# Patient Record
Sex: MALE | Race: WHITE | Employment: FULL TIME
[De-identification: names, ages, dates, MRNs, and addresses within clinical notes are randomized per-mention and may not be internally consistent; named-entity substitution may affect disease eponyms.]

---

## 2017-01-10 ENCOUNTER — OFFICE VISIT (OUTPATIENT)
Facility: CLINIC | Age: 57
End: 2017-01-10

## 2017-01-10 VITALS
WEIGHT: 209 LBS | DIASTOLIC BLOOD PRESSURE: 70 MMHG | SYSTOLIC BLOOD PRESSURE: 124 MMHG | BODY MASS INDEX: 31.67 KG/M2 | HEIGHT: 68 IN | TEMPERATURE: 98.6 F

## 2017-01-10 DIAGNOSIS — M54.41 CHRONIC BILATERAL LOW BACK PAIN WITH BILATERAL SCIATICA: Primary | ICD-10-CM

## 2017-01-10 DIAGNOSIS — R73.9 ELEVATED BLOOD SUGAR: ICD-10-CM

## 2017-01-10 DIAGNOSIS — G89.29 CHRONIC BILATERAL LOW BACK PAIN WITH BILATERAL SCIATICA: Primary | ICD-10-CM

## 2017-01-10 DIAGNOSIS — Z00.00 ROUTINE HEALTH MAINTENANCE: ICD-10-CM

## 2017-01-10 DIAGNOSIS — L98.9 SKIN LESION: ICD-10-CM

## 2017-01-10 DIAGNOSIS — Z12.5 PROSTATE CANCER SCREENING: ICD-10-CM

## 2017-01-10 DIAGNOSIS — M54.42 CHRONIC BILATERAL LOW BACK PAIN WITH BILATERAL SCIATICA: Primary | ICD-10-CM

## 2017-01-10 PROCEDURE — 99214 OFFICE O/P EST MOD 30 MIN: CPT | Performed by: NURSE PRACTITIONER

## 2017-01-10 RX ORDER — HYDROCODONE BITARTRATE AND ACETAMINOPHEN 5; 325 MG/1; MG/1
1 TABLET ORAL EVERY 4 HOURS PRN
Qty: 30 TABLET | Refills: 0 | Status: SHIPPED | OUTPATIENT
Start: 2017-01-10 | End: 2017-03-13 | Stop reason: SDUPTHER

## 2017-01-10 RX ORDER — PREGABALIN 150 MG/1
150 CAPSULE ORAL 2 TIMES DAILY
Qty: 60 CAPSULE | Refills: 3 | Status: SHIPPED | OUTPATIENT
Start: 2017-01-10 | End: 2017-08-14 | Stop reason: SDUPTHER

## 2017-01-10 ASSESSMENT — ENCOUNTER SYMPTOMS: BACK PAIN: 1

## 2017-02-03 ENCOUNTER — OFFICE VISIT (OUTPATIENT)
Facility: CLINIC | Age: 57
End: 2017-02-03

## 2017-02-03 VITALS
SYSTOLIC BLOOD PRESSURE: 128 MMHG | WEIGHT: 210 LBS | BODY MASS INDEX: 31.83 KG/M2 | DIASTOLIC BLOOD PRESSURE: 78 MMHG | TEMPERATURE: 98.1 F | HEIGHT: 68 IN

## 2017-02-03 DIAGNOSIS — R20.9 DISTURBANCE OF SKIN SENSATION: ICD-10-CM

## 2017-02-03 DIAGNOSIS — D22.9 CHANGE IN MOLE: Primary | ICD-10-CM

## 2017-02-03 PROCEDURE — 11421 EXC H-F-NK-SP B9+MARG 0.6-1: CPT | Performed by: NURSE PRACTITIONER

## 2017-02-03 PROCEDURE — 11420 EXC H-F-NK-SP B9+MARG 0.5/<: CPT | Performed by: NURSE PRACTITIONER

## 2017-02-03 RX ORDER — CEPHALEXIN 500 MG/1
500 CAPSULE ORAL 4 TIMES DAILY
Qty: 40 CAPSULE | Refills: 0 | Status: SHIPPED | OUTPATIENT
Start: 2017-02-03 | End: 2017-02-13

## 2017-02-03 RX ORDER — LIDOCAINE HYDROCHLORIDE AND EPINEPHRINE 10; 10 MG/ML; UG/ML
20 INJECTION, SOLUTION INFILTRATION; PERINEURAL ONCE
Status: COMPLETED | OUTPATIENT
Start: 2017-02-03 | End: 2017-02-03

## 2017-02-03 RX ADMIN — LIDOCAINE HYDROCHLORIDE AND EPINEPHRINE 20 ML: 10; 10 INJECTION, SOLUTION INFILTRATION; PERINEURAL at 15:10

## 2017-02-20 RX ORDER — ONDANSETRON 4 MG/1
4 TABLET, FILM COATED ORAL EVERY 8 HOURS PRN
Qty: 60 TABLET | Refills: 1 | Status: SHIPPED | OUTPATIENT
Start: 2017-02-20 | End: 2018-11-15 | Stop reason: SDUPTHER

## 2018-08-10 ENCOUNTER — APPOINTMENT (OUTPATIENT)
Dept: GENERAL RADIOLOGY | Age: 58
End: 2018-08-10
Payer: COMMERCIAL

## 2018-08-10 ENCOUNTER — HOSPITAL ENCOUNTER (EMERGENCY)
Age: 58
Discharge: HOME | End: 2018-08-10
Attending: EMERGENCY MEDICINE
Payer: COMMERCIAL

## 2018-08-10 VITALS
RESPIRATION RATE: 16 BRPM | OXYGEN SATURATION: 97 % | TEMPERATURE: 98.2 F | BODY MASS INDEX: 30.31 KG/M2 | DIASTOLIC BLOOD PRESSURE: 87 MMHG | HEIGHT: 68 IN | HEART RATE: 78 BPM | SYSTOLIC BLOOD PRESSURE: 145 MMHG | WEIGHT: 200 LBS

## 2018-08-10 DIAGNOSIS — N20.0 KIDNEY STONE: Primary | ICD-10-CM

## 2018-08-10 LAB
-: NORMAL
AMORPHOUS: NORMAL
BACTERIA: NORMAL
BILIRUBIN URINE: NEGATIVE
CASTS UA: NORMAL /LPF (ref 0–8)
COLOR: YELLOW
COMMENT UA: ABNORMAL
CRYSTALS, UA: NORMAL /HPF
EPITHELIAL CELLS UA: NORMAL /HPF (ref 0–5)
GLUCOSE URINE: ABNORMAL
KETONES, URINE: NEGATIVE
LEUKOCYTE ESTERASE, URINE: NEGATIVE
MUCUS: NORMAL
NITRITE, URINE: NEGATIVE
OTHER OBSERVATIONS UA: NORMAL
PH UA: 5 (ref 5–8)
PROTEIN UA: NEGATIVE
RBC UA: NORMAL /HPF (ref 0–4)
RENAL EPITHELIAL, UA: NORMAL /HPF
SPECIFIC GRAVITY UA: 1.02 (ref 1–1.03)
TRICHOMONAS: NORMAL
TURBIDITY: CLEAR
URINE HGB: ABNORMAL
UROBILINOGEN, URINE: NORMAL
WBC UA: NORMAL /HPF (ref 0–5)
YEAST: NORMAL

## 2018-08-10 PROCEDURE — 99284 EMERGENCY DEPT VISIT MOD MDM: CPT

## 2018-08-10 PROCEDURE — 6360000002 HC RX W HCPCS: Performed by: STUDENT IN AN ORGANIZED HEALTH CARE EDUCATION/TRAINING PROGRAM

## 2018-08-10 PROCEDURE — 96372 THER/PROPH/DIAG INJ SC/IM: CPT

## 2018-08-10 PROCEDURE — 81001 URINALYSIS AUTO W/SCOPE: CPT

## 2018-08-10 PROCEDURE — 74018 RADEX ABDOMEN 1 VIEW: CPT

## 2018-08-10 RX ORDER — TAMSULOSIN HYDROCHLORIDE 0.4 MG/1
0.4 CAPSULE ORAL DAILY
Qty: 5 CAPSULE | Refills: 0 | Status: SHIPPED | OUTPATIENT
Start: 2018-08-10 | End: 2019-01-14 | Stop reason: ALTCHOICE

## 2018-08-10 RX ORDER — KETOROLAC TROMETHAMINE 30 MG/ML
30 INJECTION, SOLUTION INTRAMUSCULAR; INTRAVENOUS ONCE
Status: COMPLETED | OUTPATIENT
Start: 2018-08-10 | End: 2018-08-10

## 2018-08-10 RX ORDER — KETOROLAC TROMETHAMINE 10 MG/1
10 TABLET, FILM COATED ORAL EVERY 6 HOURS PRN
Qty: 20 TABLET | Refills: 0 | Status: SHIPPED | OUTPATIENT
Start: 2018-08-10 | End: 2018-08-20 | Stop reason: ALTCHOICE

## 2018-08-10 RX ADMIN — KETOROLAC TROMETHAMINE 30 MG: 30 INJECTION, SOLUTION INTRAMUSCULAR at 08:26

## 2018-08-10 ASSESSMENT — ENCOUNTER SYMPTOMS
CHEST TIGHTNESS: 0
COUGH: 0
SHORTNESS OF BREATH: 0
WHEEZING: 0
SORE THROAT: 0
VOMITING: 0
BACK PAIN: 0
TROUBLE SWALLOWING: 0
ABDOMINAL PAIN: 0
NAUSEA: 0
PHOTOPHOBIA: 0

## 2018-08-10 ASSESSMENT — PAIN SCALES - GENERAL: PAINLEVEL_OUTOF10: 0

## 2018-08-10 NOTE — ED PROVIDER NOTES
[facility] ED  Emergency Department Encounter  Emergency Medicine Resident     Pt Name: Maxwell Spence  MRN: [id number]  [place] 1960  Date of evaluation: 8/10/18  PCP:  Willa Gleason MD    [address] [place] [address]       Chief Complaint   Patient presents with    Abdominal Pain       HISTORY OF PRESENT ILLNESS  (Location/Symptom, Timing/Onset, Context/Setting, Quality, Duration, Modifying Factors, Severity.)      Maxwell Spence is a 66 y.o. male who presents with Right lower quadrant abdominal pain that radiates down his right groin and right testicle. Patient without any fevers, nausea, vomiting. Patient states that his pain started briefly this morning while at work. Patient denies any heavy lifting or injury to the area. Patient denies any prior history of hernias. He denies any bulging to the area. Pain lasted approximately 5-10 minutes. Patient with history of kidney stone and states that this feels like a kidney stone. He denies any hematuria, dysuria. PAST MEDICAL / SURGICAL / SOCIAL / FAMILY HISTORY      has a past medical history of Chronic back pain; Headache(784.0); Kidney stones; Osteoarthritis; and Type II or unspecified type diabetes mellitus without mention of complication, not stated as uncontrolled. has a past surgical history that includes Colonoscopy (2012); Spine surgery; and Ureter stent placement. Social History     Social History    Marital status:      Spouse name: N/A    Number of children: N/A    Years of education: N/A     Occupational History    Not on file.      Social History Main Topics    Smoking status: Never Smoker    Smokeless tobacco: Never Used    Alcohol use Yes      Comment: rarly    Drug use: No    Sexual activity: Not on file     Other Topics Concern    Not on file     Social History Narrative    No narrative on file       Family History   Problem Relation Age of Onset    Diabetes Mother     High Blood Pressure Mother    [employer] confusion. PHYSICAL EXAM   (up to 7 for level 4, 8 or more for level 5)      INITIAL VITALS:   BP (!) 145/87   Pulse 78   Temp 98.2 °F (36.8 °C) (Oral)   Resp 16   Ht 5' 8\" (1.727 m)   Wt 200 lb (90.7 kg)   SpO2 97%   BMI 30.41 kg/m²     Physical Exam   Constitutional: He is oriented to person, place, and time. He appears well-developed and well-nourished. HENT:   Head: Normocephalic and atraumatic. Nose: Nose normal.   Mouth/Throat: Oropharynx is clear and moist.   Eyes: EOM are normal. Pupils are equal, round, and reactive to light. Neck: Normal range of motion. Neck supple. Cardiovascular: Normal rate, regular rhythm, normal heart sounds and intact distal pulses. Pulmonary/Chest: Breath sounds normal. No respiratory distress. He has no wheezes. He has no rales. Abdominal: Soft. Bowel sounds are normal. He exhibits no distension. There is no tenderness. Abdomen soft, no guarding, no rebound, no tenderness right lower quadrant. Genitourinary:   Genitourinary Comments: No testicular swelling, tenderness. No overlying scrotal edema. Musculoskeletal: Normal range of motion. He exhibits no edema or deformity. Neurological: He is alert and oriented to person, place, and time. He has normal reflexes. Skin: Skin is warm and dry. No rash noted. No erythema. Psychiatric: He has a normal mood and affect.  His behavior is normal.       DIFFERENTIAL  DIAGNOSIS     PLAN (LABS / IMAGING / EKG):  Orders Placed This Encounter   Procedures    XR ABDOMEN (KUB) (SINGLE AP VIEW)    Urinalysis Reflex to Culture    Microscopic Urinalysis       MEDICATIONS ORDERED:  Orders Placed This Encounter   Medications    ketorolac (TORADOL) injection 30 mg    ketorolac (TORADOL) 10 MG tablet     Sig: Take 1 tablet by mouth every 6 hours as needed for Pain     Dispense:  20 tablet     Refill:  0    tamsulosin (FLOMAX) 0.4 MG capsule     Sig: Take 1 capsule by mouth daily for 5 doses     Dispense:  5 or persist or if he experiences decreased urination or hematuria. And so UTI on urinalysis. Large blood is present.     PROCEDURES:  None    CONSULTS:  None    CRITICAL CARE:  None    FINAL IMPRESSION      1. Kidney stone          DISPOSITION / PLAN     DISPOSITION Decision To Discharge 08/10/2018 09:24:11 AM      PATIENT REFERRED TO:  Sariah Mason MD  [address] [facility]  [address] [facility]  [address] [facility]  [phone]    Schedule an appointment as soon as possible for a visit in 1 week  As needed    Melissa Santoyo MD  [place] [address], [address]  [address]    Schedule an appointment as soon as possible for a visit in 1 week  As needed      DISCHARGE MEDICATIONS:  Discharge Medication List as of 8/10/2018  9:26 AM      START taking these medications    Details   ketorolac (TORADOL) 10 MG tablet Take 1 tablet by mouth every 6 hours as needed for Pain, Disp-20 tablet, R-0Print      tamsulosin (FLOMAX) 0.4 MG capsule Take 1 capsule by mouth daily for 5 doses, Disp-5 capsule, R-0Print             Mark Rosales MD  Emergency Medicine Resident    (Please note that portions of this note were completed with a voice recognition program.  Efforts were made to edit the dictations but occasionally words are mis-transcribed.)       Mark Rosales MD  08/10/18 [id number]

## 2018-08-20 ENCOUNTER — OFFICE VISIT (OUTPATIENT)
Dept: UROLOGY | Age: 58
End: 2018-08-20
Payer: COMMERCIAL

## 2018-08-20 VITALS
HEIGHT: 68 IN | DIASTOLIC BLOOD PRESSURE: 81 MMHG | BODY MASS INDEX: 30.68 KG/M2 | SYSTOLIC BLOOD PRESSURE: 123 MMHG | WEIGHT: 202.4 LBS | TEMPERATURE: 98.6 F | HEART RATE: 58 BPM

## 2018-08-20 DIAGNOSIS — R10.9 FLANK PAIN: ICD-10-CM

## 2018-08-20 DIAGNOSIS — N20.0 KIDNEY STONE: Primary | ICD-10-CM

## 2018-08-20 DIAGNOSIS — N20.1 URETERAL STONE: ICD-10-CM

## 2018-08-20 PROCEDURE — 99204 OFFICE O/P NEW MOD 45 MIN: CPT | Performed by: UROLOGY

## 2018-08-20 ASSESSMENT — ENCOUNTER SYMPTOMS
ABDOMINAL PAIN: 0
SHORTNESS OF BREATH: 0
COUGH: 0
NAUSEA: 0
WHEEZING: 0
EYE REDNESS: 0
VOMITING: 0
COLOR CHANGE: 0
BACK PAIN: 0
EYE PAIN: 0

## 2018-08-20 NOTE — PROGRESS NOTES
[facility]  [facility] [place]  [address]  [address]  [address] [facility] [address]  Dept: [phone]  Dept Fax: [phone] [facility] Urology Office Note - New patient    Patient:  Wally Rizzo  YOB: 1960  Date: 8/20/2018    The patient is a 66 y.o. male who presents today for evaluation of the following problems:   Chief Complaint   Patient presents with    New Patient     Kidney stones . seen at [facility]     referred by Lilly Saravia MD.      HPI  Pt has h/o stones. Had flank pain on right last week and went to Ed. Bedside us showed right hydro and pt found to have stones on KUB. Pain has resolved. No LUTS. (Patient's old records have been requested, reviewed and summarized in today's note.)    Summary of old records: N/A    Additional History: N/A    Procedures Today: N/A    Last several PSA's:  Lab Results   Component Value Date    PSA 0.42 05/02/2015     Last total testosterone:  No results found for: TESTOSTERONE  Urinalysis today:  No results found for this visit on 08/20/18. AUA Symptom Score (8/20/2018):   INCOMPLETE EMPTYING: How often have you had the sensation of not emptying your bladder?: Not at all  FREQUENCY: How often do you have to urinate less than every two hours?: Not at all  INTERMITTENCY: How often have you found you stopped and started again several times when you urinated?: Not at all  URGENCY: How often have you found it difficult to postpone urination?: Not at all  WEAK STREAM: How often have you had a weak urinary stream?: Not at all  STRAINING: How often have you had to strain to start  urination?: Not at all  NOCTURIA: How many times did you typically get up at night to uriniate?: NONE  TOTAL I-PSS SCORE[de-identified] 0  How would you feel if you were to spend the rest of your life with your urinary condition?: Pleased    Last BUN and creatinine:  Lab Results   Component Value Date    BUN 17 05/02/2015     Lab Results   Component Value Date    CREATININE 0.96 05/02/2015       Additional Lab/Culture results: none    Imaging Reviewed during this Office Visit: KUB bilateral stones  (results were independently reviewed by physician and radiology report verified)    PAST MEDICAL, FAMILY AND SOCIAL HISTORY:  Past Medical History:   Diagnosis Date    Chronic back pain     Headache(784.0)     Kidney stones     Osteoarthritis     Type II or unspecified type diabetes mellitus without mention of complication, not stated as uncontrolled     diet controlled     Past Surgical History:   Procedure Laterality Date    COLONOSCOPY  2012    SPINE SURGERY      laminectomy X2 and Spinal Cord stimulartor implant    URETER STENT PLACEMENT       Family History   Problem Relation Age of Onset    Diabetes Mother     High Blood Pressure Mother     Diabetes Father     Glaucoma Father     Eczema Father      Outpatient Prescriptions Marked as Taking for the 8/20/18 encounter (Office Visit) with Brock Boggs MD   Medication Sig Dispense Refill    HYDROcodone-acetaminophen (NORCO) 5-325 MG per tablet Take 1 tablet by mouth every 4 hours as needed for Pain for up to 30 days. . 30 tablet 0    LYRICA 150 MG capsule TAKE ONE CAPSULE BY MOUTH TWICE A DAY 60 capsule 2    ibuprofen (ADVIL;MOTRIN) 800 MG tablet Take 1 tablet by mouth every 6 hours as needed for Pain 240 tablet 1    ondansetron (ZOFRAN) 4 MG tablet Take 1 tablet by mouth every 8 hours as needed for Nausea 60 tablet 1       Morphine and Fentanyl  History   Smoking Status    Never Smoker   Smokeless Tobacco    Never Used      (If patient a smoker, smoking cessation counseling offered)   History   Alcohol Use    Yes     Comment: rarly       REVIEW OF SYSTEMS:  Review of Systems    Physical Exam:    This a 66 y.o. male   Vitals:    08/20/18 0919   BP: 123/81   Pulse: 58   Temp: 98.6 °F (37 °C)     Body mass index is 30.77 kg/m². Physical Exam  Constitutional: Patient in no acute distress.   Neuro: Alert and oriented to person, place and time. Psych: Mood normal, affect normal  Skin: No rash noted  HEENT: Head: Normocephalic and atraumatic  Conjunctivae and EOM are normal. Pupils are equal, round  Nose: Normal  Right External Ear: Normal; Left External Ear: Normal  Mouth: Mucosa Moist  Neck: Supple  Lungs: Respiratory effort is normal  Cardiovascular: Warm & Pink  Abdomen: Soft, non-tender, non-distended with no CVA,  No flank tenderness,  Or hepatosplenomegaly   Lymphatics: No palpable lymphadenopathy. Bladder non-tender and not distended. Musculoskeletal: Normal gait and station  Penis normal and circumcised  Urethral meatus normal  Scrotal exam normal  Testicles normal bilaterally  Epididymis normal bilaterally        Assessment and Plan      1. Kidney stone    2. Ureteral stone    3. Flank pain           Plan:   Ct a/p stone protocol  F/u after above       Prescriptions Ordered:  No orders of the defined types were placed in this encounter.      Orders Placed:  Orders Placed This Encounter   Procedures    CT ABDOMEN PELVIS WO CONTRAST Additional Contrast? None     Standing Status:   Future     Standing Expiration Date:   8/20/2019     Order Specific Question:   Additional Contrast?     Answer:   None     Order Specific Question:   Reason for exam:     Answer:   kidney stones            Chelsea Burger MD

## 2018-08-25 ENCOUNTER — HOSPITAL ENCOUNTER (OUTPATIENT)
Dept: CT IMAGING | Age: 58
Discharge: HOME | End: 2018-08-27
Payer: COMMERCIAL

## 2018-08-25 DIAGNOSIS — N20.0 KIDNEY STONE: ICD-10-CM

## 2018-08-25 DIAGNOSIS — R10.9 FLANK PAIN: ICD-10-CM

## 2018-08-25 DIAGNOSIS — N20.1 URETERAL STONE: ICD-10-CM

## 2018-08-25 PROCEDURE — 74176 CT ABD & PELVIS W/O CONTRAST: CPT

## 2018-09-10 ENCOUNTER — TELEPHONE (OUTPATIENT)
Dept: UROLOGY | Age: 58
End: 2018-09-10

## 2018-09-10 ENCOUNTER — OFFICE VISIT (OUTPATIENT)
Dept: UROLOGY | Age: 58
End: 2018-09-10
Payer: COMMERCIAL

## 2018-09-10 VITALS
HEIGHT: 68 IN | WEIGHT: 203.8 LBS | HEART RATE: 53 BPM | TEMPERATURE: 98.3 F | BODY MASS INDEX: 30.89 KG/M2 | SYSTOLIC BLOOD PRESSURE: 127 MMHG | DIASTOLIC BLOOD PRESSURE: 81 MMHG

## 2018-09-10 DIAGNOSIS — N20.1 URETERAL STONE: Primary | ICD-10-CM

## 2018-09-10 DIAGNOSIS — N20.0 KIDNEY STONE: ICD-10-CM

## 2018-09-10 DIAGNOSIS — Z01.818 PRE-OP TESTING: Primary | ICD-10-CM

## 2018-09-10 DIAGNOSIS — R10.9 FLANK PAIN: ICD-10-CM

## 2018-09-10 PROCEDURE — 99999 EKG 12-LEAD: CPT | Performed by: UROLOGY

## 2018-09-10 PROCEDURE — 99214 OFFICE O/P EST MOD 30 MIN: CPT | Performed by: UROLOGY

## 2018-09-10 ASSESSMENT — ENCOUNTER SYMPTOMS
WHEEZING: 0
COLOR CHANGE: 0
EYE PAIN: 0
SHORTNESS OF BREATH: 0
EYE REDNESS: 0
BACK PAIN: 0
VOMITING: 0
NAUSEA: 0
COUGH: 0
ABDOMINAL PAIN: 0

## 2018-09-10 NOTE — TELEPHONE ENCOUNTER
(LT) FELIX @ [facility] 10/17/18 pm **STOP BLOOD THINNERS 10/10/18**  Please put in order for KUB and EKG           Spoke with in office, procedure info give to patient.

## 2018-09-10 NOTE — PROGRESS NOTES
[facility]  [facility] [place]  [address]  [address]  [address] [facility] [address]  Dept: [address] [facility] Urology Office Note - Established    Patient:  Wally Rizzo  YOB: 1960  Date: 9/10/2018    The patient is a 66 y.o. male who presents today for evaluation of the following problems:   Chief Complaint   Patient presents with    Nephrolithiasis     CT Results        HPI  Kidney calculus:  Patient is here today for a kidney calculus which was first noted a few week(s) ago. Location: Right upper and lower pole  Size: multiple mm  Current medical Rx for renal calculus: none  Passed recent calculus? Yes  Stone composition: unknown  Flank pain? no  Hematuria? none    Lab Results   Component Value Date    CALCIUM 8.7 05/02/2015     Lab Results   Component Value Date     05/02/2015    K 4.7 05/02/2015     05/02/2015    CO2 25 05/02/2015         Summary of old records: N/A    Additional History: N/A    Procedures Today: N/A    Urinalysis today:  No results found for this visit on 09/10/18. Last several PSA's:  Lab Results   Component Value Date    PSA 0.42 05/02/2015     Last total testosterone:  No results found for: TESTOSTERONE    AUA Symptom Score (9/10/2018):   INCOMPLETE EMPTYING: How often have you had the sensation of not emptying your bladder?: Not at all  FREQUENCY: How often do you have to urinate less than every two hours?: Not at all  INTERMITTENCY: How often have you found you stopped and started again several times when you urinated?: Not at all  URGENCY: How often have you found it difficult to postpone urination?: Not at all  WEAK STREAM: How often have you had a weak urinary stream?: Not at all  STRAINING: How often have you had to strain to start  urination?: Not at all  NOCTURIA: How many times did you typically get up at night to uriniate?: NONE  TOTAL I-PSS SCORE[de-identified] 0  How would you feel if you were to spend the rest of your life with your distress. Neuro: Alert and oriented to person, place and time. Psych: Mood normal, affect normal  Skin: No rash noted  HEENT: Head: Normocephalic and atraumatic  Conjunctivae and EOM are normal. Pupils are equal, round  Nose: Normal  Right External Ear: Normal; Left External Ear: Normal  Mouth: Mucosa Moist  Neck: Supple  Lungs: Respiratory effort is normal  Cardiovascular: Warm & Pink  Abdomen: Soft, non-tender, non-distended with no CVA,  No flank tenderness,  Or hepatosplenomegaly   Lymphatics: No palpable lymphadenopathy. Bladder non-tender and not distended. Musculoskeletal: Normal gait and station      Assessment and Plan      1. Ureteral stone    2. Kidney stone    3. Flank pain           Plan:   Left eswl       Prescriptions Ordered:  No orders of the defined types were placed in this encounter. Orders Placed:  No orders of the defined types were placed in this encounter. Jeannie Marquez MD    Agree with the ROS entered by the MA.

## 2018-10-10 ENCOUNTER — HOSPITAL ENCOUNTER (OUTPATIENT)
Dept: GENERAL RADIOLOGY | Age: 58
Discharge: HOME | End: 2018-10-12
Payer: COMMERCIAL

## 2018-10-10 ENCOUNTER — HOSPITAL ENCOUNTER (OUTPATIENT)
Age: 58
Discharge: HOME | End: 2018-10-12
Payer: COMMERCIAL

## 2018-10-10 ENCOUNTER — HOSPITAL ENCOUNTER (OUTPATIENT)
Age: 58
Discharge: HOME | End: 2018-10-10
Payer: COMMERCIAL

## 2018-10-10 DIAGNOSIS — Z01.818 PRE-OP TESTING: ICD-10-CM

## 2018-10-10 LAB
EKG ATRIAL RATE: 52 BPM
EKG P AXIS: 69 DEGREES
EKG P-R INTERVAL: 158 MS
EKG Q-T INTERVAL: 436 MS
EKG QRS DURATION: 98 MS
EKG QTC CALCULATION (BAZETT): 405 MS
EKG R AXIS: 33 DEGREES
EKG T AXIS: 37 DEGREES
EKG VENTRICULAR RATE: 52 BPM

## 2018-10-10 PROCEDURE — 74018 RADEX ABDOMEN 1 VIEW: CPT

## 2018-10-10 PROCEDURE — 93005 ELECTROCARDIOGRAM TRACING: CPT

## 2018-11-06 ENCOUNTER — TELEPHONE (OUTPATIENT)
Dept: UROLOGY | Age: 58
End: 2018-11-06

## 2018-11-06 DIAGNOSIS — N20.0 KIDNEY STONE: Primary | ICD-10-CM

## 2019-01-12 ENCOUNTER — NURSE TRIAGE (OUTPATIENT)
Dept: OTHER | Facility: CLINIC | Age: 59
End: 2019-01-12

## 2019-01-12 ENCOUNTER — HOSPITAL ENCOUNTER (EMERGENCY)
Age: 59
Discharge: HOME | End: 2019-01-12
Attending: EMERGENCY MEDICINE
Payer: COMMERCIAL

## 2019-01-12 ENCOUNTER — APPOINTMENT (OUTPATIENT)
Dept: CT IMAGING | Age: 59
End: 2019-01-12
Payer: COMMERCIAL

## 2019-01-12 VITALS
HEART RATE: 58 BPM | DIASTOLIC BLOOD PRESSURE: 83 MMHG | BODY MASS INDEX: 30.31 KG/M2 | OXYGEN SATURATION: 98 % | SYSTOLIC BLOOD PRESSURE: 159 MMHG | HEIGHT: 68 IN | WEIGHT: 200 LBS | TEMPERATURE: 98 F | RESPIRATION RATE: 16 BRPM

## 2019-01-12 DIAGNOSIS — N20.1 URETEROLITHIASIS: Primary | ICD-10-CM

## 2019-01-12 LAB
-: ABNORMAL
ABSOLUTE EOS #: 0.1 K/UL (ref 0–0.4)
ABSOLUTE IMMATURE GRANULOCYTE: ABNORMAL K/UL (ref 0–0.3)
ABSOLUTE LYMPH #: 1.5 K/UL (ref 1–4.8)
ABSOLUTE MONO #: 0.8 K/UL (ref 0.1–1.3)
ALBUMIN SERPL-MCNC: 4.1 G/DL (ref 3.5–5.2)
ALBUMIN/GLOBULIN RATIO: ABNORMAL (ref 1–2.5)
ALP BLD-CCNC: 64 U/L (ref 40–129)
ALT SERPL-CCNC: 20 U/L (ref 5–41)
AMORPHOUS: ABNORMAL
ANION GAP SERPL CALCULATED.3IONS-SCNC: 14 MMOL/L (ref 9–17)
AST SERPL-CCNC: 17 U/L
BACTERIA: ABNORMAL
BASOPHILS # BLD: 0 % (ref 0–2)
BASOPHILS ABSOLUTE: 0 K/UL (ref 0–0.2)
BILIRUB SERPL-MCNC: 0.61 MG/DL (ref 0.3–1.2)
BILIRUBIN URINE: NEGATIVE
BUN BLDV-MCNC: 13 MG/DL (ref 6–20)
BUN/CREAT BLD: ABNORMAL (ref 9–20)
CALCIUM SERPL-MCNC: 9.4 MG/DL (ref 8.6–10.4)
CASTS UA: ABNORMAL /LPF
CHLORIDE BLD-SCNC: 101 MMOL/L (ref 98–107)
CO2: 22 MMOL/L (ref 20–31)
COLOR: YELLOW
COMMENT UA: ABNORMAL
CREAT SERPL-MCNC: 0.87 MG/DL (ref 0.7–1.2)
CRYSTALS, UA: ABNORMAL /HPF
DIFFERENTIAL TYPE: ABNORMAL
EOSINOPHILS RELATIVE PERCENT: 1 % (ref 0–4)
EPITHELIAL CELLS UA: ABNORMAL /HPF
GFR AFRICAN AMERICAN: >60 ML/MIN
GFR NON-AFRICAN AMERICAN: >60 ML/MIN
GFR SERPL CREATININE-BSD FRML MDRD: ABNORMAL ML/MIN/{1.73_M2}
GFR SERPL CREATININE-BSD FRML MDRD: ABNORMAL ML/MIN/{1.73_M2}
GLUCOSE BLD-MCNC: 169 MG/DL (ref 70–99)
GLUCOSE URINE: NEGATIVE
HCT VFR BLD CALC: 48.6 % (ref 41–53)
HEMOGLOBIN: 16.3 G/DL (ref 13.5–17.5)
IMMATURE GRANULOCYTES: ABNORMAL %
KETONES, URINE: ABNORMAL
LEUKOCYTE ESTERASE, URINE: NEGATIVE
LIPASE: 45 U/L (ref 13–60)
LYMPHOCYTES # BLD: 17 % (ref 24–44)
MCH RBC QN AUTO: 31.3 PG (ref 26–34)
MCHC RBC AUTO-ENTMCNC: 33.5 G/DL (ref 31–37)
MCV RBC AUTO: 93.4 FL (ref 80–100)
MONOCYTES # BLD: 9 % (ref 1–7)
MUCUS: ABNORMAL
NITRITE, URINE: NEGATIVE
NRBC AUTOMATED: ABNORMAL PER 100 WBC
OTHER OBSERVATIONS UA: ABNORMAL
PDW BLD-RTO: 12.7 % (ref 11.5–14.9)
PH UA: 5.5 (ref 5–8)
PLATELET # BLD: 240 K/UL (ref 150–450)
PLATELET ESTIMATE: ABNORMAL
PMV BLD AUTO: 7.9 FL (ref 6–12)
POTASSIUM SERPL-SCNC: 4.2 MMOL/L (ref 3.7–5.3)
PROTEIN UA: NEGATIVE
RBC # BLD: 5.2 M/UL (ref 4.5–5.9)
RBC # BLD: ABNORMAL 10*6/UL
RBC UA: ABNORMAL /HPF
RENAL EPITHELIAL, UA: ABNORMAL /HPF
SEG NEUTROPHILS: 73 % (ref 36–66)
SEGMENTED NEUTROPHILS ABSOLUTE COUNT: 6.3 K/UL (ref 1.3–9.1)
SODIUM BLD-SCNC: 137 MMOL/L (ref 135–144)
SPECIFIC GRAVITY UA: 1.01 (ref 1–1.03)
TOTAL PROTEIN: 7.7 G/DL (ref 6.4–8.3)
TRICHOMONAS: ABNORMAL
TURBIDITY: CLEAR
URINE HGB: ABNORMAL
UROBILINOGEN, URINE: NORMAL
WBC # BLD: 8.7 K/UL (ref 3.5–11)
WBC # BLD: ABNORMAL 10*3/UL
WBC UA: ABNORMAL /HPF
YEAST: ABNORMAL

## 2019-01-12 PROCEDURE — 6360000002 HC RX W HCPCS: Performed by: EMERGENCY MEDICINE

## 2019-01-12 PROCEDURE — 80053 COMPREHEN METABOLIC PANEL: CPT

## 2019-01-12 PROCEDURE — 96374 THER/PROPH/DIAG INJ IV PUSH: CPT

## 2019-01-12 PROCEDURE — 36415 COLL VENOUS BLD VENIPUNCTURE: CPT

## 2019-01-12 PROCEDURE — 85025 COMPLETE CBC W/AUTO DIFF WBC: CPT

## 2019-01-12 PROCEDURE — 99284 EMERGENCY DEPT VISIT MOD MDM: CPT

## 2019-01-12 PROCEDURE — 81001 URINALYSIS AUTO W/SCOPE: CPT

## 2019-01-12 PROCEDURE — 96375 TX/PRO/DX INJ NEW DRUG ADDON: CPT

## 2019-01-12 PROCEDURE — 83690 ASSAY OF LIPASE: CPT

## 2019-01-12 PROCEDURE — 2580000003 HC RX 258: Performed by: EMERGENCY MEDICINE

## 2019-01-12 PROCEDURE — 74176 CT ABD & PELVIS W/O CONTRAST: CPT

## 2019-01-12 RX ORDER — ONDANSETRON 2 MG/ML
4 INJECTION INTRAMUSCULAR; INTRAVENOUS ONCE
Status: COMPLETED | OUTPATIENT
Start: 2019-01-12 | End: 2019-01-12

## 2019-01-12 RX ORDER — HYDROCODONE BITARTRATE AND ACETAMINOPHEN 5; 325 MG/1; MG/1
1 TABLET ORAL EVERY 6 HOURS PRN
Qty: 10 TABLET | Refills: 0 | Status: SHIPPED | OUTPATIENT
Start: 2019-01-12 | End: 2019-01-14

## 2019-01-12 RX ORDER — KETOROLAC TROMETHAMINE 30 MG/ML
30 INJECTION, SOLUTION INTRAMUSCULAR; INTRAVENOUS ONCE
Status: COMPLETED | OUTPATIENT
Start: 2019-01-12 | End: 2019-01-12

## 2019-01-12 RX ORDER — 0.9 % SODIUM CHLORIDE 0.9 %
1000 INTRAVENOUS SOLUTION INTRAVENOUS ONCE
Status: COMPLETED | OUTPATIENT
Start: 2019-01-12 | End: 2019-01-12

## 2019-01-12 RX ORDER — IBUPROFEN 800 MG/1
800 TABLET ORAL EVERY 8 HOURS PRN
Qty: 30 TABLET | Refills: 0 | Status: SHIPPED | OUTPATIENT
Start: 2019-01-12 | End: 2019-01-14 | Stop reason: ALTCHOICE

## 2019-01-12 RX ORDER — TAMSULOSIN HYDROCHLORIDE 0.4 MG/1
0.4 CAPSULE ORAL DAILY
Qty: 7 CAPSULE | Refills: 0 | Status: SHIPPED | OUTPATIENT
Start: 2019-01-12 | End: 2019-10-18

## 2019-01-12 RX ADMIN — ONDANSETRON 4 MG: 2 INJECTION INTRAMUSCULAR; INTRAVENOUS at 12:38

## 2019-01-12 RX ADMIN — SODIUM CHLORIDE 1000 ML: 9 INJECTION, SOLUTION INTRAVENOUS at 12:40

## 2019-01-12 RX ADMIN — KETOROLAC TROMETHAMINE 30 MG: 30 INJECTION, SOLUTION INTRAMUSCULAR; INTRAVENOUS at 12:39

## 2019-01-12 ASSESSMENT — ENCOUNTER SYMPTOMS
SHORTNESS OF BREATH: 0
BACK PAIN: 0
SORE THROAT: 0
COLOR CHANGE: 0
COUGH: 0
TROUBLE SWALLOWING: 0
VOMITING: 0
NAUSEA: 0
DIARRHEA: 0
CONSTIPATION: 0
ABDOMINAL PAIN: 0
BLOOD IN STOOL: 0

## 2019-01-12 ASSESSMENT — PAIN SCALES - GENERAL
PAINLEVEL_OUTOF10: 8
PAINLEVEL_OUTOF10: 8
PAINLEVEL_OUTOF10: 2

## 2019-01-12 ASSESSMENT — PAIN DESCRIPTION - LOCATION: LOCATION: ABDOMEN

## 2019-01-12 ASSESSMENT — PAIN DESCRIPTION - ONSET: ONSET: SUDDEN

## 2019-01-12 ASSESSMENT — PAIN DESCRIPTION - DESCRIPTORS: DESCRIPTORS: CONSTANT

## 2019-01-12 ASSESSMENT — PAIN DESCRIPTION - ORIENTATION: ORIENTATION: RIGHT

## 2019-01-12 ASSESSMENT — PAIN DESCRIPTION - PAIN TYPE: TYPE: ACUTE PAIN

## 2019-01-14 ENCOUNTER — OFFICE VISIT (OUTPATIENT)
Dept: UROLOGY | Age: 59
End: 2019-01-14
Payer: COMMERCIAL

## 2019-01-14 VITALS — DIASTOLIC BLOOD PRESSURE: 83 MMHG | HEART RATE: 74 BPM | TEMPERATURE: 98 F | SYSTOLIC BLOOD PRESSURE: 139 MMHG

## 2019-01-14 DIAGNOSIS — N20.0 KIDNEY STONE: Primary | ICD-10-CM

## 2019-01-14 DIAGNOSIS — R10.9 FLANK PAIN: ICD-10-CM

## 2019-01-14 DIAGNOSIS — N20.1 URETERAL STONE: ICD-10-CM

## 2019-01-14 PROCEDURE — 1036F TOBACCO NON-USER: CPT | Performed by: UROLOGY

## 2019-01-14 PROCEDURE — G8484 FLU IMMUNIZE NO ADMIN: HCPCS | Performed by: UROLOGY

## 2019-01-14 PROCEDURE — 99214 OFFICE O/P EST MOD 30 MIN: CPT | Performed by: UROLOGY

## 2019-01-14 PROCEDURE — 3017F COLORECTAL CA SCREEN DOC REV: CPT | Performed by: UROLOGY

## 2019-01-14 PROCEDURE — G8417 CALC BMI ABV UP PARAM F/U: HCPCS | Performed by: UROLOGY

## 2019-01-14 PROCEDURE — G8427 DOCREV CUR MEDS BY ELIG CLIN: HCPCS | Performed by: UROLOGY

## 2019-01-14 ASSESSMENT — ENCOUNTER SYMPTOMS
BACK PAIN: 0
VOMITING: 0
NAUSEA: 0
EYE PAIN: 0
COUGH: 0
SHORTNESS OF BREATH: 0
COLOR CHANGE: 0
EYE REDNESS: 0
WHEEZING: 0
ABDOMINAL PAIN: 0

## 2019-02-16 ENCOUNTER — HOSPITAL ENCOUNTER (OUTPATIENT)
Age: 59
Discharge: HOME | End: 2019-02-16
Payer: COMMERCIAL

## 2019-02-16 DIAGNOSIS — N20.0 KIDNEY STONE: ICD-10-CM

## 2019-02-16 LAB
ANION GAP SERPL CALCULATED.3IONS-SCNC: 11 MMOL/L (ref 9–17)
BUN BLDV-MCNC: 11 MG/DL (ref 6–20)
BUN/CREAT BLD: ABNORMAL (ref 9–20)
CALCIUM SERPL-MCNC: 8.8 MG/DL (ref 8.6–10.4)
CHLORIDE BLD-SCNC: 104 MMOL/L (ref 98–107)
CO2: 23 MMOL/L (ref 20–31)
CREAT SERPL-MCNC: 0.79 MG/DL (ref 0.7–1.2)
GFR AFRICAN AMERICAN: >60 ML/MIN
GFR NON-AFRICAN AMERICAN: >60 ML/MIN
GFR SERPL CREATININE-BSD FRML MDRD: ABNORMAL ML/MIN/{1.73_M2}
GFR SERPL CREATININE-BSD FRML MDRD: ABNORMAL ML/MIN/{1.73_M2}
GLUCOSE BLD-MCNC: 164 MG/DL (ref 70–99)
POTASSIUM SERPL-SCNC: 4.1 MMOL/L (ref 3.7–5.3)
PTH INTACT: 39.56 PG/ML (ref 15–65)
SODIUM BLD-SCNC: 138 MMOL/L (ref 135–144)
URIC ACID: 5.4 MG/DL (ref 3.4–7)

## 2019-02-16 PROCEDURE — 36415 COLL VENOUS BLD VENIPUNCTURE: CPT

## 2019-02-16 PROCEDURE — 84550 ASSAY OF BLOOD/URIC ACID: CPT

## 2019-02-16 PROCEDURE — 83970 ASSAY OF PARATHORMONE: CPT

## 2019-02-16 PROCEDURE — 80048 BASIC METABOLIC PNL TOTAL CA: CPT

## 2019-02-18 ENCOUNTER — HOSPITAL ENCOUNTER (OUTPATIENT)
Age: 59
Setting detail: SPECIMEN
Discharge: HOME | End: 2019-02-18
Payer: COMMERCIAL

## 2019-02-18 PROCEDURE — 82436 ASSAY OF URINE CHLORIDE: CPT

## 2019-02-18 PROCEDURE — 84133 ASSAY OF URINE POTASSIUM: CPT

## 2019-02-18 PROCEDURE — 84560 ASSAY OF URINE/URIC ACID: CPT

## 2019-02-18 PROCEDURE — 84105 ASSAY OF URINE PHOSPHORUS: CPT

## 2019-02-18 PROCEDURE — 84392 ASSAY OF URINE SULFATE: CPT

## 2019-02-18 PROCEDURE — 83945 ASSAY OF OXALATE: CPT

## 2019-02-18 PROCEDURE — 83735 ASSAY OF MAGNESIUM: CPT

## 2019-02-18 PROCEDURE — 84300 ASSAY OF URINE SODIUM: CPT

## 2019-02-18 PROCEDURE — 82340 ASSAY OF CALCIUM IN URINE: CPT

## 2019-02-18 PROCEDURE — 83986 ASSAY PH BODY FLUID NOS: CPT

## 2019-02-18 PROCEDURE — 82507 ASSAY OF CITRATE: CPT

## 2019-02-26 DIAGNOSIS — N20.0 KIDNEY STONE: Primary | ICD-10-CM

## 2019-02-26 LAB
SEND OUT REPORT: NORMAL
TEST NAME: NORMAL

## 2019-03-02 ENCOUNTER — HOSPITAL ENCOUNTER (OUTPATIENT)
Age: 59
Discharge: HOME | End: 2019-03-02
Payer: COMMERCIAL

## 2019-03-02 DIAGNOSIS — Z13.6 SCREENING FOR CARDIOVASCULAR CONDITION: ICD-10-CM

## 2019-03-02 DIAGNOSIS — N20.0 KIDNEY STONE: ICD-10-CM

## 2019-03-02 DIAGNOSIS — R73.9 ELEVATED BLOOD SUGAR: ICD-10-CM

## 2019-03-02 DIAGNOSIS — Z12.5 PROSTATE CANCER SCREENING: ICD-10-CM

## 2019-03-02 LAB
ABSOLUTE EOS #: 0.2 K/UL (ref 0–0.4)
ABSOLUTE IMMATURE GRANULOCYTE: ABNORMAL K/UL (ref 0–0.3)
ABSOLUTE LYMPH #: 1.8 K/UL (ref 1–4.8)
ABSOLUTE MONO #: 0.5 K/UL (ref 0.1–1.3)
ALBUMIN SERPL-MCNC: 3.9 G/DL (ref 3.5–5.2)
ALBUMIN/GLOBULIN RATIO: 1.3 (ref 1–2.5)
ALP BLD-CCNC: 64 U/L (ref 40–129)
ALT SERPL-CCNC: 23 U/L (ref 5–41)
ANION GAP SERPL CALCULATED.3IONS-SCNC: 10 MMOL/L (ref 9–17)
AST SERPL-CCNC: 17 U/L
BASOPHILS # BLD: 1 % (ref 0–2)
BASOPHILS ABSOLUTE: 0 K/UL (ref 0–0.2)
BILIRUB SERPL-MCNC: 0.66 MG/DL (ref 0.3–1.2)
BUN BLDV-MCNC: 14 MG/DL (ref 6–20)
BUN/CREAT BLD: ABNORMAL (ref 9–20)
CALCIUM SERPL-MCNC: 9.1 MG/DL (ref 8.6–10.4)
CHLORIDE BLD-SCNC: 105 MMOL/L (ref 98–107)
CHOLESTEROL/HDL RATIO: 4.5
CHOLESTEROL: 163 MG/DL
CO2: 22 MMOL/L (ref 20–31)
CREAT SERPL-MCNC: 0.76 MG/DL (ref 0.7–1.2)
DIFFERENTIAL TYPE: ABNORMAL
EOSINOPHILS RELATIVE PERCENT: 4 % (ref 0–4)
GFR AFRICAN AMERICAN: >60 ML/MIN
GFR NON-AFRICAN AMERICAN: >60 ML/MIN
GFR SERPL CREATININE-BSD FRML MDRD: ABNORMAL ML/MIN/{1.73_M2}
GFR SERPL CREATININE-BSD FRML MDRD: ABNORMAL ML/MIN/{1.73_M2}
GLUCOSE BLD-MCNC: 131 MG/DL (ref 70–99)
HCT VFR BLD CALC: 46.2 % (ref 41–53)
HDLC SERPL-MCNC: 36 MG/DL
HEMOGLOBIN: 15.5 G/DL (ref 13.5–17.5)
IMMATURE GRANULOCYTES: ABNORMAL %
LDL CHOLESTEROL: 103 MG/DL (ref 0–130)
LYMPHOCYTES # BLD: 33 % (ref 24–44)
MCH RBC QN AUTO: 31.3 PG (ref 26–34)
MCHC RBC AUTO-ENTMCNC: 33.5 G/DL (ref 31–37)
MCV RBC AUTO: 93.5 FL (ref 80–100)
MONOCYTES # BLD: 10 % (ref 1–7)
NRBC AUTOMATED: ABNORMAL PER 100 WBC
PDW BLD-RTO: 12.8 % (ref 11.5–14.9)
PLATELET # BLD: 194 K/UL (ref 150–450)
PLATELET ESTIMATE: ABNORMAL
PMV BLD AUTO: 8.3 FL (ref 6–12)
POTASSIUM SERPL-SCNC: 4.1 MMOL/L (ref 3.7–5.3)
PROSTATE SPECIFIC ANTIGEN: 0.54 UG/L
RBC # BLD: 4.95 M/UL (ref 4.5–5.9)
RBC # BLD: ABNORMAL 10*6/UL
SEG NEUTROPHILS: 52 % (ref 36–66)
SEGMENTED NEUTROPHILS ABSOLUTE COUNT: 2.9 K/UL (ref 1.3–9.1)
SODIUM BLD-SCNC: 137 MMOL/L (ref 135–144)
TOTAL PROTEIN: 6.9 G/DL (ref 6.4–8.3)
TRIGL SERPL-MCNC: 119 MG/DL
VLDLC SERPL CALC-MCNC: ABNORMAL MG/DL (ref 1–30)
WBC # BLD: 5.5 K/UL (ref 3.5–11)
WBC # BLD: ABNORMAL 10*3/UL

## 2019-03-02 PROCEDURE — 85025 COMPLETE CBC W/AUTO DIFF WBC: CPT

## 2019-03-02 PROCEDURE — 82360 CALCULUS ASSAY QUANT: CPT

## 2019-03-02 PROCEDURE — 80053 COMPREHEN METABOLIC PANEL: CPT

## 2019-03-02 PROCEDURE — G0103 PSA SCREENING: HCPCS

## 2019-03-02 PROCEDURE — 83036 HEMOGLOBIN GLYCOSYLATED A1C: CPT

## 2019-03-02 PROCEDURE — 82365 CALCULUS SPECTROSCOPY: CPT

## 2019-03-02 PROCEDURE — 80061 LIPID PANEL: CPT

## 2019-03-02 PROCEDURE — 88300 SURGICAL PATH GROSS: CPT

## 2019-03-02 PROCEDURE — 36415 COLL VENOUS BLD VENIPUNCTURE: CPT

## 2019-03-03 LAB
ESTIMATED AVERAGE GLUCOSE: 140 MG/DL
HBA1C MFR BLD: 6.5 % (ref 4–6)

## 2019-03-04 LAB — SURGICAL PATHOLOGY REPORT: NORMAL

## 2019-04-15 ENCOUNTER — OFFICE VISIT (OUTPATIENT)
Dept: UROLOGY | Age: 59
End: 2019-04-15
Payer: COMMERCIAL

## 2019-04-15 VITALS
BODY MASS INDEX: 30.61 KG/M2 | OXYGEN SATURATION: 97 % | DIASTOLIC BLOOD PRESSURE: 88 MMHG | HEART RATE: 74 BPM | SYSTOLIC BLOOD PRESSURE: 118 MMHG | HEIGHT: 68 IN | WEIGHT: 201.94 LBS | TEMPERATURE: 98 F

## 2019-04-15 DIAGNOSIS — N20.0 KIDNEY STONE: Primary | ICD-10-CM

## 2019-04-15 PROCEDURE — 99213 OFFICE O/P EST LOW 20 MIN: CPT | Performed by: UROLOGY

## 2019-04-15 PROCEDURE — G8427 DOCREV CUR MEDS BY ELIG CLIN: HCPCS | Performed by: UROLOGY

## 2019-04-15 PROCEDURE — 3017F COLORECTAL CA SCREEN DOC REV: CPT | Performed by: UROLOGY

## 2019-04-15 PROCEDURE — G8417 CALC BMI ABV UP PARAM F/U: HCPCS | Performed by: UROLOGY

## 2019-04-15 PROCEDURE — 1036F TOBACCO NON-USER: CPT | Performed by: UROLOGY

## 2019-04-15 ASSESSMENT — ENCOUNTER SYMPTOMS
VOMITING: 0
COUGH: 0
SHORTNESS OF BREATH: 0
DIARRHEA: 0
NAUSEA: 0
BACK PAIN: 1
ABDOMINAL PAIN: 0
CONSTIPATION: 0
EYE PAIN: 0
EYE REDNESS: 0
WHEEZING: 0

## 2019-04-15 NOTE — PROGRESS NOTES
[facility]  [facility] [place]  [address]  [address] [facility] [address]  Dept: [address] [facility] Urology Office Note - Established    Patient:  Kandice Reyes  YOB: 1960  Date: 4/15/2019    The patient is a 64 y.o. male who presents todayfor evaluation of the following problems:   Chief Complaint   Patient presents with    Follow-up     3 month lab f/u        HPI  H/o stones. No pain. Here to review labs and blood work. Summary of old records: N/A    Additional History: N/A    Procedures Today: N/A    Urinalysis today:  No results found for this visit on 04/15/19.   Last several PSA's:  Lab Results   Component Value Date    PSA 0.54 03/02/2019    PSA 0.42 05/02/2015     Last total testosterone:  No results found for: TESTOSTERONE    AUA Symptom Score (4/15/2019):  INCOMPLETE EMPTYING: How often have you had the sensation of not emptying your bladder?: Not at all  FREQUENCY: How often do you have to urinate less than every two hours?: Not at all  INTERMITTENCY: How often have you found you stopped and started again several times when you urinated?: Not at all  URGENCY: How often have you found it difficult to postpone urination?: Not at all  WEAK STREAM: How often have you had a weak urinary stream?: Not at all  STRAINING: How often have you had to strain to start  urination?: Not at all  NOCTURIA: How many times did you typically get up at night to uriniate?: NONE  TOTAL I-PSS SCORE[de-identified] 0  How would you feel if you were to spend the rest of your life with your urinary condition?: Pleased    Last BUN and creatinine:  Lab Results   Component Value Date    BUN 14 03/02/2019     Lab Results   Component Value Date    CREATININE 0.76 03/02/2019       Additional Lab/Culture results: none    Imaging Reviewed during this Office Visit: none  (results were independently reviewed by physician and radiology report verified)    PAST MEDICAL, [facility] UPDATE:  Past Medical History:   Diagnosis Date    Chronic back pain     Headache(784.0)     Kidney stones     Osteoarthritis     Type II or unspecified type diabetes mellitus without mention of complication, not stated as uncontrolled     diet controlled     Past Surgical History:   Procedure Laterality Date    COLONOSCOPY  2012    LITHOTRIPSY  08/2018    SPINE SURGERY      laminectomy X2 and Spinal Cord stimulartor implant    URETER STENT PLACEMENT       Family History   Problem Relation Age of Onset    Diabetes Mother     High Blood Pressure Mother     Diabetes Father     Glaucoma Father     Eczema Father      Outpatient Medications Marked as Taking for the 4/15/19 encounter (Office Visit) with Seth Sue MD   Medication Sig Dispense Refill    HYDROcodone-acetaminophen (NORCO) 5-325 MG per tablet Take 1 tablet by mouth every 4 hours as needed for Pain for up to 30 days. 30 tablet 0    pregabalin (LYRICA) 150 MG capsule TAKE ONE CAPSULE BY MOUTH TWICE A DAY 60 capsule 3    ondansetron (ZOFRAN) 4 MG tablet Take 1 tablet by mouth every 8 hours as needed for Nausea 60 tablet 1    ibuprofen (ADVIL;MOTRIN) 800 MG tablet Take 1 tablet by mouth every 6 hours as needed for Pain 240 tablet 1       Morphine and Fentanyl  Social History     Tobacco Use   Smoking Status Never Smoker   Smokeless Tobacco Never Used     (Ifpatient a smoker, smoking cessation counseling offered)    Social History     Substance and Sexual Activity   Alcohol Use Yes    Comment: rarly       REVIEW OF SYSTEMS:  Review of Systems    Physical Exam:      Vitals:    04/15/19 1517   BP: 118/88   Pulse: 74   Temp: 98 °F (36.7 °C)   SpO2: 97%     Body mass index is 30.71 kg/m². Patient is a 64 y.o. male in no acute distress and alert and oriented to person, place and time. Physical Exam  Constitutional: Patient in no acute distress. Neuro: Alert and oriented to person, place and time.   Psych: Mood normal, affect normal  Skin: No rash noted  HEENT: Head: Normocephalic andatraumatic  Conjunctivae and EOM are normal. Pupils are equal, round  Nose:Normal  Right External Ear: Normal; Left External Ear: Normal  Mouth: Mucosa Moist  Neck: Supple  Lungs: Respiratory effort is normal  Cardiovascular: Warm & Pink  Abdomen: Soft, non-tender, non-distended with no CVA,  No flank tenderness,  Or hepatosplenomegaly   Lymphatics: No palpablelymphadenopathy. Assessment and Plan      1. Kidney stone           Plan:   Reviewed lab s and 24 hour urine. 24 hour urine was collected when pt was very ill. Will repeat 24 hour urine  F/u after above. Return in about 6 weeks (around 5/27/2019). Prescriptions Ordered:  No orders of the defined types were placed in this encounter. Orders Placed:  Orders Placed This Encounter   Procedures    [address] [place] [address]     Standing Status:   Future     Standing Expiration Date:   4/15/2020           Ann Marie Bush MD    Agree with the ROS entered by the MA.

## 2019-04-15 NOTE — PROGRESS NOTES
Review of Systems   Constitutional: Negative for appetite change, chills and fatigue. Eyes: Negative for pain, redness and visual disturbance. Respiratory: Negative for cough, shortness of breath and wheezing. Cardiovascular: Negative for chest pain and leg swelling. Gastrointestinal: Negative for abdominal pain, constipation, diarrhea, nausea and vomiting. Genitourinary: Negative for difficulty urinating, dysuria, flank pain, frequency, hematuria and urgency. Musculoskeletal: Positive for back pain. Negative for joint swelling and myalgias. Skin: Negative for rash and wound. Neurological: Negative for dizziness, weakness and numbness. Hematological: Does not bruise/bleed easily.

## 2019-04-30 ENCOUNTER — TELEPHONE (OUTPATIENT)
Dept: UROLOGY | Age: 59
End: 2019-04-30

## 2019-04-30 NOTE — TELEPHONE ENCOUNTER
Called pt regarding appt on 6/3/19. Due to provider not being in office, new appt is on 6/5/19 at 3:10 PM. Pt did not answer, left voicemail. Letter sent.

## 2019-05-01 DIAGNOSIS — N20.0 KIDNEY STONE: ICD-10-CM

## 2019-06-05 ENCOUNTER — OFFICE VISIT (OUTPATIENT)
Dept: UROLOGY | Age: 59
End: 2019-06-05
Payer: COMMERCIAL

## 2019-06-05 VITALS
SYSTOLIC BLOOD PRESSURE: 125 MMHG | WEIGHT: 201.2 LBS | BODY MASS INDEX: 30.49 KG/M2 | DIASTOLIC BLOOD PRESSURE: 85 MMHG | HEIGHT: 68 IN | TEMPERATURE: 98.1 F | HEART RATE: 68 BPM

## 2019-06-05 DIAGNOSIS — R82.993 HYPERURICOSURIA: ICD-10-CM

## 2019-06-05 DIAGNOSIS — N20.0 KIDNEY STONE: Primary | ICD-10-CM

## 2019-06-05 DIAGNOSIS — R82.994 HYPERCALCIURIA: ICD-10-CM

## 2019-06-05 PROCEDURE — 3017F COLORECTAL CA SCREEN DOC REV: CPT | Performed by: UROLOGY

## 2019-06-05 PROCEDURE — G8427 DOCREV CUR MEDS BY ELIG CLIN: HCPCS | Performed by: UROLOGY

## 2019-06-05 PROCEDURE — 99214 OFFICE O/P EST MOD 30 MIN: CPT | Performed by: UROLOGY

## 2019-06-05 PROCEDURE — 1036F TOBACCO NON-USER: CPT | Performed by: UROLOGY

## 2019-06-05 PROCEDURE — G8417 CALC BMI ABV UP PARAM F/U: HCPCS | Performed by: UROLOGY

## 2019-06-05 ASSESSMENT — ENCOUNTER SYMPTOMS
COUGH: 0
EYE REDNESS: 0
WHEEZING: 0
SHORTNESS OF BREATH: 0
EYE PAIN: 0
DIARRHEA: 0
CONSTIPATION: 0
VOMITING: 0
ABDOMINAL PAIN: 0
NAUSEA: 0
BACK PAIN: 1

## 2019-06-05 NOTE — PROGRESS NOTES
[facility]  [facility] UROLOGY SPECIALISTS - [place]  [email or web address]  [address] [place] [address] AT THE [facility] [id number]  Dept:  Heather Mathew [facility] Urology Office Note - Established    Patient:  Miguel Ching  YOB: 1960  Date: 6/5/2019    The patient is a 64 y.o. male who presents todayfor evaluation of the following problems:   Chief Complaint   Patient presents with    Follow-up     6 weeks with Lithol[facility]       HPI  Pt has h/o stones. Here to review litholink. Pt has hypercalciuria, borderline, and hyperuricosuria, borderline. Summary of old records: N/A    Additional History: N/A    Procedures Today: N/A    Urinalysis today:  No results found for this visit on 06/05/19.   Last several PSA's:  Lab Results   Component Value Date    PSA 0.54 03/02/2019    PSA 0.42 05/02/2015     Last total testosterone:  No results found for: TESTOSTERONE    AUA Symptom Score (6/5/2019):  INCOMPLETE EMPTYING: How often have you had the sensation of not emptying your bladder?: Not at all  FREQUENCY: How often do you have to urinate less than every two hours?: Less than 1 to 5 times  INTERMITTENCY: How often have you found you stopped and started again several times when you urinated?: Not at all  URGENCY: How often have you found it difficult to postpone urination?: Not at all  WEAK STREAM: How often have you had a weak urinary stream?: Not at all  STRAINING: How often have you had to strain to start  urination?: Not at all  NOCTURIA: How many times did you typically get up at night to uriniate?: NONE  TOTAL I-PSS SCORE[de-identified] 1  How would you feel if you were to spend the rest of your life with your urinary condition?: Delighted    Last BUN and creatinine:  Lab Results   Component Value Date    BUN 14 03/02/2019     Lab Results   Component Value Date    CREATININE 0.76 03/02/2019       Additional Lab/Culture results: litholink hyperuricosuria and hypercalciuria, both borderline    Imaging Reviewed during this Office Visit: none  (results were independently reviewed by physician and radiology report verified)    PAST MEDICAL, FAMILY AND SOCIAL HISTORY UPDATE:  Past Medical History:   Diagnosis Date    Chronic back pain     Headache(784.0)     Kidney stones     Osteoarthritis     Type II or unspecified type diabetes mellitus without mention of complication, not stated as uncontrolled     diet controlled     Past Surgical History:   Procedure Laterality Date    COLONOSCOPY  2012    LITHOTRIPSY  08/2018    SPINE SURGERY      laminectomy X2 and Spinal Cord stimulartor implant    URETER STENT PLACEMENT       Family History   Problem Relation Age of Onset    Diabetes Mother     High Blood Pressure Mother     Diabetes Father     Glaucoma Father     Eczema Father      Outpatient Medications Marked as Taking for the 6/5/19 encounter (Office Visit) with Sumit Lynch MD   Medication Sig Dispense Refill    HYDROcodone-acetaminophen (NORCO) 5-325 MG per tablet Take 1 tablet by mouth every 4 hours as needed for Pain for up to 30 days. 30 tablet 0    pregabalin (LYRICA) 150 MG capsule TAKE ONE CAPSULE BY MOUTH TWICE A DAY 60 capsule 3    tamsulosin (FLOMAX) 0.4 MG capsule Take 1 capsule by mouth daily for 7 doses 7 capsule 0    ondansetron (ZOFRAN) 4 MG tablet Take 1 tablet by mouth every 8 hours as needed for Nausea 60 tablet 1    ibuprofen (ADVIL;MOTRIN) 800 MG tablet Take 1 tablet by mouth every 6 hours as needed for Pain 240 tablet 1       Morphine and Fentanyl  Social History     Tobacco Use   Smoking Status Never Smoker   Smokeless Tobacco Never Used     (Ifpatient a smoker, smoking cessation counseling offered)    Social History     Substance and Sexual Activity   Alcohol Use Yes    Comment: rarly       REVIEW OF SYSTEMS:  Review of Systems    Physical Exam:      Vitals:    06/05/19 0942   BP: 125/85   Pulse: 68   Temp: 98.1 °F (36.7 °C)     Body mass index is 30.59 kg/m².   Patient is a 64 y.o. male in no acute distress and alert and oriented to person, place and time. Physical Exam  Constitutional: Patient in no acute distress. Neuro: Alert and oriented to person, place and time. Psych: Mood normal, affect normal  Skin: No rash noted  HEENT: Head: Normocephalic andatraumatic  Conjunctivae and EOM are normal. Pupils are equal, round  Nose:Normal  Right External Ear: Normal; Left External Ear: Normal  Mouth: Mucosa Moist  Neck: Supple  Lungs: Respiratory effort is normal  Cardiovascular: Warm & Pink  Abdomen: Soft, non-tender, non-distended with no CVA,  No flank tenderness,  Or hepatosplenomegaly   Lymphatics: No palpablelymphadenopathy. Bladder non-tender and not distended. Musculoskeletal: Normal gait and station      Assessment and Plan      1. Kidney stone    2. Hypercalciuria    3. Hyperuricosuria           Plan:   Cont hydration  kub with f/u in 6 mo      Return in about 6 months (around 12/5/2019). Prescriptions Ordered:  No orders of the defined types were placed in this encounter. Orders Placed:  Orders Placed This Encounter   Procedures    XR ABDOMEN (KUB) (SINGLE AP VIEW)     Standing Status:   Future     Standing Expiration Date:   6/5/2020     Order Specific Question:   Reason for exam:     Answer:   kidney stone           Navdeep Hanna MD    Agree with the ROS entered by the MA.

## 2019-10-18 ENCOUNTER — HOSPITAL ENCOUNTER (OUTPATIENT)
Facility: CLINIC | Age: 59
Discharge: HOME | End: 2019-10-20
Payer: COMMERCIAL

## 2019-10-18 ENCOUNTER — HOSPITAL ENCOUNTER (OUTPATIENT)
Dept: GENERAL RADIOLOGY | Facility: CLINIC | Age: 59
Discharge: HOME | End: 2019-10-20
Payer: COMMERCIAL

## 2019-10-18 DIAGNOSIS — M25.511 ACUTE PAIN OF RIGHT SHOULDER: ICD-10-CM

## 2019-10-18 PROCEDURE — 73030 X-RAY EXAM OF SHOULDER: CPT

## 2019-12-09 ENCOUNTER — OFFICE VISIT (OUTPATIENT)
Dept: UROLOGY | Age: 59
End: 2019-12-09
Payer: COMMERCIAL

## 2019-12-09 ENCOUNTER — HOSPITAL ENCOUNTER (OUTPATIENT)
Age: 59
Discharge: HOME | End: 2019-12-11
Payer: COMMERCIAL

## 2019-12-09 ENCOUNTER — HOSPITAL ENCOUNTER (OUTPATIENT)
Dept: GENERAL RADIOLOGY | Age: 59
Discharge: HOME | End: 2019-12-11
Payer: COMMERCIAL

## 2019-12-09 VITALS
TEMPERATURE: 98.3 F | HEIGHT: 68 IN | WEIGHT: 195.4 LBS | DIASTOLIC BLOOD PRESSURE: 88 MMHG | SYSTOLIC BLOOD PRESSURE: 130 MMHG | BODY MASS INDEX: 29.61 KG/M2

## 2019-12-09 DIAGNOSIS — N20.0 KIDNEY STONE: ICD-10-CM

## 2019-12-09 DIAGNOSIS — N20.0 KIDNEY STONE: Primary | ICD-10-CM

## 2019-12-09 DIAGNOSIS — R82.993 HYPERURICOSURIA: ICD-10-CM

## 2019-12-09 DIAGNOSIS — R82.994 HYPERCALCIURIA: ICD-10-CM

## 2019-12-09 PROCEDURE — 99213 OFFICE O/P EST LOW 20 MIN: CPT | Performed by: UROLOGY

## 2019-12-09 PROCEDURE — G8427 DOCREV CUR MEDS BY ELIG CLIN: HCPCS | Performed by: UROLOGY

## 2019-12-09 PROCEDURE — 3017F COLORECTAL CA SCREEN DOC REV: CPT | Performed by: UROLOGY

## 2019-12-09 PROCEDURE — 1036F TOBACCO NON-USER: CPT | Performed by: UROLOGY

## 2019-12-09 PROCEDURE — G8484 FLU IMMUNIZE NO ADMIN: HCPCS | Performed by: UROLOGY

## 2019-12-09 PROCEDURE — 74018 RADEX ABDOMEN 1 VIEW: CPT

## 2019-12-09 PROCEDURE — G8417 CALC BMI ABV UP PARAM F/U: HCPCS | Performed by: UROLOGY

## 2019-12-09 ASSESSMENT — ENCOUNTER SYMPTOMS
WHEEZING: 0
NAUSEA: 0
CONSTIPATION: 0
SHORTNESS OF BREATH: 0
DIARRHEA: 0
BACK PAIN: 1
COUGH: 0
VOMITING: 0
EYE REDNESS: 0
ABDOMINAL PAIN: 0
EYE PAIN: 0

## 2020-01-20 ENCOUNTER — OFFICE VISIT (OUTPATIENT)
Dept: NEUROSURGERY | Age: 60
End: 2020-01-20
Payer: COMMERCIAL

## 2020-01-20 VITALS
OXYGEN SATURATION: 96 % | WEIGHT: 198.2 LBS | BODY MASS INDEX: 30.04 KG/M2 | SYSTOLIC BLOOD PRESSURE: 127 MMHG | DIASTOLIC BLOOD PRESSURE: 78 MMHG | HEART RATE: 66 BPM | HEIGHT: 68 IN

## 2020-01-20 PROCEDURE — 1036F TOBACCO NON-USER: CPT | Performed by: NEUROLOGICAL SURGERY

## 2020-01-20 PROCEDURE — G8417 CALC BMI ABV UP PARAM F/U: HCPCS | Performed by: NEUROLOGICAL SURGERY

## 2020-01-20 PROCEDURE — G8427 DOCREV CUR MEDS BY ELIG CLIN: HCPCS | Performed by: NEUROLOGICAL SURGERY

## 2020-01-20 PROCEDURE — 99203 OFFICE O/P NEW LOW 30 MIN: CPT | Performed by: NEUROLOGICAL SURGERY

## 2020-01-20 PROCEDURE — G8484 FLU IMMUNIZE NO ADMIN: HCPCS | Performed by: NEUROLOGICAL SURGERY

## 2020-01-20 PROCEDURE — 3017F COLORECTAL CA SCREEN DOC REV: CPT | Performed by: NEUROLOGICAL SURGERY

## 2020-01-20 NOTE — PROGRESS NOTES
[address] [address]  Dept: [phone]    Patient:  Antione Hines  YOB: 1960  Date: 1/20/20    The patient is a 64 y.o. male who presents today for consult of the following problems:     Chief Complaint   Patient presents with    Back Pain     Spinal Cord Stimulator discussion              HPI:     Antione Hines is a 64 y.o. male on whom neurosurgical consultation was requested by PAUL Schmitt CNP for management of pain from implanted hardware. The patient had a percutaneous lead placed in 2011 at [facility]. Over the course of the last 3 to 6 months he has completely stopped using it and has turned it off. He states that even when it is on he does not get any adequate pain relief for his baseline axial back pain with radicular symptoms in bilateral lower extremities. As he is lost weight he has noticed a significant amount of discomfort in the right flank upper buttock region where the battery pocket is seated. It does appear to be an older model generator that is fairly bulky and of larger size and it was also fairly close to the skin surface which is likely what is causing his discomfort when he sits or puts any weight on that region. No fevers chills nausea vomiting or any other signs of infection that are active. Vasyl Lopez       History:     Past Medical History:   Diagnosis Date    Chronic back pain     Headache(784.0)     Kidney stones     Osteoarthritis     Type II or unspecified type diabetes mellitus without mention of complication, not stated as uncontrolled     diet controlled     Past Surgical History:   Procedure Laterality Date    COLONOSCOPY  2012    LITHOTRIPSY  08/2018    SPINE SURGERY      laminectomy X2 and Spinal Cord stimulartor implant    URETER STENT PLACEMENT       Family History   Problem Relation Age of Onset    Diabetes Mother     High Blood Pressure Mother    Chauncey implanted hardware, initial encounter          Plan: We will plan for explantation of the stimulator including generator and lead with anchor. Did discuss with the patient that this does not exclude him with the possibility of future future implantation if he deems it to be appropriate. Considering significant mount of scarring from prior stimulator implantation he would likely require a paddle stimulator implantation if any future endeavor is sought. He is currently not interested in any trial or reimplantation of a new device. Followup: No follow-ups on file. Prescriptions Ordered:  No orders of the defined types were placed in this encounter. Orders Placed:  No orders of the defined types were placed in this encounter. Electronically signed by Jeffery Jensen DO on 1/20/2020 at 12:11 PM    Please note that this chart was generated using voice recognition Dragon dictation software. Although every effort was made to ensure the accuracy of this automated transcription, some errors in transcription may have occurred.

## 2020-01-29 ENCOUNTER — ANESTHESIA EVENT (OUTPATIENT)
Dept: OPERATING ROOM | Age: 60
End: 2020-01-29
Payer: COMMERCIAL

## 2020-01-30 ENCOUNTER — ANESTHESIA (OUTPATIENT)
Dept: OPERATING ROOM | Age: 60
End: 2020-01-30
Payer: COMMERCIAL

## 2020-01-30 ENCOUNTER — HOSPITAL ENCOUNTER (OUTPATIENT)
Age: 60
Setting detail: OUTPATIENT SURGERY
Discharge: HOME | End: 2020-01-30
Attending: NEUROLOGICAL SURGERY | Admitting: NEUROLOGICAL SURGERY
Payer: COMMERCIAL

## 2020-01-30 ENCOUNTER — APPOINTMENT (OUTPATIENT)
Dept: GENERAL RADIOLOGY | Age: 60
End: 2020-01-30
Attending: NEUROLOGICAL SURGERY
Payer: COMMERCIAL

## 2020-01-30 VITALS
BODY MASS INDEX: 29.47 KG/M2 | RESPIRATION RATE: 20 BRPM | OXYGEN SATURATION: 97 % | HEART RATE: 58 BPM | TEMPERATURE: 97.5 F | DIASTOLIC BLOOD PRESSURE: 73 MMHG | SYSTOLIC BLOOD PRESSURE: 123 MMHG | WEIGHT: 194.45 LBS | HEIGHT: 68 IN

## 2020-01-30 VITALS
DIASTOLIC BLOOD PRESSURE: 112 MMHG | SYSTOLIC BLOOD PRESSURE: 166 MMHG | OXYGEN SATURATION: 100 % | TEMPERATURE: 96.3 F | RESPIRATION RATE: 16 BRPM

## 2020-01-30 LAB
ABO/RH: NORMAL
ANTIBODY SCREEN: NEGATIVE
ARM BAND NUMBER: NORMAL
EXPIRATION DATE: NORMAL
GLUCOSE BLD-MCNC: 140 MG/DL (ref 75–110)
GLUCOSE BLD-MCNC: 147 MG/DL (ref 75–110)

## 2020-01-30 PROCEDURE — 7100000010 HC PHASE II RECOVERY - FIRST 15 MIN: Performed by: NEUROLOGICAL SURGERY

## 2020-01-30 PROCEDURE — 7100000000 HC PACU RECOVERY - FIRST 15 MIN: Performed by: NEUROLOGICAL SURGERY

## 2020-01-30 PROCEDURE — 82947 ASSAY GLUCOSE BLOOD QUANT: CPT

## 2020-01-30 PROCEDURE — 86901 BLOOD TYPING SEROLOGIC RH(D): CPT

## 2020-01-30 PROCEDURE — 2580000003 HC RX 258: Performed by: NEUROLOGICAL SURGERY

## 2020-01-30 PROCEDURE — 3600000004 HC SURGERY LEVEL 4 BASE: Performed by: NEUROLOGICAL SURGERY

## 2020-01-30 PROCEDURE — 86850 RBC ANTIBODY SCREEN: CPT

## 2020-01-30 PROCEDURE — 86900 BLOOD TYPING SEROLOGIC ABO: CPT

## 2020-01-30 PROCEDURE — 2500000003 HC RX 250 WO HCPCS: Performed by: NURSE ANESTHETIST, CERTIFIED REGISTERED

## 2020-01-30 PROCEDURE — 2709999900 HC NON-CHARGEABLE SUPPLY: Performed by: NEUROLOGICAL SURGERY

## 2020-01-30 PROCEDURE — 88300 SURGICAL PATH GROSS: CPT

## 2020-01-30 PROCEDURE — 2500000003 HC RX 250 WO HCPCS: Performed by: NEUROLOGICAL SURGERY

## 2020-01-30 PROCEDURE — 3700000000 HC ANESTHESIA ATTENDED CARE: Performed by: NEUROLOGICAL SURGERY

## 2020-01-30 PROCEDURE — 7100000001 HC PACU RECOVERY - ADDTL 15 MIN: Performed by: NEUROLOGICAL SURGERY

## 2020-01-30 PROCEDURE — 63688 REV/RMV IMP SP NPG/R DTCH CN: CPT | Performed by: NEUROLOGICAL SURGERY

## 2020-01-30 PROCEDURE — 63661 REMOVE SPINE ELTRD PERQ ARAY: CPT | Performed by: NEUROLOGICAL SURGERY

## 2020-01-30 PROCEDURE — 6370000000 HC RX 637 (ALT 250 FOR IP): Performed by: NEUROLOGICAL SURGERY

## 2020-01-30 PROCEDURE — 3600000014 HC SURGERY LEVEL 4 ADDTL 15MIN: Performed by: NEUROLOGICAL SURGERY

## 2020-01-30 PROCEDURE — 3700000001 HC ADD 15 MINUTES (ANESTHESIA): Performed by: NEUROLOGICAL SURGERY

## 2020-01-30 PROCEDURE — 72020 X-RAY EXAM OF SPINE 1 VIEW: CPT

## 2020-01-30 PROCEDURE — 6360000002 HC RX W HCPCS: Performed by: NURSE ANESTHETIST, CERTIFIED REGISTERED

## 2020-01-30 PROCEDURE — 2580000003 HC RX 258: Performed by: ANESTHESIOLOGY

## 2020-01-30 RX ORDER — GINSENG 100 MG
CAPSULE ORAL PRN
Status: DISCONTINUED | OUTPATIENT
Start: 2020-01-30 | End: 2020-01-30 | Stop reason: ALTCHOICE

## 2020-01-30 RX ORDER — FENTANYL CITRATE 50 UG/ML
50 INJECTION, SOLUTION INTRAMUSCULAR; INTRAVENOUS EVERY 5 MIN PRN
Status: DISCONTINUED | OUTPATIENT
Start: 2020-01-30 | End: 2020-01-30 | Stop reason: HOSPADM

## 2020-01-30 RX ORDER — MIDAZOLAM HYDROCHLORIDE 1 MG/ML
2 INJECTION INTRAMUSCULAR; INTRAVENOUS
Status: DISCONTINUED | OUTPATIENT
Start: 2020-01-30 | End: 2020-01-30 | Stop reason: HOSPADM

## 2020-01-30 RX ORDER — ONDANSETRON 2 MG/ML
4 INJECTION INTRAMUSCULAR; INTRAVENOUS ONCE
Status: DISCONTINUED | OUTPATIENT
Start: 2020-01-30 | End: 2020-01-30 | Stop reason: HOSPADM

## 2020-01-30 RX ORDER — PROPOFOL 10 MG/ML
INJECTION, EMULSION INTRAVENOUS PRN
Status: DISCONTINUED | OUTPATIENT
Start: 2020-01-30 | End: 2020-01-30 | Stop reason: SDUPTHER

## 2020-01-30 RX ORDER — GLYCOPYRROLATE 1 MG/5 ML
SYRINGE (ML) INTRAVENOUS PRN
Status: DISCONTINUED | OUTPATIENT
Start: 2020-01-30 | End: 2020-01-30 | Stop reason: SDUPTHER

## 2020-01-30 RX ORDER — ONDANSETRON 2 MG/ML
4 INJECTION INTRAMUSCULAR; INTRAVENOUS
Status: DISCONTINUED | OUTPATIENT
Start: 2020-01-30 | End: 2020-01-30 | Stop reason: HOSPADM

## 2020-01-30 RX ORDER — CEPHALEXIN 500 MG/1
500 CAPSULE ORAL 4 TIMES DAILY
Qty: 16 CAPSULE | Refills: 0 | Status: SHIPPED | OUTPATIENT
Start: 2020-01-30 | End: 2020-02-03

## 2020-01-30 RX ORDER — SODIUM CHLORIDE 0.9 % (FLUSH) 0.9 %
10 SYRINGE (ML) INJECTION PRN
Status: DISCONTINUED | OUTPATIENT
Start: 2020-01-30 | End: 2020-01-30 | Stop reason: HOSPADM

## 2020-01-30 RX ORDER — SODIUM CHLORIDE 0.9 % (FLUSH) 0.9 %
10 SYRINGE (ML) INJECTION EVERY 12 HOURS SCHEDULED
Status: DISCONTINUED | OUTPATIENT
Start: 2020-01-30 | End: 2020-01-30 | Stop reason: HOSPADM

## 2020-01-30 RX ORDER — DEXAMETHASONE SODIUM PHOSPHATE 10 MG/ML
INJECTION INTRAMUSCULAR; INTRAVENOUS PRN
Status: DISCONTINUED | OUTPATIENT
Start: 2020-01-30 | End: 2020-01-30 | Stop reason: SDUPTHER

## 2020-01-30 RX ORDER — KETOROLAC TROMETHAMINE 30 MG/ML
INJECTION, SOLUTION INTRAMUSCULAR; INTRAVENOUS PRN
Status: DISCONTINUED | OUTPATIENT
Start: 2020-01-30 | End: 2020-01-30 | Stop reason: SDUPTHER

## 2020-01-30 RX ORDER — SODIUM CHLORIDE, SODIUM LACTATE, POTASSIUM CHLORIDE, CALCIUM CHLORIDE 600; 310; 30; 20 MG/100ML; MG/100ML; MG/100ML; MG/100ML
INJECTION, SOLUTION INTRAVENOUS CONTINUOUS
Status: DISCONTINUED | OUTPATIENT
Start: 2020-01-30 | End: 2020-01-30 | Stop reason: HOSPADM

## 2020-01-30 RX ORDER — LIDOCAINE HYDROCHLORIDE AND EPINEPHRINE 10; 10 MG/ML; UG/ML
INJECTION, SOLUTION INFILTRATION; PERINEURAL PRN
Status: DISCONTINUED | OUTPATIENT
Start: 2020-01-30 | End: 2020-01-30 | Stop reason: ALTCHOICE

## 2020-01-30 RX ORDER — ROCURONIUM BROMIDE 10 MG/ML
INJECTION, SOLUTION INTRAVENOUS PRN
Status: DISCONTINUED | OUTPATIENT
Start: 2020-01-30 | End: 2020-01-30 | Stop reason: SDUPTHER

## 2020-01-30 RX ORDER — LIDOCAINE HYDROCHLORIDE 10 MG/ML
INJECTION, SOLUTION INFILTRATION; PERINEURAL PRN
Status: DISCONTINUED | OUTPATIENT
Start: 2020-01-30 | End: 2020-01-30 | Stop reason: SDUPTHER

## 2020-01-30 RX ORDER — MIDAZOLAM HYDROCHLORIDE 1 MG/ML
INJECTION INTRAMUSCULAR; INTRAVENOUS PRN
Status: DISCONTINUED | OUTPATIENT
Start: 2020-01-30 | End: 2020-01-30 | Stop reason: SDUPTHER

## 2020-01-30 RX ORDER — ONDANSETRON 2 MG/ML
INJECTION INTRAMUSCULAR; INTRAVENOUS PRN
Status: DISCONTINUED | OUTPATIENT
Start: 2020-01-30 | End: 2020-01-30 | Stop reason: SDUPTHER

## 2020-01-30 RX ORDER — NEOSTIGMINE METHYLSULFATE 5 MG/5 ML
SYRINGE (ML) INTRAVENOUS PRN
Status: DISCONTINUED | OUTPATIENT
Start: 2020-01-30 | End: 2020-01-30 | Stop reason: SDUPTHER

## 2020-01-30 RX ORDER — FENTANYL CITRATE 50 UG/ML
25 INJECTION, SOLUTION INTRAMUSCULAR; INTRAVENOUS ONCE
Status: DISCONTINUED | OUTPATIENT
Start: 2020-01-30 | End: 2020-01-30 | Stop reason: HOSPADM

## 2020-01-30 RX ORDER — MIDAZOLAM HYDROCHLORIDE 1 MG/ML
1 INJECTION INTRAMUSCULAR; INTRAVENOUS EVERY 10 MIN PRN
Status: DISCONTINUED | OUTPATIENT
Start: 2020-01-30 | End: 2020-01-30 | Stop reason: HOSPADM

## 2020-01-30 RX ADMIN — MIDAZOLAM HYDROCHLORIDE 2 MG: 1 INJECTION, SOLUTION INTRAMUSCULAR; INTRAVENOUS at 07:31

## 2020-01-30 RX ADMIN — DEXAMETHASONE SODIUM PHOSPHATE 10 MG: 10 INJECTION INTRAMUSCULAR; INTRAVENOUS at 07:55

## 2020-01-30 RX ADMIN — PROPOFOL 200 MG: 10 INJECTION, EMULSION INTRAVENOUS at 07:31

## 2020-01-30 RX ADMIN — CEFAZOLIN 2 G: 10 INJECTION, POWDER, FOR SOLUTION INTRAVENOUS at 07:47

## 2020-01-30 RX ADMIN — PHENYLEPHRINE HYDROCHLORIDE 100 MCG: 10 INJECTION INTRAVENOUS at 08:17

## 2020-01-30 RX ADMIN — HYDROMORPHONE HYDROCHLORIDE 1 MG: 1 INJECTION, SOLUTION INTRAMUSCULAR; INTRAVENOUS; SUBCUTANEOUS at 07:31

## 2020-01-30 RX ADMIN — ONDANSETRON 4 MG: 2 INJECTION, SOLUTION INTRAMUSCULAR; INTRAVENOUS at 08:17

## 2020-01-30 RX ADMIN — ROCURONIUM BROMIDE 50 MG: 10 INJECTION INTRAVENOUS at 07:31

## 2020-01-30 RX ADMIN — LIDOCAINE HYDROCHLORIDE 50 MG: 10 INJECTION, SOLUTION INFILTRATION; PERINEURAL at 07:31

## 2020-01-30 RX ADMIN — PHENYLEPHRINE HYDROCHLORIDE 100 MCG: 10 INJECTION INTRAVENOUS at 08:08

## 2020-01-30 RX ADMIN — Medication 0.2 MG: at 08:08

## 2020-01-30 RX ADMIN — SODIUM CHLORIDE, POTASSIUM CHLORIDE, SODIUM LACTATE AND CALCIUM CHLORIDE: 600; 310; 30; 20 INJECTION, SOLUTION INTRAVENOUS at 06:30

## 2020-01-30 RX ADMIN — Medication 4 MG: at 08:20

## 2020-01-30 RX ADMIN — KETOROLAC TROMETHAMINE 30 MG: 30 INJECTION, SOLUTION INTRAMUSCULAR; INTRAVENOUS at 08:28

## 2020-01-30 RX ADMIN — Medication 0.8 MG: at 08:20

## 2020-01-30 ASSESSMENT — PULMONARY FUNCTION TESTS
PIF_VALUE: 16
PIF_VALUE: 18
PIF_VALUE: 18
PIF_VALUE: 17
PIF_VALUE: 18
PIF_VALUE: 1
PIF_VALUE: 16
PIF_VALUE: 18
PIF_VALUE: 18
PIF_VALUE: 13
PIF_VALUE: 18
PIF_VALUE: 2
PIF_VALUE: 17
PIF_VALUE: 2
PIF_VALUE: 17
PIF_VALUE: 1
PIF_VALUE: 2
PIF_VALUE: 17
PIF_VALUE: 13
PIF_VALUE: 17
PIF_VALUE: 18
PIF_VALUE: 18
PIF_VALUE: 17
PIF_VALUE: 17
PIF_VALUE: 1
PIF_VALUE: 16
PIF_VALUE: 18
PIF_VALUE: 17
PIF_VALUE: 18
PIF_VALUE: 17
PIF_VALUE: 16
PIF_VALUE: 15
PIF_VALUE: 16
PIF_VALUE: 4
PIF_VALUE: 18
PIF_VALUE: 13
PIF_VALUE: 13
PIF_VALUE: 18
PIF_VALUE: 13
PIF_VALUE: 18
PIF_VALUE: 18
PIF_VALUE: 17
PIF_VALUE: 5
PIF_VALUE: 17
PIF_VALUE: 15
PIF_VALUE: 15
PIF_VALUE: 11
PIF_VALUE: 19
PIF_VALUE: 37
PIF_VALUE: 17
PIF_VALUE: 17
PIF_VALUE: 18
PIF_VALUE: 18
PIF_VALUE: 17
PIF_VALUE: 3
PIF_VALUE: 16
PIF_VALUE: 18
PIF_VALUE: 17
PIF_VALUE: 17
PIF_VALUE: 18
PIF_VALUE: 13

## 2020-01-30 ASSESSMENT — PAIN SCALES - GENERAL
PAINLEVEL_OUTOF10: 0

## 2020-01-30 ASSESSMENT — PAIN - FUNCTIONAL ASSESSMENT: PAIN_FUNCTIONAL_ASSESSMENT: 0-10

## 2020-01-30 NOTE — ANESTHESIA PRE PROCEDURE
Department of Anesthesiology  Preprocedure Note       Name:  Loy Polanco   Age:  64 y.o.  :  1960                                          MRN:  [id number]         Date:  2020      Surgeon: Dickson Bourgeois):  Rj Bradley DO    Procedure: SPINAL CORD STIMULATOR IMPLANT REMOVAL, ADITI TABLE, C-ARM (N/A )    Medications prior to admission:   Prior to Admission medications    Medication Sig Start Date End Date Taking? Authorizing Provider   HYDROcodone-acetaminophen (NORCO) 5-325 MG per tablet Take 1 tablet by mouth every 4 hours as needed for Pain for up to 30 days. 19 Yes PAUL Mendiola CNP   pregabalin (LYRICA) 150 MG capsule TAKE ONE CAPSULE BY MOUTH TWICE A DAY 3/18/19 1/30/20 Yes PAUL Mendiola CNP   ondansetron (ZOFRAN) 4 MG tablet Take 1 tablet by mouth every 8 hours as needed for Nausea 11/15/18  Yes PAUL Mendiola CNP   ibuprofen (ADVIL;MOTRIN) 800 MG tablet Take 1 tablet by mouth every 6 hours as needed for Pain 10/29/18   PAUL Calvillo CNP       Current medications:    Current Facility-Administered Medications   Medication Dose Route Frequency Provider Last Rate Last Dose    lactated ringers infusion   Intravenous Continuous Ayanna Collazo  mL/hr at 20 0630      midazolam (VERSED) injection 1 mg  1 mg Intravenous Q10 Min PRN Ayanna Collazo MD           Allergies:     Allergies   Allergen Reactions    Morphine Other (See Comments)     Patient developed suicidal ideations    Fentanyl Hives       Problem List:    Patient Active Problem List   Diagnosis Code    Chronic back pain M54.9, G89.29       Past Medical History:        Diagnosis Date    Chronic back pain     Headache(784.0)     Kidney stones     Osteoarthritis     Type II or unspecified type diabetes mellitus without mention of complication, not stated as uncontrolled     diet controlled       Past Surgical History:        Procedure Laterality Date    COLONOSCOPY

## 2020-01-30 NOTE — OP NOTE
was not broken and was completely intact. I confirmed preoperatively on fluoroscopy that the patient only had one stimulator lead. The lead anchor were removed entirely. Both wounds were inspected to ensure that there was no residual and an AP x-ray was taken to ensure that there was no residual hardware. After thoroughly irrigating both wounds the midline was also closed using 0 Vicryl for deep fascia followed by 2-0 Vicryl subcutaneous and staples for skin. Both wounds were dressed with bacitracin and island.   Patient was then flipped back supine onto the regular table awoken in stable condition extubated and returned to PACU      Sponge needle and instrument counts: correct

## 2020-01-30 NOTE — H&P
oxygen saturation is 97%. CONSTITUTIONAL: Alert and oriented times 3, no acute distress and cooperative to examination. friendly and pleasant , robust build     SKIN: rash No    HEENT: Head is normocephalic, atraumatic. EOMI, PERRLA    Oral air way :slightly narrow Yes and No    NECK: neck supple, no lymphadenopathy noted, trachea midline and straight       2+ carotid, no bruit    LUNGS: Chest expands equally bilaterally upon respiration, no accessory muscle used. Ausculation reveals no adventitious breath sounds. CARDIOVASCULAR: \"Heart sounds are normal.  Regular rate and rhythm without murmur,    ABDOMEN: Bowel sounds are present in all four quadrants      GENATALIA:Deferred. NEUROLOGIC: \"CN II-XII are grossly intact. EXTREMITIES: Pitting edema:  No,  Varicose veins: No     Dorsal pedal/posterior tibial pulses palpable: Yes         Strength:  Normal       Patient Active Problem List   Diagnosis    Chronic back pain               IMPRESSIONS:   1. Pain pump hardware pain   2. does not have any pertinent problems on file.     Dylan [facility]  Electronically signed 1/30/2020 at 6:44 AM       Scheduled for:  Spinal cord stimulator implant removal

## 2020-01-30 NOTE — PROGRESS NOTES
Dr. Tawnya Mckenzie notified that patient reports taking Motrin 800 mg 4-5 days ago Dr. Tawnya Mckenzie stated \" that's okay\" no new orders received

## 2020-01-31 LAB — SURGICAL PATHOLOGY REPORT: NORMAL

## 2020-02-13 ENCOUNTER — OFFICE VISIT (OUTPATIENT)
Dept: NEUROSURGERY | Age: 60
End: 2020-02-13

## 2020-02-13 VITALS
DIASTOLIC BLOOD PRESSURE: 77 MMHG | HEART RATE: 72 BPM | SYSTOLIC BLOOD PRESSURE: 133 MMHG | BODY MASS INDEX: 30.11 KG/M2 | WEIGHT: 198 LBS

## 2020-02-13 PROCEDURE — 99024 POSTOP FOLLOW-UP VISIT: CPT | Performed by: NURSE PRACTITIONER

## 2020-02-13 NOTE — PROGRESS NOTES
[address] [address]  Dept: [phone]    Patient:  Trena Livingston  YOB: 1960  Date: 2/13/20    The patient is a 64 y.o. male who presents today for consult of the following problems:     Chief Complaint   Patient presents with    Post-Op Check     Spinal cord stimulator removal-1/30         HPI:     Trena Livingston is a 64 y.o. male who presents to the office for post-op evaluation s/p explantation of spinal cord stimulator. Doing very well. Denies fever, chills, numbness or tingling. No drainage from the incision site. Pain very tolerable. Denies any numbness or tingling. Sensation intact  Strength equal bilaterally  Incision CDI    Date of surgery: 1/30/2020    Assessment and Plan:      1. Pain from implanted hardware, initial encounter          Plan: Patient doing very well postoperatively. Incision has healed well. Patient reports pain is very tolerable. Denies any concerns. No sensorimotor deficits present. Intact staples removed without difficulty. Patient tolerated well. Return to office with any issues. Followup: Return if symptoms worsen or fail to improve. Prescriptions Ordered:  No orders of the defined types were placed in this encounter. Orders Placed:  No orders of the defined types were placed in this encounter. Electronically signed by PAUL Damon CNP on 2/13/2020 at 3:34 PM    Please note that this chart was generated using voice recognition Dragon dictation software. Although every effort was made to ensure the accuracy of this automated transcription, some errors in transcription may have occurred.

## 2020-04-27 ENCOUNTER — TELEMEDICINE (OUTPATIENT)
Dept: NEUROSURGERY | Age: 60
End: 2020-04-27
Payer: COMMERCIAL

## 2020-04-27 VITALS — WEIGHT: 190 LBS | BODY MASS INDEX: 28.89 KG/M2

## 2020-04-27 PROCEDURE — 99213 OFFICE O/P EST LOW 20 MIN: CPT | Performed by: NEUROLOGICAL SURGERY

## 2020-04-27 PROCEDURE — G8427 DOCREV CUR MEDS BY ELIG CLIN: HCPCS | Performed by: NEUROLOGICAL SURGERY

## 2020-04-27 PROCEDURE — 3017F COLORECTAL CA SCREEN DOC REV: CPT | Performed by: NEUROLOGICAL SURGERY

## 2020-04-30 RX ORDER — CYCLOBENZAPRINE HCL 10 MG
10 TABLET ORAL 3 TIMES DAILY PRN
Qty: 21 TABLET | Refills: 0 | Status: SHIPPED | OUTPATIENT
Start: 2020-04-30 | End: 2020-05-10

## 2020-04-30 RX ORDER — PREDNISONE 20 MG/1
TABLET ORAL
Qty: 30 TABLET | Refills: 0 | Status: SHIPPED | OUTPATIENT
Start: 2020-04-30 | End: 2020-05-15

## 2020-05-15 ENCOUNTER — HOSPITAL ENCOUNTER (OUTPATIENT)
Dept: GENERAL RADIOLOGY | Age: 60
Discharge: HOME | End: 2020-05-17
Payer: COMMERCIAL

## 2020-05-15 ENCOUNTER — HOSPITAL ENCOUNTER (OUTPATIENT)
Dept: MRI IMAGING | Age: 60
Discharge: HOME | End: 2020-05-17
Payer: COMMERCIAL

## 2020-05-15 ENCOUNTER — HOSPITAL ENCOUNTER (OUTPATIENT)
Age: 60
Discharge: HOME | End: 2020-05-17
Payer: COMMERCIAL

## 2020-05-15 PROCEDURE — 72148 MRI LUMBAR SPINE W/O DYE: CPT

## 2020-05-15 PROCEDURE — 72120 X-RAY BEND ONLY L-S SPINE: CPT

## 2020-06-02 ENCOUNTER — OFFICE VISIT (OUTPATIENT)
Dept: ORTHOPEDIC SURGERY | Age: 60
End: 2020-06-02
Payer: COMMERCIAL

## 2020-06-02 VITALS — HEIGHT: 68 IN | BODY MASS INDEX: 28.79 KG/M2 | WEIGHT: 190 LBS | TEMPERATURE: 97 F

## 2020-06-02 PROCEDURE — 99203 OFFICE O/P NEW LOW 30 MIN: CPT | Performed by: ORTHOPAEDIC SURGERY

## 2020-06-02 PROCEDURE — 1036F TOBACCO NON-USER: CPT | Performed by: ORTHOPAEDIC SURGERY

## 2020-06-02 PROCEDURE — 3017F COLORECTAL CA SCREEN DOC REV: CPT | Performed by: ORTHOPAEDIC SURGERY

## 2020-06-02 PROCEDURE — G8417 CALC BMI ABV UP PARAM F/U: HCPCS | Performed by: ORTHOPAEDIC SURGERY

## 2020-06-02 PROCEDURE — G8427 DOCREV CUR MEDS BY ELIG CLIN: HCPCS | Performed by: ORTHOPAEDIC SURGERY

## 2020-06-02 RX ORDER — DICLOFENAC SODIUM 75 MG/1
75 TABLET, DELAYED RELEASE ORAL 2 TIMES DAILY WITH MEALS
Qty: 28 TABLET | Refills: 0 | Status: ON HOLD | OUTPATIENT
Start: 2020-06-02 | End: 2020-08-21

## 2020-06-10 ENCOUNTER — OFFICE VISIT (OUTPATIENT)
Dept: NEUROSURGERY | Age: 60
End: 2020-06-10
Payer: COMMERCIAL

## 2020-06-10 VITALS
HEIGHT: 68 IN | DIASTOLIC BLOOD PRESSURE: 82 MMHG | TEMPERATURE: 98.4 F | HEART RATE: 62 BPM | WEIGHT: 190 LBS | BODY MASS INDEX: 28.79 KG/M2 | OXYGEN SATURATION: 96 % | SYSTOLIC BLOOD PRESSURE: 132 MMHG

## 2020-06-10 PROCEDURE — 3017F COLORECTAL CA SCREEN DOC REV: CPT | Performed by: NEUROLOGICAL SURGERY

## 2020-06-10 PROCEDURE — 99213 OFFICE O/P EST LOW 20 MIN: CPT | Performed by: NEUROLOGICAL SURGERY

## 2020-06-10 PROCEDURE — 1036F TOBACCO NON-USER: CPT | Performed by: NEUROLOGICAL SURGERY

## 2020-06-10 PROCEDURE — G8427 DOCREV CUR MEDS BY ELIG CLIN: HCPCS | Performed by: NEUROLOGICAL SURGERY

## 2020-06-10 PROCEDURE — G8417 CALC BMI ABV UP PARAM F/U: HCPCS | Performed by: NEUROLOGICAL SURGERY

## 2020-06-10 NOTE — PROGRESS NOTES
[address] [facility]  [facility] [address]  [address]  Dept: [phone]    Patient:  Julio Patel  YOB: 1960  Date: 6/10/20    The patient is a 64 y.o. male who presents today for consult of the following problems:     Chief Complaint   Patient presents with    Follow-up     6 week Pain from implanted hardware, initial encounter     Results     MRI Lumbar 05/15/20             HPI:     Julio Patel is a 64 y.o. male on whom neurosurgical consultation was requested by PAUL Díaz CNP for management of significant bilateral paraspinal axial pain achy in nature localized with intermittent radiation down bilateral lower extremities S1 distribution of the popliteal fossa. Pain significantly worsened upon extension and bending forward at times. Able to ambulate with no significant provocation with exception of 20 to 30 feet. No distinct numbness tingling that is associated in the lower extremities distally. Palliating factors have included the prednisone taper as well as remotely some therapy as well. Has had remote laminectomies with injections but nothing recently. No significant saddle anesthesia bowel bladder incontinence present.       History:     Past Medical History:   Diagnosis Date    Chronic back pain     Headache(784.0)     Kidney stones     Osteoarthritis     Type II or unspecified type diabetes mellitus without mention of complication, not stated as uncontrolled     diet controlled     Past Surgical History:   Procedure Laterality Date    COLONOSCOPY  2012    LITHOTRIPSY  08/2018    OTHER SURGICAL HISTORY      spinal stimulator placement 6-7 years ago   [address] [facility]  01/30/2020    removal    SPINAL CORD STIMULATOR SURGERY N/A 1/30/2020    SPINAL CORD STIMULATOR IMPLANT REMOVAL, performed by Jhonny Phelps DO at [place]. [address]      laminectomy X2 and Spinal Cord stimulartor implant    URETER STENT PLACEMENT       Family History   Problem Relation Age of Onset    Diabetes Mother     High Blood Pressure Mother     Diabetes Father     Glaucoma Father     Eczema Father      Current Outpatient Medications on File Prior to Visit   Medication Sig Dispense Refill    diclofenac (VOLTAREN) 75 MG EC tablet Take 1 tablet by mouth 2 times daily (with meals) for 14 days 28 tablet 0    pregabalin (LYRICA) 150 MG capsule TAKE ONE CAPSULE BY MOUTH TWICE A DAY (Patient taking differently: Take 150 mg by mouth nightly. Patient states that he currently takes only at night) 60 capsule 2    ondansetron (ZOFRAN) 4 MG tablet TAKE ONE TABLET BY MOUTH EVERY 8 HOURS AS NEEDED FOR NAUSEA 60 tablet 0    ibuprofen (ADVIL;MOTRIN) 800 MG tablet Take 1 tablet by mouth every 8 hours as needed for Pain 270 tablet 0     No current facility-administered medications on file prior to visit. Social History     Tobacco Use    Smoking status: Never Smoker    Smokeless tobacco: Never Used   Substance Use Topics    Alcohol use: Yes     Comment: rarly    Drug use: No       Allergies   Allergen Reactions    Morphine Other (See Comments)     Patient developed suicidal ideations    Fentanyl Hives       Review of Systems  Constitutional: Negative for activity change and appetite change. HENT: Negative for ear pain and facial swelling. Eyes: Negative for discharge and itching. Respiratory: Negative for choking and chest tightness. Cardiovascular: Negative for chest pain and leg swelling. Gastrointestinal: Negative for nausea and abdominal pain. Endocrine: Negative for cold intolerance and heat intolerance. Genitourinary: Negative for frequency and flank pain. Musculoskeletal: Negative for myalgias and joint swelling. Skin: Negative for rash and wound. Allergic/Immunologic: Negative for environmental allergies and food allergies. Hematological: Negative for adenopathy.  Does not bruise/bleed

## 2020-06-16 ENCOUNTER — HOSPITAL ENCOUNTER (OUTPATIENT)
Dept: PHYSICAL THERAPY | Age: 60
Setting detail: THERAPIES SERIES
Discharge: HOME | End: 2020-06-16
Payer: COMMERCIAL

## 2020-06-16 PROCEDURE — 97161 PT EVAL LOW COMPLEX 20 MIN: CPT

## 2020-06-16 PROCEDURE — 97110 THERAPEUTIC EXERCISES: CPT

## 2020-06-16 ASSESSMENT — PAIN DESCRIPTION - ORIENTATION
ORIENTATION_2: RIGHT
ORIENTATION: RIGHT;LEFT

## 2020-06-16 ASSESSMENT — PAIN DESCRIPTION - FREQUENCY: FREQUENCY: INTERMITTENT

## 2020-06-16 ASSESSMENT — PAIN DESCRIPTION - DURATION: DURATION_2: INTERMITTENT

## 2020-06-16 ASSESSMENT — PAIN DESCRIPTION - LOCATION
LOCATION_2: SHOULDER
LOCATION: BACK;LEG

## 2020-06-16 ASSESSMENT — PAIN SCALES - GENERAL: PAINLEVEL_OUTOF10: 5

## 2020-06-16 ASSESSMENT — PAIN DESCRIPTION - INTENSITY: RATING_2: 9

## 2020-06-16 NOTE — PROGRESS NOTES
Score  UEFS Score: 67.5 (06/16/20 1444)     Goals  Short term goals  Time Frame for Short term goals: 6 visits  Short term goal 1: decrease pain on low back and R sarah by 50% so patient can do ADL easier  Short term goal 2: increase AROM R sarah and trunk to full  Short term goal 3: increase strength R scap muscles by 1/2 grade or better  Short term goal 4: indep with HEP  Long term goals  Time Frame for Long term goals : 12 visits  Long term goal 1: improve UEFS from 76 to 66 or better  Patient Goals   Patient goals : no pain     Treatment Charges: Minutes Units   []  Ultrasound     []  Electrical-Stim     []  Iontophoresis     []  Traction     []  Massage       [x]  Eval 20 1   []  Gait     [x]  Ther Exercise 30  2    []  Manual Therapy       []  Ther Activities       []  Aquatics     []  Vasopneumatic Device     []  Neuro Re-Ed       []  Other       Total Treatment Time: 50 3        Therapy Time   Individual Concurrent Group Co-treatment   Time In 1444         Time Out 1534         Minutes 50         Timed Code Treatment Minutes: 30 Minutes     Patient Goals:no pain    Comments/Assessment:    Rehab Potential:  [x] Good  [] Fair  [] Poor   Suggested Professional Referral:  [x] No  [] Yes:  Barriers to Goal Achievement:  [] No  [x] Yes:chronic  Domestic Concerns:  [x] No  [] Yes:    Treatment Plan:  [x] Therapeutic Exercise   98298  [] Iontophoresis: 4 mg/mL Dexamethasone Sodium Phosphate  mAmin  25271   [] Therapeutic Activity  54308 [] Vasopneumatic cold with compression  91108    [] Gait Training   55957 [] Ultrasound   [id number]   [] Neuromuscular Re-education  57774 [x] Electrical Stimulation Unattended  79220 PRN   [] Manual Therapy  11965 [] Electrical Stimulation Attended  92641   [x] Instruction in HEP  [] Lumbar/Cervical Traction  98944   [] Aquatic Therapy   27337 [] Cold/hotpack    [] Massage   14729      [] Dry Needling, 1 or 2 muscles  86329   [] Biofeedback, first 15 minutes   93789  [] Biofeedback,

## 2020-06-19 ENCOUNTER — HOSPITAL ENCOUNTER (OUTPATIENT)
Dept: PHYSICAL THERAPY | Age: 60
Setting detail: THERAPIES SERIES
Discharge: HOME | End: 2020-06-19
Payer: COMMERCIAL

## 2020-06-19 PROCEDURE — 97110 THERAPEUTIC EXERCISES: CPT

## 2020-06-19 ASSESSMENT — PAIN DESCRIPTION - LOCATION
LOCATION_2: SHOULDER
LOCATION: BACK

## 2020-06-19 ASSESSMENT — PAIN DESCRIPTION - ORIENTATION
ORIENTATION_2: RIGHT
ORIENTATION: LOWER

## 2020-06-19 ASSESSMENT — PAIN SCALES - GENERAL: PAINLEVEL_OUTOF10: 5

## 2020-06-19 ASSESSMENT — PAIN DESCRIPTION - DESCRIPTORS: DESCRIPTORS: CONSTANT

## 2020-06-19 ASSESSMENT — PAIN DESCRIPTION - DURATION: DURATION_2: INTERMITTENT

## 2020-06-19 ASSESSMENT — PAIN DESCRIPTION - INTENSITY: RATING_2: 5

## 2020-06-19 ASSESSMENT — PAIN DESCRIPTION - FREQUENCY: FREQUENCY: CONTINUOUS

## 2020-06-19 NOTE — PROGRESS NOTES
Physical Therapy  Daily Treatment Note  Date: 2020  Patient Name: Jose R Lainez  MRN: [id number]     :   1960    Subjective:      PT Visit Information  Onset Date: 18  PT Insurance Information: cigna/MMO  Total # of Visits Approved: 12  Total # of Visits to Date: 2  Subjective  Subjective: low back pain radiating to both legs,R sarah pain  Pain Screening  Patient Currently in Pain: Yes  Pain Assessment  Pain Assessment: 0-10  Pain Level: 5  Pain Location: Back  Pain Orientation: Lower  Pain Descriptors: Constant  Pain Frequency: Continuous  Pain 2  Pain Rating 2: 5  Pain Location 2: Shoulder  Pain Orientation 2: Right  Pain Duration 2: Intermittent  Vital Signs  Patient Currently in Pain: Yes       Treatment Activities:   Exercises  Exercise 1: alt arm/leg lift 10x10\"  Exercise 2: B SLR 10x10\"  Exercise 3: bridging 10x10\"  Exercise 4: prone 2 pillow on chest hip ext 10x10\"  Exercise 5: B isometric hip flex 10x10\" hold  Exercise 6: saad's R 4 way 0# 10x  Exercise 7: R sarah ER in L S/L 2# 3x10  Exercise 8: supine R sarah protraction 5# 3x10  Exercise 9: sarah R flex/ext/scaption 2# 15x  Exercise 10: Lime Tband BUE rows/pull down 3x10  Exercise 11: Lime Tband 4 way hip BLE 10x  Exercise 12: 6 way R sarah Lime Tband 1 strand 10x     Assessment:   Conditions Requiring Skilled Therapeutic Intervention  Assessment: progress with ex  REQUIRES PT FOLLOW UP: Yes  Discharge Recommendations: Home independently     Plan:    Plan  Times per week: 2x/week  Current Treatment Recommendations: Strengthening, ROM, Home Exercise Program, Modalities  Plan Comment: to continue PT per POC  Timed Code Treatment Minutes: 30 Minutes   Treatment Charges: Minutes Units   []  Ultrasound     []  Electrical-Stim     []  Iontophoresis     []  Traction     []  Massage       []  Eval     []  Gait     [x]  Ther Exercise 30  2    []  Manual Therapy       []  Ther Activities       []  Aquatics     []  Vasopneumatic Device     []  Neuro Re-Ed     []  Other       Total Treatment Time: 30 2        Therapy Time   Individual Concurrent Group Co-treatment   Time In 1420         Time Out 1450         Minutes 30         Timed Code Treatment Minutes: 30 Minutes    Electronically signed by: Jakub Storey PT

## 2020-06-23 ENCOUNTER — HOSPITAL ENCOUNTER (OUTPATIENT)
Dept: PHYSICAL THERAPY | Age: 60
Setting detail: THERAPIES SERIES
Discharge: HOME | End: 2020-06-23
Payer: COMMERCIAL

## 2020-06-23 PROCEDURE — 97110 THERAPEUTIC EXERCISES: CPT

## 2020-06-23 ASSESSMENT — PAIN DESCRIPTION - FREQUENCY: FREQUENCY: CONTINUOUS

## 2020-06-23 ASSESSMENT — PAIN DESCRIPTION - ORIENTATION: ORIENTATION: LOWER

## 2020-06-23 ASSESSMENT — PAIN SCALES - GENERAL: PAINLEVEL_OUTOF10: 6

## 2020-06-23 ASSESSMENT — PAIN DESCRIPTION - LOCATION: LOCATION: BACK

## 2020-06-23 ASSESSMENT — PAIN DESCRIPTION - DESCRIPTORS: DESCRIPTORS: CONSTANT

## 2020-06-23 NOTE — PROGRESS NOTES
Treatment Time: 45 3        Therapy Time   Individual Concurrent Group Co-treatment   Time In 1420         Time Out 1505         Minutes 45         Timed Code Treatment Minutes: 39 Minutes     Electronically signed by: Theodore Oates PT

## 2020-06-25 ENCOUNTER — HOSPITAL ENCOUNTER (OUTPATIENT)
Dept: PHYSICAL THERAPY | Age: 60
Setting detail: THERAPIES SERIES
Discharge: HOME | End: 2020-06-25
Payer: COMMERCIAL

## 2020-06-25 PROCEDURE — 97110 THERAPEUTIC EXERCISES: CPT

## 2020-06-25 ASSESSMENT — PAIN DESCRIPTION - INTENSITY: RATING_2: 5

## 2020-06-25 ASSESSMENT — PAIN DESCRIPTION - DURATION: DURATION_2: INTERMITTENT

## 2020-06-25 ASSESSMENT — PAIN SCALES - GENERAL: PAINLEVEL_OUTOF10: 6

## 2020-06-25 ASSESSMENT — PAIN DESCRIPTION - DESCRIPTORS: DESCRIPTORS: CONSTANT

## 2020-06-25 ASSESSMENT — PAIN DESCRIPTION - ORIENTATION
ORIENTATION: LOWER
ORIENTATION_2: RIGHT

## 2020-06-25 ASSESSMENT — PAIN DESCRIPTION - FREQUENCY: FREQUENCY: CONTINUOUS

## 2020-06-25 ASSESSMENT — PAIN DESCRIPTION - LOCATION
LOCATION: BACK
LOCATION_2: SHOULDER

## 2020-06-25 NOTE — PROGRESS NOTES
well    Plan:  Plan: Continue with current plan    Therapy Time:   Time In:220pm  Time Out:310pm    Treatment Charges: Minutes Units   []  Ultrasound     []  Electrical-Stim     []  Iontophoresis     []  Traction     []  Massage       []  Eval     []  Gait     []  Vasopneumatic Device     [x]  Ther Exercise 50 3   []  Manual Therapy       []  Ther Activities       []  Aquatics     []  Neuro Re-Ed       []  Other       Total Treatment Time: 48 3       Rodell Heimlich, NITIN

## 2020-07-06 ENCOUNTER — HOSPITAL ENCOUNTER (OUTPATIENT)
Dept: PHYSICAL THERAPY | Age: 60
Setting detail: THERAPIES SERIES
Discharge: HOME | End: 2020-07-06
Payer: COMMERCIAL

## 2020-07-06 PROCEDURE — 97110 THERAPEUTIC EXERCISES: CPT

## 2020-07-06 ASSESSMENT — PAIN DESCRIPTION - LOCATION: LOCATION: BACK

## 2020-07-06 ASSESSMENT — PAIN DESCRIPTION - ORIENTATION: ORIENTATION: LOWER

## 2020-07-06 ASSESSMENT — PAIN SCALES - GENERAL: PAINLEVEL_OUTOF10: 8

## 2020-07-06 ASSESSMENT — PAIN DESCRIPTION - FREQUENCY: FREQUENCY: INTERMITTENT

## 2020-07-06 NOTE — PROGRESS NOTES
Physical Therapy Re-evaluation Note    Date: 2020  Patient Name: Julia Miller  MRN: [id number]  : 1960     Treatment Diagnosis: stiffness R sarah M25.611 weakness R sarah M62.511    Subjective   General  Referring Practitioner: Dr Linh Liriano/Dr Abhijeet Summers   Referral Date : 20  Diagnosis: tear R rotator cuff M75.101 lumbar spondylosis with radiculopathy  Follows Commands: Within Functional Limits  PT Visit Information  Onset Date: 18  PT Insurance Information: cigna/MMO  Total # of Visits Approved: 12  Total # of Visits to Date: 5  Subjective  Subjective: complains of low back pain today  Pain Screening  Patient Currently in Pain: Yes  Pain Assessment  Pain Assessment: 0-10  Pain Level: 8  Pain Location: Back  Pain Orientation: Lower  Pain Frequency: Intermittent  Vital Signs  Patient Currently in Pain: Yes    Objective  AROM RUE (degrees)  R Shoulder Flexion 0-180: 0-145  R Shoulder ABduction 0-180: 0-135  R Shoulder Int Rotation  0-70: 0-60  R Shoulder Ext Rotation 0-90: 0-90  Spine  Lumbar: AROM TRUNK FLEX 70 EXT 30 ROTB FREE SBB 40  Strength RUE  Comment: sarah 4/5 scap 3-/5 distal 5/5     Exercises  Exercise 1: alt arm/leg lift 10x10\"  Exercise 2: B SLR 10x10\"  Exercise 3: bridging 10x10\"  Exercise 4: prone 2 pillow on chest hip ext 10x10\"  Exercise 5: B isometric hip flex 10x10\" hold  Exercise 6: saad's R 4 way 0# 10x  Exercise 7: R sarah ER in L S/L 2# 3x10  Exercise 8: supine R sarah protraction 5# 3x10  Exercise 9: sarah R flex/ext/scaption 2# 15x  Exercise 10: prone R sarah lawnmower pull 5# 3x10  Exercise 11: prone R sarah ext 5# 3x10  Exercise 12: 35# rows/pull down 3x10  Exercise 13: walking free motion machine 13# 4 way 5x  Exercise 14: 4 way hip BLE free motion machine 7# 10x    Assessment   Conditions Requiring Skilled Therapeutic Intervention  Assessment: SHORT TERM GOALS MET 2/4 LONG TERM GOAL MET 0/1  Treatment Diagnosis: stiffness R sarah M25.611 weakness R sarah M62.511  REQUIRES PT FOLLOW UP: Yes  Discharge Recommendations: Home independently  Activity Tolerance  Activity Tolerance: Patient Tolerated treatment well     Plan   Plan  Current Treatment Recommendations: Strengthening, ROM, Home Exercise Program, Modalities  Plan Comment: TO FINISH LAST 7 PT VISITS THEN DISCONTINUE PT WITH PATIENT TO CONTINUE HEP    OutComes Score  UEFS Score: 66.25 (07/06/20 1420)    Goals  Short term goals  Short term goal 1: decrease pain on low back and R sarah by 50% so patient can do ADL easier(not met)  Short term goal 2: increase AROM R sarah and trunk to full(partly met)  Short term goal 3: increase strength R scap muscles by 1/2 grade or better(met)  Short term goal 4: indep with HEP(met)  Long term goals  Long term goal 1: improve UEFS from 76 to 66 or better(not met UEFS 66)     Treatment Charges: Minutes Units   []  Ultrasound     []  Electrical-Stim     []  Iontophoresis     []  Traction     []  Massage       []  Eval     []  Gait     [x]  Ther Exercise 45  3    []  Manual Therapy       []  Ther Activities       []  Aquatics     []  Vasopneumatic Device     []  Neuro Re-Ed       []  Other       Total Treatment Time: 45 3        Therapy Time   Individual Concurrent Group Co-treatment   Time In 1420         Time Out 1505         Minutes 45         Timed Code Treatment Minutes: 39 Minutes     Electronically signed by: Destiny Manning PT

## 2020-07-09 ENCOUNTER — HOSPITAL ENCOUNTER (OUTPATIENT)
Dept: PHYSICAL THERAPY | Age: 60
Setting detail: THERAPIES SERIES
Discharge: HOME | End: 2020-07-09
Payer: COMMERCIAL

## 2020-07-09 PROCEDURE — 97110 THERAPEUTIC EXERCISES: CPT

## 2020-07-09 ASSESSMENT — PAIN DESCRIPTION - DURATION: DURATION_2: INTERMITTENT

## 2020-07-09 ASSESSMENT — PAIN DESCRIPTION - LOCATION
LOCATION: BACK
LOCATION_2: SHOULDER

## 2020-07-09 ASSESSMENT — PAIN DESCRIPTION - ORIENTATION
ORIENTATION_2: RIGHT
ORIENTATION: LOWER

## 2020-07-09 ASSESSMENT — PAIN DESCRIPTION - INTENSITY: RATING_2: 6

## 2020-07-09 ASSESSMENT — PAIN DESCRIPTION - FREQUENCY: FREQUENCY: CONTINUOUS

## 2020-07-09 ASSESSMENT — PAIN SCALES - GENERAL: PAINLEVEL_OUTOF10: 8

## 2020-07-09 ASSESSMENT — PAIN DESCRIPTION - DESCRIPTORS: DESCRIPTORS: CONSTANT

## 2020-07-09 NOTE — PROGRESS NOTES
[address]   Outpatient Physical Therapy  [address] [facility]. [address]  Phone: [phone]  Fax: [phone]  Daily Progress Note    Date: 20    Patient Name: Vana Simmonds        MRN: [id number]  Account: [de-identified] : 1960      General Information:  Referring Practitioner: Dr Annel Liriano/Dr Marcus Young   Referral Date : 20  Diagnosis: tear R rotator cuff M75.101 lumbar spondylosis with radiculopathy  Follows Commands: Within Functional Limits  Onset Date: 18  PT Insurance Information: cigna/MMO  Total # of Visits Approved: 12  Total # of Visits to Date: 6    Subjective:  Subjective: Patient states he has a rough day at work,      Pain:  Patient Currently in Pain: Yes  Pain Assessment: 0-10  Pain Level: 8  Pain Location: Back  Pain Orientation: Lower  Pain Descriptors: Constant  Pain Frequency: Continuous  Pain Rating 2: 6  Pain Location 2: Shoulder  Pain Orientation 2: Right  Pain Duration 2: Intermittent    Objective:  Exercise 1: alt arm/leg lift 10x10\"  Exercise 2: B SLR 10x10\"  Exercise 3: bridging 10x10\"  Exercise 4: prone 2 pillow on chest hip ext 10x10\"  Exercise 5: B isometric hip flex 10x10\" hold  Exercise 6: saad's R 4 way 0# 10x  Exercise 7: R sarah ER in L S/L 2# 3x10  Exercise 8: supine R sarah protraction 5# 3x10  Exercise 9: sarah R flex/ext/scaption 2# 15x  Exercise 10: prone R sarah lawnmower pull 5# 3x10  Exercise 11: prone R sarah ext 5# 3x10  Exercise 12: 35# rows/pull down 3x10  Exercise 13: walking free motion machine 13# 4 way 5x  Exercise 14: 4 way hip BLE free motion machine 7# 10x    Assessment: Body structures, Functions, Activity limitations: Decreased functional mobility ; Decreased ADL status; Decreased ROM; Decreased strength; Increased pain  Assessment: contiued with most recent exercises, progress as tolerated next session  Treatment Diagnosis: stiffness R sarah M25.611 weakness R sarah M62.511  REQUIRES PT FOLLOW UP: Yes  Discharge Recommendations: Home independently  Activity Tolerance: Patient limited by pain    Plan:  Plan: Continue with current plan    Therapy Time:  Time In: 1407  Time Out: 1451  Minutes: 44    Treatment Charges: Minutes Units   []  Ultrasound     []  Electrical-Stim     []  Iontophoresis     []  Traction     []  Massage       []  Eval     []  Gait     []  Vasopneumatic Device     [x]  Ther Exercise 44  3   []  Manual Therapy       []  Ther Activities       []  Aquatics     []  Neuro Re-Ed       []  Other       Total Treatment Time: 40 3       Luther Conrad PTA

## 2020-07-13 ENCOUNTER — HOSPITAL ENCOUNTER (OUTPATIENT)
Dept: PHYSICAL THERAPY | Age: 60
Setting detail: THERAPIES SERIES
Discharge: HOME | End: 2020-07-13
Payer: COMMERCIAL

## 2020-07-13 PROCEDURE — 97110 THERAPEUTIC EXERCISES: CPT

## 2020-07-13 ASSESSMENT — PAIN DESCRIPTION - FREQUENCY: FREQUENCY: CONTINUOUS

## 2020-07-13 ASSESSMENT — PAIN SCALES - GENERAL: PAINLEVEL_OUTOF10: 7

## 2020-07-13 ASSESSMENT — PAIN DESCRIPTION - INTENSITY: RATING_2: 5

## 2020-07-13 ASSESSMENT — PAIN DESCRIPTION - ORIENTATION
ORIENTATION_2: RIGHT
ORIENTATION: LOWER

## 2020-07-13 ASSESSMENT — PAIN DESCRIPTION - DURATION: DURATION_2: INTERMITTENT

## 2020-07-13 ASSESSMENT — PAIN DESCRIPTION - LOCATION
LOCATION: BACK
LOCATION_2: SHOULDER

## 2020-07-13 ASSESSMENT — PAIN DESCRIPTION - DESCRIPTORS: DESCRIPTORS: CONSTANT

## 2020-07-13 NOTE — PROGRESS NOTES
[address]   Outpatient Physical Therapy  [address] [facility]. [address]  Phone: [phone]  Fax: [phone]  Daily Progress Note    Date: 20    Patient Name: Lauren Geronimo        MRN: [id number]  Account: [de-identified] : 1960      General Information:  Referring Practitioner: Dr Raghavendra Liriano/Dr Michael Chávez   Referral Date : 20  Diagnosis: tear R rotator cuff M75.101 lumbar spondylosis with radiculopathy  Follows Commands: Within Functional Limits  Onset Date: 18  PT Insurance Information: cigna/MMO  Total # of Visits Approved: 12  Total # of Visits to Date: 7    Subjective:  Subjective: Patient has no new complaints this date. Pain:  Patient Currently in Pain: Yes  Pain Assessment: 0-10  Pain Level: 7  Pain Location: Back  Pain Orientation: Lower  Pain Descriptors: Constant  Pain Frequency: Continuous  Pain Rating 2: 5  Pain Location 2: Shoulder  Pain Orientation 2: Right  Pain Duration 2: Intermittent    Objective:  Exercise 1: alt arm/leg lift 10x10\"  Exercise 2: B SLR 10x10\"  Exercise 3: bridging 10x10\"  Exercise 4: prone 2 pillow on chest hip ext 10x10\"  Exercise 5: B isometric hip flex 10x10\" hold  Exercise 6: saad's R 4 way 0# 10x3  Exercise 7: R sarah ER in L S/L 2# 3x10  Exercise 8: supine R sarah protraction 5# 3x10  Exercise 9: sarah R flex/ext/scaption 2# 15x2  Exercise 10: prone R sarah lawnmower pull 5# 3x10  Exercise 11: prone R sarah ext 5# 3x10  Exercise 12: 35# rows/pull down 3x10  Exercise 13: walking free motion machine 13# 4 way 5x  Exercise 14: 4 way hip BLE free motion machine 7# 10x    Assessment: Body structures, Functions, Activity limitations: Decreased functional mobility ; Decreased ADL status; Decreased ROM; Decreased strength; Increased pain  Assessment: contiued with most recent exercises, progress as tolerated next session  Treatment Diagnosis: stiffness R sarah M25.611 weakness R sarah M62.511  REQUIRES PT FOLLOW UP: Yes  Discharge Recommendations: Home independently  Activity Tolerance: Patient limited by pain    Plan:  Plan: Continue with current plan    Therapy Time:  Time In:215pm  Time Out:300pm    Treatment Charges: Minutes Units   []  Ultrasound     []  Electrical-Stim     []  Iontophoresis     []  Traction     []  Massage       []  Eval     []  Gait     []  Vasopneumatic Device     [x]  Ther Exercise 45  3   []  Manual Therapy       []  Ther Activities       []  Aquatics     []  Neuro Re-Ed       []  Other       Total Treatment Time: 39 3       Joaquín Monreal, NITIN

## 2020-07-16 ENCOUNTER — HOSPITAL ENCOUNTER (OUTPATIENT)
Dept: PHYSICAL THERAPY | Age: 60
Setting detail: THERAPIES SERIES
Discharge: HOME | End: 2020-07-16
Payer: COMMERCIAL

## 2020-07-16 ENCOUNTER — INITIAL CONSULT (OUTPATIENT)
Dept: PAIN MANAGEMENT | Age: 60
End: 2020-07-16
Payer: COMMERCIAL

## 2020-07-16 VITALS
BODY MASS INDEX: 29.52 KG/M2 | HEART RATE: 64 BPM | WEIGHT: 194.8 LBS | TEMPERATURE: 97.5 F | HEIGHT: 68 IN | SYSTOLIC BLOOD PRESSURE: 129 MMHG | DIASTOLIC BLOOD PRESSURE: 82 MMHG

## 2020-07-16 PROCEDURE — 97110 THERAPEUTIC EXERCISES: CPT

## 2020-07-16 PROCEDURE — 99204 OFFICE O/P NEW MOD 45 MIN: CPT | Performed by: PAIN MEDICINE

## 2020-07-16 ASSESSMENT — PAIN DESCRIPTION - FREQUENCY: FREQUENCY: CONTINUOUS

## 2020-07-16 ASSESSMENT — PAIN DESCRIPTION - LOCATION
LOCATION_2: SHOULDER
LOCATION: BACK

## 2020-07-16 ASSESSMENT — ENCOUNTER SYMPTOMS
ABDOMINAL PAIN: 0
EYES NEGATIVE: 1
BACK PAIN: 1
ALLERGIC/IMMUNOLOGIC NEGATIVE: 1
RESPIRATORY NEGATIVE: 1
BOWEL INCONTINENCE: 0

## 2020-07-16 ASSESSMENT — PAIN DESCRIPTION - DURATION: DURATION_2: INTERMITTENT

## 2020-07-16 ASSESSMENT — PAIN DESCRIPTION - ORIENTATION
ORIENTATION_2: RIGHT
ORIENTATION: LOWER

## 2020-07-16 ASSESSMENT — PAIN DESCRIPTION - INTENSITY: RATING_2: 5

## 2020-07-16 ASSESSMENT — PAIN DESCRIPTION - DESCRIPTORS: DESCRIPTORS: CONSTANT

## 2020-07-16 ASSESSMENT — PAIN SCALES - GENERAL: PAINLEVEL_OUTOF10: 10

## 2020-07-16 NOTE — PROGRESS NOTES
[address]   Outpatient Physical Therapy  [address] [facility]. [address]  Phone: [phone]  Fax: [phone]  Daily Progress Note    Date: 20    Patient Name: Jas Norris        MRN: [id number]  Account: [de-identified] : 1960      General Information:  Referring Practitioner: Dr Greta Liriano/Dr Mendy Dalal   Referral Date : 20  Diagnosis: tear R rotator cuff M75.101 lumbar spondylosis with radiculopathy  Follows Commands: Within Functional Limits  Onset Date: 18  PT Insurance Information: cigna/MMO  Total # of Visits Approved: 12  Total # of Visits to Date: 8    Subjective:  Subjective: Patient reports he has another doctors appointment today to get \"nerve endings in back burned\" in order to help with his pain, requests to  only complete shoulder there ex this date     Pain:  Patient Currently in Pain: Yes  Pain Assessment: 0-10  Pain Level: 10  Pain Location: Back  Pain Orientation: Lower  Pain Descriptors: Constant  Pain Frequency: Continuous  Pain Rating 2: 5  Pain Location 2: Shoulder  Pain Orientation 2: Right  Pain Duration 2: Intermittent    Objective:  Exercise 6: saad's R 4 way 0# 10x3  Exercise 7: R sarah ER in L S/L 2# 3x10  Exercise 8: supine R sarah protraction 5# 3x10  Exercise 9: sarah R flex/ext/scaption 2# 15x2  Exercise 10: prone R sarah lawnmower pull 5# 3x10  Exercise 11: prone R sarah ext 5# 3x10  Exercise 12: 35# rows/pull down 3x10    Assessment: Body structures, Functions, Activity limitations: Decreased functional mobility ; Decreased ADL status; Decreased ROM; Decreased strength; Increased pain  Assessment: continued with shoulder exercises only at this time per patient request.  Treatment Diagnosis: stiffness R sarah M25.611 weakness R sarah M62.511  REQUIRES PT FOLLOW UP: Yes  Discharge Recommendations: Home independently  Activity Tolerance: Patient limited by pain    Plan:  Plan: Continue with current plan    Therapy Time:  Time In: 0800  Time Out: 6279  Minutes: 35    Treatment Charges: Minutes Units   []  Ultrasound     []  Electrical-Stim     []  Iontophoresis     []  Traction     []  Massage       []  Eval     []  Gait     []  Vasopneumatic Device     [x]  Ther Exercise 35  2   []  Manual Therapy       []  Ther Activities       []  Aquatics     []  Neuro Re-Ed       []  Other       Total Treatment Time: 28 2       Frank Alba, PTA

## 2020-07-16 NOTE — PROGRESS NOTES
HPI:     Back Pain   This is a chronic problem. The current episode started more than 1 year ago. The problem occurs 2 to 4 times per day. The problem has been gradually worsening since onset. The pain is present in the lumbar spine and sacro-iliac. The quality of the pain is described as aching, burning, cramping, shooting and stabbing. The pain radiates to the left foot, left knee, left thigh, right foot, right knee and right thigh. The pain is at a severity of 8/10. The pain is severe. The pain is the same all the time. The symptoms are aggravated by bending and standing. Associated symptoms include leg pain, numbness and tingling. Pertinent negatives include no abdominal pain, bowel incontinence, chest pain, fever or headaches. He has tried chiropractic manipulation, ice, heat, home exercises, walking, muscle relaxant, analgesics and NSAIDs for the symptoms. The treatment provided no relief. Pain in the low back. MRI with multilevel degenerative changes. He has seen a surgeon who suggested more conservative measures. Has been doing physical therapy. NSAIDs without benefit. History of spinal cord stimulator placement with subsequent removal.  Taking Norco and Lyrica from his primary care physician with some benefit. Patient denies any new neurological symptoms. No bowel or bladder incontinence, no weakness, and no falling. Review of OARRS does not show any aberrant prescription behavior. Medication is helping the patient stay active. Patient denies any side effects and reports adequate analgesia. No sign of misuse/abuse.     Past Medical History:   Diagnosis Date    Chronic back pain     Headache(784.0)     Kidney stones     Osteoarthritis     Type II or unspecified type diabetes mellitus without mention of complication, not stated as uncontrolled     diet controlled       Past Surgical History:   Procedure Laterality Date    BACK SURGERY      COLONOSCOPY  2012    LITHOTRIPSY  08/2018   Radha Allred OTHER SURGICAL HISTORY      spinal stimulator placement 6-7 years ago    SPINAL CORD STIMULATOR SURGERY  01/30/2020    removal    SPINAL CORD STIMULATOR SURGERY N/A 1/30/2020    SPINAL CORD STIMULATOR IMPLANT REMOVAL, performed by Hieu Salgado DO at [address]      laminectomy X2 and Spinal Cord stimulartor implant    URETER STENT PLACEMENT         Allergies   Allergen Reactions    Morphine Other (See Comments)     Patient developed suicidal ideations    Fentanyl Hives         Current Outpatient Medications:     ondansetron (ZOFRAN) 4 MG tablet, TAKE ONE TABLET BY MOUTH EVERY 8 HOURS AS NEEDED FOR NAUSEA, Disp: 90 tablet, Rfl: 0    HYDROcodone-acetaminophen (NORCO) 5-325 MG per tablet, Take 1 tablet by mouth every 4 hours as needed for Pain for up to 30 days. , Disp: 30 tablet, Rfl: 0    diclofenac (VOLTAREN) 75 MG EC tablet, Take 1 tablet by mouth 2 times daily (with meals) for 14 days, Disp: 28 tablet, Rfl: 0    pregabalin (LYRICA) 150 MG capsule, TAKE ONE CAPSULE BY MOUTH TWICE A DAY (Patient taking differently: Take 150 mg by mouth nightly.  Patient states that he currently takes only at night), Disp: 60 capsule, Rfl: 2    ibuprofen (ADVIL;MOTRIN) 800 MG tablet, Take 1 tablet by mouth every 8 hours as needed for Pain, Disp: 270 tablet, Rfl: 0    Family History   Problem Relation Age of Onset    Diabetes Mother     High Blood Pressure Mother     Diabetes Father     Glaucoma Father     Eczema Father        Social History     Socioeconomic History    Marital status:      Spouse name: Not on file    Number of children: Not on file    Years of education: Not on file    Highest education level: Not on file   Occupational History    Not on file   Social Needs    Financial resource strain: Not on file    Food insecurity     Worry: Not on file     Inability: Not on file    Transportation needs     Medical: Not on file     Non-medical: Not on file   Tobacco Use    Smoking status: Never Smoker    Smokeless tobacco: Never Used   Substance and Sexual Activity    Alcohol use: Yes     Comment: rarly    Drug use: No    Sexual activity: Not on file   Lifestyle    Physical activity     Days per week: Not on file     Minutes per session: Not on file    Stress: Not on file   Relationships    Social connections     Talks on phone: Not on file     Gets together: Not on file     Attends Gnosticist service: Not on file     Active member of club or organization: Not on file     Attends meetings of clubs or organizations: Not on file     Relationship status: Not on file    Intimate partner violence     Fear of current or ex partner: Not on file     Emotionally abused: Not on file     Physically abused: Not on file     Forced sexual activity: Not on file   Other Topics Concern    Not on file   Social History Narrative    Not on file       Review of Systems:  Review of Systems   Constitution: Negative for fever. HENT: Negative. Eyes: Negative. Cardiovascular: Negative for chest pain. Respiratory: Negative. Endocrine: Positive for cold intolerance. Hematologic/Lymphatic: Negative. Skin: Negative. Musculoskeletal: Positive for back pain. Gastrointestinal: Negative for abdominal pain and bowel incontinence. Genitourinary: Negative. Neurological: Positive for numbness and tingling. Negative for headaches. Psychiatric/Behavioral: Negative. Allergic/Immunologic: Negative. Physical Exam:  /82   Pulse 64   Temp 97.5 °F (36.4 °C)   Ht 5' 8\" (1.727 m)   Wt 194 lb 12.8 oz (88.4 kg)   BMI 29.62 kg/m²     Physical Exam  Vitals signs reviewed. Constitutional:       General: He is awake. Appearance: He is not ill-appearing or diaphoretic. HENT:      Right Ear: External ear normal.      Left Ear: External ear normal.   Eyes:      General:         Right eye: No discharge. Left eye: No discharge.       Conjunctiva/sclera: Conjunctivae normal. Neck:      Thyroid: No thyromegaly. Trachea: No tracheal deviation. Pulmonary:      Effort: Pulmonary effort is normal. No respiratory distress. Breath sounds: No stridor. Musculoskeletal:      Lumbar back: He exhibits tenderness. He exhibits no edema and no deformity. Skin:     General: Skin is warm and dry. Neurological:      Mental Status: He is alert and oriented to person, place, and time. Gait: Gait normal.      Deep Tendon Reflexes: Reflexes are normal and symmetric. Psychiatric:         Attention and Perception: Attention and perception normal.         Behavior: Behavior normal.         Record/Diagnostics Review:    As above, I did review the imaging      Assessment:  1. Lumbosacral spondylosis without myelopathy    2. Other chronic pain    3. Chronic bilateral low back pain, unspecified whether sciatica present    4. Encounter for long-term opiate analgesic use    5. Postlaminectomy syndrome, lumbar region        Treatment Plan:  DISCUSSION: Treatment options discussed with patient and all questions answered to patient's satisfaction. OARRS Review: Reviewed and acceptable for medications prescribed. TREATMENT OPTIONS:     Discussed the importance of continued physical therapy and exercise program as well. Discussed different treatment options including continued conservative care such as physical therapy, chiropractic care, acupuncture. Also discussed SCS retrial - has recently had system removed. Discussed different interventional options such as epidural steroids or medial branch blocks. Also discussed continued   Has failed conservative options, significant axial low back pain with degenerative facet changes on MRI, good candidate for diagnostic lumbar facets at bilateral L3-4 and  L4-5 to see if pain is facet mediated, if this is beneficial would be a candidate for radiofrequency ablation. Heather Daigle M.D.         I have reviewed the chief complaint and history of present illness (including ROS and PFSH) and vital documentation by my staff and I agree with their documentation and have added where applicable.

## 2020-07-21 ENCOUNTER — HOSPITAL ENCOUNTER (OUTPATIENT)
Dept: PHYSICAL THERAPY | Age: 60
Setting detail: THERAPIES SERIES
Discharge: HOME | End: 2020-07-21
Payer: COMMERCIAL

## 2020-07-21 PROCEDURE — 97110 THERAPEUTIC EXERCISES: CPT

## 2020-07-21 ASSESSMENT — PAIN DESCRIPTION - LOCATION
LOCATION_2: SHOULDER
LOCATION: BACK

## 2020-07-21 ASSESSMENT — PAIN DESCRIPTION - DESCRIPTORS: DESCRIPTORS: CONSTANT

## 2020-07-21 ASSESSMENT — PAIN DESCRIPTION - FREQUENCY: FREQUENCY: CONTINUOUS

## 2020-07-21 ASSESSMENT — PAIN DESCRIPTION - ORIENTATION
ORIENTATION: LOWER
ORIENTATION_2: RIGHT

## 2020-07-21 ASSESSMENT — PAIN DESCRIPTION - INTENSITY: RATING_2: 5

## 2020-07-21 ASSESSMENT — PAIN SCALES - GENERAL: PAINLEVEL_OUTOF10: 7

## 2020-07-21 ASSESSMENT — PAIN DESCRIPTION - DURATION: DURATION_2: INTERMITTENT

## 2020-07-21 NOTE — PROGRESS NOTES
[address]   Outpatient Physical Therapy  [address] [facility]. [address]  Phone: [phone]  Fax: [phone]  Daily Progress Note    Date: 20    Patient Name: Sandra Kenny        MRN: [id number]  Account: [de-identified] : 1960      General Information:  Referring Practitioner: Dr Kaitlynn Liriano/Dr Janeth Erazo   Referral Date : 20  Diagnosis: tear R rotator cuff M75.101 lumbar spondylosis with radiculopathy  Follows Commands: Within Functional Limits  Onset Date: 18  PT Insurance Information: cigna/MMO  Total # of Visits Approved: 12  Total # of Visits to Date: 9    Subjective:  Subjective: Patient reports he will get the shot in back that will be able to tell MD where to \"burn nerves\" appointment is made later in      Pain:  Patient Currently in Pain: Yes  Pain Assessment: 0-10  Pain Level: 7  Pain Location: Back  Pain Orientation: Lower  Pain Descriptors: Constant  Pain Frequency: Continuous  Pain Rating 2: 5  Pain Location 2: Shoulder  Pain Orientation 2: Right  Pain Duration 2: Intermittent    Objective:  Exercise 1: alt arm/leg lift 10x10\"  Exercise 2: B SLR 10x10\"  Exercise 3: bridging 10x10\"  Exercise 4: prone 2 pillow on chest hip ext 10x10\"  Exercise 5: B isometric hip flex 10x10\" hold  Exercise 6: saad's R 4 way 0# 10x3  Exercise 7: R sarah ER in L S/L 2# 3x10  Exercise 8: supine R sarah protraction 5# 3x10  Exercise 9: sarah R flex/ext/scaption 2# 15x2  Exercise 10: prone R sarah lawnmower pull 5# 3x10  Exercise 11: prone R sarah ext 5# 3x10  Exercise 12: 35# rows/pull down 3x10  Exercise 13: walking free motion machine 13# 4 way 5x  Exercise 14: 4 way hip BLE free motion machine 7# 10x    Assessment: Body structures, Functions, Activity limitations: Decreased functional mobility ; Decreased ADL status; Decreased ROM; Decreased strength; Increased pain  Assessment: continued with back exercises this date  Treatment Diagnosis: stiffness R sarah M25.611 weakness R sarah M62.511  REQUIRES PT FOLLOW UP: Yes  Discharge Recommendations: Home independently  Activity Tolerance: Patient limited by pain    Plan:  Plan: Continue with current plan    Therapy Time:  Time In: 1415  Time Out: 1500  Minutes: 45  Timed Code Treatment Minutes: 45 Minutes    Treatment Charges: Minutes Units   []  Ultrasound     []  Electrical-Stim     []  Iontophoresis     []  Traction     []  Massage       []  Eval     []  Gait     []  Vasopneumatic Device     [x]  Ther Exercise 45  3   []  Manual Therapy       []  Ther Activities       []  Aquatics     []  Neuro Re-Ed       []  Other       Total Treatment Time: 39 3       Jodie Greer, NITIN

## 2020-07-23 ENCOUNTER — HOSPITAL ENCOUNTER (OUTPATIENT)
Dept: PHYSICAL THERAPY | Age: 60
Setting detail: THERAPIES SERIES
Discharge: HOME | End: 2020-07-23
Payer: COMMERCIAL

## 2020-07-23 PROCEDURE — 97110 THERAPEUTIC EXERCISES: CPT

## 2020-07-23 ASSESSMENT — PAIN DESCRIPTION - FREQUENCY: FREQUENCY: INTERMITTENT

## 2020-07-23 ASSESSMENT — PAIN DESCRIPTION - ORIENTATION: ORIENTATION: RIGHT

## 2020-07-23 ASSESSMENT — PAIN DESCRIPTION - LOCATION: LOCATION: SHOULDER

## 2020-07-23 ASSESSMENT — PAIN SCALES - GENERAL: PAINLEVEL_OUTOF10: 9

## 2020-07-23 NOTE — PROGRESS NOTES
Physical Therapy Re-evaluation Note    Date: 2020  Patient Name: Rick Vu  MRN: [id number]  : 1960     Treatment Diagnosis: stiffness R sarah M25.611 weakness R sarah M62.511    Subjective   General  Referring Practitioner: Dr Lawrence Guillen  Referral Date : 20  Diagnosis: tear R rotator cuff M75.101 lumbar spondylosis with radiculopathy  General Comment  Comments: will be getting shot for his back starting 20  PT Visit Information  Onset Date: 18  PT Insurance Information: cigna/MMO  Total # of Visits Approved: 12  Total # of Visits to Date: 10  Subjective  Subjective: pain R sarah noted when doing overhead motion  Pain Screening  Patient Currently in Pain: Yes  Pain Assessment  Pain Assessment: 0-10  Pain Level: 9  Pain Location: Shoulder  Pain Orientation: Right  Pain Frequency: Intermittent  Vital Signs  Patient Currently in Pain: Yes  Objective  AROM RUE (degrees)  R Shoulder Flexion 0-180: 0-140  R Shoulder ABduction 0-180: 0-140  R Shoulder Int Rotation  0-70: 0-30  R Shoulder Ext Rotation 0-90: 0-90  Strength RUE  Comment: sarah 4/5 scap 3+/5 distal 5/5     Exercises  Exercise 1: B elbow curls 15# 3x10  Exercise 2: B elbow ext 20# 3x10  Exercise 3: chest press front/ back 25# 3x10  Exercise 4: lat pull down 25# 3x10  Exercise 5: ball on wall 10x10  Exercise 6: saad's R 4 way 2# 10x3  Exercise 7: R sarah ER in L S/L 2# 3x10  Exercise 8: supine R sarah protraction 5# 3x10  Exercise 9: sarah R flex/ext/scaption 2# 15x2  Exercise 10: prone R sarah lawnmower pull 5# 3x10  Exercise 11: prone R sarah ext 5# 3x10  Exercise 12: 35# rows/pull down 3x10  Exercise 13: finger ladder 10x10\" R    Assessment   Conditions Requiring Skilled Therapeutic Intervention  Assessment: SHORT TERM GOALS MET 2/4 LONG TERM GOAL MET 0/1  Treatment Diagnosis: stiffness R sarah M25.611 weakness R sarah M62.511  REQUIRES PT FOLLOW UP: Yes  Discharge Recommendations: Home independently  Activity Tolerance  Activity Tolerance: Patient Tolerated treatment well     Plan   Plan  Current Treatment Recommendations: Strengthening, ROM, Home Exercise Program  Plan Comment: RECOMMEND CONTINUE PT Sheree. PLEASE ADVISE.     OutComes Score  UEFS Score: 71.25 (07/23/20 1420)    Goals  Short term goals  Short term goal 1: decrease pain  R sarah by 50% so patient can do ADL easier(not met)  Short term goal 2: increase AROM R sarah to full(not met)  Short term goal 3: increase strength R scap muscles by 1/2 grade or better(met)  Short term goal 4: indep with HEP(met)  Long term goals  Long term goal 1: improve UEFS from 76 to 66 or better(partly met UEFS 71)     Treatment Charges: Minutes Units   []  Ultrasound     []  Electrical-Stim     []  Iontophoresis     []  Traction     []  Massage       []  Eval     []  Gait     [x]  Ther Exercise 45  3    []  Manual Therapy       []  Ther Activities       []  Aquatics     []  Vasopneumatic Device     []  Neuro Re-Ed       []  Other       Total Treatment Time: 45 3        Therapy Time   Individual Concurrent Group Co-treatment   Time In 1420         Time Out 1505         Minutes 45         Timed Code Treatment Minutes: 45 Minutes    Treatment Plan:  [x] Therapeutic Exercise   89133  [] Iontophoresis: 4 mg/mL Dexamethasone Sodium Phosphate  mAmin  36241   [] Therapeutic Activity  88364 [] Vasopneumatic cold with compression  84108    [] Gait Training   75447 [] Ultrasound   50768   [] Neuromuscular Re-education  20642 [] Electrical Stimulation Unattended  94762   [] Manual Therapy  09695 [] Electrical Stimulation Attended  56647   [x] Instruction in HEP  [] Lumbar/Cervical Traction  83158   [] Aquatic Therapy   11478 [] Cold/hotpack    [] Massage   50197      [] Dry Needling, 1 or 2 muscles  33949   [] Biofeedback, first 15 minutes   56973  [] Biofeedback, additional 15 minutes   46614 [] Dry Needling, 3 or more muscles  59547      Frequency:         2  X/wk x        4  wk's      PLEASE SIGN BELOW IF OKAY TO CONTINUE PT        Medicare/Regulatory Requirements:  I have reviewed this plan of care and certify a need for   Medically necessary rehabilitation services.   [] Physician Signature    Date:     Electronically signed by: Lisa Pérez, [address] [facility] [address], [address] [facility]  Phone [phone]  Fax [phone]

## 2020-07-27 ENCOUNTER — HOSPITAL ENCOUNTER (OUTPATIENT)
Dept: PHYSICAL THERAPY | Age: 60
Setting detail: THERAPIES SERIES
Discharge: HOME | End: 2020-07-27
Payer: COMMERCIAL

## 2020-07-27 PROCEDURE — 97110 THERAPEUTIC EXERCISES: CPT

## 2020-07-27 ASSESSMENT — PAIN DESCRIPTION - ORIENTATION: ORIENTATION: RIGHT

## 2020-07-27 ASSESSMENT — PAIN DESCRIPTION - DESCRIPTORS: DESCRIPTORS: CONSTANT

## 2020-07-27 ASSESSMENT — PAIN DESCRIPTION - LOCATION: LOCATION: SHOULDER

## 2020-07-27 ASSESSMENT — PAIN SCALES - GENERAL: PAINLEVEL_OUTOF10: 7

## 2020-07-27 NOTE — PROGRESS NOTES
Ther Exercise 35  2   []  Manual Therapy       []  Ther Activities       []  Aquatics     []  Neuro Re-Ed       []  Other       Total Treatment Time: 28 2       Luther Conrad, PTA

## 2020-07-29 ENCOUNTER — HOSPITAL ENCOUNTER (OUTPATIENT)
Dept: PHYSICAL THERAPY | Age: 60
Setting detail: THERAPIES SERIES
Discharge: HOME | End: 2020-07-29
Payer: COMMERCIAL

## 2020-07-29 PROCEDURE — 97110 THERAPEUTIC EXERCISES: CPT

## 2020-07-29 ASSESSMENT — PAIN DESCRIPTION - LOCATION: LOCATION: SHOULDER

## 2020-07-29 ASSESSMENT — PAIN DESCRIPTION - FREQUENCY: FREQUENCY: INTERMITTENT

## 2020-07-29 ASSESSMENT — PAIN DESCRIPTION - DESCRIPTORS: DESCRIPTORS: CONSTANT

## 2020-07-29 ASSESSMENT — PAIN SCALES - GENERAL: PAINLEVEL_OUTOF10: 0

## 2020-07-29 ASSESSMENT — PAIN DESCRIPTION - ORIENTATION: ORIENTATION: RIGHT

## 2020-07-29 NOTE — PROGRESS NOTES
Physical Therapy  Daily Treatment Note  Date: 2020  Patient Name: Luis Gregory  MRN: [id number]     :   1960    Subjective:   General  Chart Reviewed: Yes  Additional Pertinent Hx:  stimulator removed so he can have a MRI nd low back pain got worst, R sarah pain started  Family / Caregiver Present: No  Referring Practitioner: Dr Patrick Orellana  PT Visit Information  Onset Date: 18  PT Insurance Information: cigna/MMO  Total # of Visits Approved: 20  Total # of Visits to Date: 12  Subjective  Subjective: Patient reports today with no new complaints. Noted he only feels pain with overhead motion. Pain Screening  Patient Currently in Pain: No  Pain Assessment  Pain Assessment: 0-10  Pain Level: 0  Patient's Stated Pain Goal: No pain  Pain Location: Shoulder  Pain Orientation: Right  Pain Descriptors: Constant  Pain Frequency: Intermittent  Vital Signs  Patient Currently in Pain: No       Treatment Activities:     Exercises  Exercise 1: B elbow curls 25# 3x10  Exercise 2: B elbow ext 25# 3x10  Exercise 3: chest press front/ back 35# 3x10  Exercise 4: lat pull down 35# 3x10  Exercise 5: ball on wall 10x10  Exercise 6: saad's R 4 way 2# 10x3  Exercise 7: R sarah ER in L S/L 2# 3x10  Exercise 8: supine R sarah protraction 5# 3x10  Exercise 9: sarah R flex/ext/scaption 2# 15x2  Exercise 10: prone R sarah lawnmower pull 5# 3x10  Exercise 11: prone R sarah ext 5# 3x10  Exercise 12: 35# rows/pull down 3x10  Exercise 13: finger ladder 10x10\" R     Assessment:   Conditions Requiring Skilled Therapeutic Intervention  Body structures, Functions, Activity limitations: Decreased functional mobility ; Decreased ADL status; Decreased ROM; Decreased strength; Increased pain  Assessment: Cont with exercises per chart for R shoulder strengthening. Increased weight of cable column exercises to increase strengthening potential of exercise. Patient with notable muscle fatigue post exercises.   Treatment Diagnosis: stiffness R sarah M25.611 weakness R sarah M62.511  Prognosis: Good  Decision Making: Low Complexity  REQUIRES PT FOLLOW UP: Yes      Goals:  Short term goals  Time Frame for Short term goals: 6 visits  Short term goal 1: decrease pain  R sarah by 50% so patient can do ADL easier  Short term goal 2: increase AROM R sarah to full  Short term goal 3: increase strength R scap muscles by 1/2 grade or better  Short term goal 4: indep with HEP  Long term goals  Time Frame for Long term goals : 12 visits  Long term goal 1: improve UEFS from 76 to 66 or better  Patient Goals   Patient goals : no pain    Plan:    Plan  Times per week: 2x/week  Plan weeks: 6 weeks  Current Treatment Recommendations: Strengthening, ROM, Home Exercise Program  Timed Code Treatment Minutes: 35 Minutes     Therapy Time   Individual Concurrent Group Co-treatment   Time In 0845         Time Out 0925         Minutes 40         Timed Code Treatment Minutes: 35 Minutes      Treatment Charges: Minutes Units   []  Ultrasound     []  Electrical-Stim     []  Iontophoresis     []  Traction     []  Massage       []  Eval     []  Gait     [x]  Ther Exercise 35  2   []  Manual Therapy       []  Ther Activities       []  Aquatics     []  Vasopneumatic Device     []  Neuro Re-Ed       []  Other       Total Treatment Time: 35 2           Anders Najera, PTA

## 2020-07-30 ENCOUNTER — TELEPHONE (OUTPATIENT)
Dept: PAIN MANAGEMENT | Age: 60
End: 2020-07-30

## 2020-08-03 ENCOUNTER — HOSPITAL ENCOUNTER (OUTPATIENT)
Dept: PHYSICAL THERAPY | Age: 60
Setting detail: THERAPIES SERIES
Discharge: HOME | End: 2020-08-03
Payer: COMMERCIAL

## 2020-08-03 ENCOUNTER — HOSPITAL ENCOUNTER (OUTPATIENT)
Dept: PREADMISSION TESTING | Age: 60
Setting detail: SPECIMEN
Discharge: HOME | End: 2020-08-07
Payer: COMMERCIAL

## 2020-08-03 PROCEDURE — U0003 INFECTIOUS AGENT DETECTION BY NUCLEIC ACID (DNA OR RNA); SEVERE ACUTE RESPIRATORY SYNDROME CORONAVIRUS 2 (SARS-COV-2) (CORONAVIRUS DISEASE [COVID-19]), AMPLIFIED PROBE TECHNIQUE, MAKING USE OF HIGH THROUGHPUT TECHNOLOGIES AS DESCRIBED BY CMS-2020-01-R: HCPCS

## 2020-08-03 NOTE — PROGRESS NOTES
Physical Therapy  Daily Treatment Note  Date: 8/3/2020  Patient Name: Jas Norris  MRN: [id number]     :   1960    Subjective:   General  Chart Reviewed: Yes  Referring Practitioner: Dr Norma Ochoa  PT Visit Information  Onset Date: 18  PT Insurance Information: cigna/MMO  Total # of Visits Approved: 20  Total # of Visits to Date: 12  No Show: 0  Canceled Appointment: 1  General Comment  Comments: Patient cancelled today, confirmed appt         Chris Bradford, PTA

## 2020-08-05 ENCOUNTER — HOSPITAL ENCOUNTER (OUTPATIENT)
Dept: PHYSICAL THERAPY | Age: 60
Setting detail: THERAPIES SERIES
Discharge: HOME | End: 2020-08-05
Payer: COMMERCIAL

## 2020-08-05 LAB — SARS-COV-2, NAA: NOT DETECTED

## 2020-08-05 PROCEDURE — 97110 THERAPEUTIC EXERCISES: CPT

## 2020-08-05 NOTE — PROGRESS NOTES
Physical Therapy  Daily Treatment Note  Date: 2020  Patient Name: Anu Dunne  MRN: [id number]     :   1960    Subjective:   General  Chart Reviewed: Yes  Additional Pertinent Hx:  stimulator removed so he can have a MRI nd low back pain got worst, R sarah pain started  Family / Caregiver Present: No  Referring Practitioner: Dr Jerzy Colbert  PT Visit Information  Onset Date: 18  PT Insurance Information: cigna/MMO  Total # of Visits Approved: 20  Total # of Visits to Date: 13  No Show: 0  Canceled Appointment: 1  Subjective  Subjective: Pateint reports today feeling well overall with no new complaints. Pain Screening  Patient Currently in Pain: No  Pain Assessment  Pain Assessment: 0-10  Patient's Stated Pain Goal: No pain  Vital Signs  Patient Currently in Pain: No       Treatment Activities:      Exercises  Exercise 1: B elbow curls 25# 3x10  Exercise 2: B elbow ext 25# 3x10  Exercise 3: chest press front/ back 35# 3x10  Exercise 4: lat pull down 35# 3x10  Exercise 5: ball on wall 10x10  Exercise 6: saad's R 4 way 2# 10x3  Exercise 7: R sarah ER in L S/L 2# 3x10  Exercise 8: supine R sarah protraction 5# 3x10  Exercise 9: sarah R flex/ext/scaption 2# 15x2  Exercise 10: prone R sarah lawnmower pull 5# 3x10  Exercise 11: prone R sarah ext 5# 3x10  Exercise 12: 35# rows/pull down 3x10  Exercise 13: finger ladder 10x10\" R  Exercise 15: Horizontal Abduction/ B ER 2x10 orange band     Assessment:   Conditions Requiring Skilled Therapeutic Intervention  Body structures, Functions, Activity limitations: Decreased functional mobility ; Decreased ADL status; Decreased ROM; Decreased strength; Increased pain  Assessment: Cont with exercises per chart for R shoulder strengthening. Added standing horizontal abduction and B ER for additonal periscpaular strengthening. Patient with no change in pain, only muscle fatigue post exercise.   Treatment Diagnosis: stiffness R sarah M25.611 weakness R sarah M62.511  Prognosis:

## 2020-08-06 ENCOUNTER — TELEPHONE (OUTPATIENT)
Dept: PAIN MANAGEMENT | Age: 60
End: 2020-08-06

## 2020-08-10 ENCOUNTER — ANESTHESIA EVENT (OUTPATIENT)
Dept: OPERATING ROOM | Age: 60
End: 2020-08-10
Payer: COMMERCIAL

## 2020-08-10 ENCOUNTER — HOSPITAL ENCOUNTER (OUTPATIENT)
Dept: PHYSICAL THERAPY | Age: 60
Setting detail: THERAPIES SERIES
Discharge: HOME | End: 2020-08-10
Payer: COMMERCIAL

## 2020-08-10 PROCEDURE — 97110 THERAPEUTIC EXERCISES: CPT

## 2020-08-10 RX ORDER — SODIUM CHLORIDE 0.9 % (FLUSH) 0.9 %
10 SYRINGE (ML) INJECTION EVERY 12 HOURS SCHEDULED
Status: CANCELLED | OUTPATIENT
Start: 2020-08-10

## 2020-08-10 RX ORDER — SODIUM CHLORIDE 0.9 % (FLUSH) 0.9 %
10 SYRINGE (ML) INJECTION PRN
Status: CANCELLED | OUTPATIENT
Start: 2020-08-10

## 2020-08-10 ASSESSMENT — PAIN DESCRIPTION - FREQUENCY: FREQUENCY: INTERMITTENT

## 2020-08-10 NOTE — PROGRESS NOTES
Physical Therapy  Daily Treatment Note  Date: 8/10/2020  Patient Name: Lauren Geronimo  MRN: [id number]     :   1960    Subjective:   General  Chart Reviewed: Yes  Additional Pertinent Hx:  stimulator removed so he can have a MRI nd low back pain got worst, R sarah pain started  Family / Caregiver Present: No  Referring Practitioner: Dr Kimberly Oglesby  PT Visit Information  Onset Date: 18  PT Insurance Information: cigna/MMO  Total # of Visits Approved: 20  Total # of Visits to Date: 14  No Show: 0  Canceled Appointment: 1  Subjective  Subjective: Patinet reports today noting cont improvement in symptoms. Noted that any overhead motion still causes pain. Pain Screening  Patient Currently in Pain: No  Pain Assessment  Pain Frequency: Intermittent  Vital Signs  Patient Currently in Pain: No       Treatment Activities:     Exercises  Exercise 1: B elbow curls 35# 3x10  Exercise 2: B elbow ext 35# 3x10  Exercise 3: chest press front/ back 35# 3x10  Exercise 4: lat pull down 35# 3x10  Exercise 5: ball on wall 10x10  Exercise 6: saad's R 4 way 3# 10x3  Exercise 7: R sarah ER in L S/L 2# 3x10  Exercise 8: supine R sarah protraction 5# 3x10  Exercise 9: sarah R flex/ext/scaption 2# 15x2  Exercise 10: prone R sarah lawnmower pull 5# 3x10  Exercise 11: prone R sarah ext 5# 3x10  Exercise 12: 45# rows/pull down 3x10  Exercise 15: Horizontal Abduction/ B ER 2x10 orange band    Assessment:   Conditions Requiring Skilled Therapeutic Intervention  Body structures, Functions, Activity limitations: Decreased functional mobility ; Decreased ADL status; Decreased ROM; Decreased strength; Increased pain  Assessment: Cont with exercises per chart for R shoulder strengthening. Progressed weight of cable column exercises to improve strengthenign potential of exercise. Patient cont with good tolerance to exericses overall, noting only muscle fatiguepost exercises.   Treatment Diagnosis: stiffness R sarah M25.611 weakness R sarah M62.511  Prognosis: Good  Decision Making: Low Complexity  REQUIRES PT FOLLOW UP: Yes      Goals:  Short term goals  Time Frame for Short term goals: 6 visits  Short term goal 1: decrease pain  R sarah by 50% so patient can do ADL easier  Short term goal 2: increase AROM R sarah to full  Short term goal 3: increase strength R scap muscles by 1/2 grade or better  Short term goal 4: indep with HEP  Long term goals  Time Frame for Long term goals : 12 visits  Long term goal 1: improve UEFS from 76 to 66 or better  Patient Goals   Patient goals : no pain    Plan:    Plan  Times per week: 2x/week  Plan weeks: 6 weeks  Current Treatment Recommendations: Strengthening, ROM, Home Exercise Program  Timed Code Treatment Minutes: 35 Minutes     Therapy Time   Individual Concurrent Group Co-treatment   Time In 0845         Time Out 0920         Minutes 35         Timed Code Treatment Minutes: 35 Minutes      Treatment Charges: Minutes Units   []  Ultrasound     []  Electrical-Stim     []  Iontophoresis     []  Traction     []  Massage       []  Eval     []  Gait     [x]  Ther Exercise 35  2   []  Manual Therapy       []  Ther Activities       []  Aquatics     []  Vasopneumatic Device     []  Neuro Re-Ed       []  Other       Total Treatment Time: 35 2           Adriana Morgan PTA

## 2020-08-11 ENCOUNTER — HOSPITAL ENCOUNTER (OUTPATIENT)
Dept: PREADMISSION TESTING | Age: 60
Setting detail: SPECIMEN
Discharge: HOME | End: 2020-08-15
Payer: COMMERCIAL

## 2020-08-11 PROCEDURE — U0003 INFECTIOUS AGENT DETECTION BY NUCLEIC ACID (DNA OR RNA); SEVERE ACUTE RESPIRATORY SYNDROME CORONAVIRUS 2 (SARS-COV-2) (CORONAVIRUS DISEASE [COVID-19]), AMPLIFIED PROBE TECHNIQUE, MAKING USE OF HIGH THROUGHPUT TECHNOLOGIES AS DESCRIBED BY CMS-2020-01-R: HCPCS

## 2020-08-12 ENCOUNTER — HOSPITAL ENCOUNTER (OUTPATIENT)
Dept: PHYSICAL THERAPY | Age: 60
Setting detail: THERAPIES SERIES
Discharge: HOME | End: 2020-08-12
Payer: COMMERCIAL

## 2020-08-12 PROCEDURE — 97110 THERAPEUTIC EXERCISES: CPT

## 2020-08-12 ASSESSMENT — PAIN SCALES - GENERAL: PAINLEVEL_OUTOF10: 0

## 2020-08-12 ASSESSMENT — PAIN DESCRIPTION - PROGRESSION: CLINICAL_PROGRESSION: GRADUALLY IMPROVING

## 2020-08-12 ASSESSMENT — PAIN DESCRIPTION - FREQUENCY: FREQUENCY: INTERMITTENT

## 2020-08-12 NOTE — PROGRESS NOTES
Physical Therapy  Daily Treatment Note  Date: 2020  Patient Name: Paty Gipson  MRN: [id number]     :   1960    Subjective:   General  Chart Reviewed: Yes  Additional Pertinent Hx:  stimulator removed so he can have a MRI nd low back pain got worst, R sarah pain started  Family / Caregiver Present: No  Referring Practitioner: Dr Melissa Kim  PT Visit Information  Onset Date: 18  PT Insurance Information: cigna/MMO  Total # of Visits Approved: 20  Total # of Visits to Date: 15  No Show: 0  Canceled Appointment: 1  Subjective  Subjective: Patient reports today with no new complaints. Patient denies any pain at rest.  Pain Screening  Patient Currently in Pain: No  Pain Assessment  Pain Assessment: 0-10  Pain Level: 0  Patient's Stated Pain Goal: No pain  Pain Frequency: Intermittent  Clinical Progression: Gradually improving  Vital Signs  Patient Currently in Pain: No       Treatment Activities:      Exercises  Exercise 1: B elbow curls 35# 3x10  Exercise 2: B elbow ext 35# 3x10  Exercise 3: chest press front/ back 35# 3x10  Exercise 4: lat pull down 35# 3x10  Exercise 5: ball on wall 10x10\"  Exercise 6: saad's R 4 way 3# 10x3  Exercise 7: R sarah ER in L S/L 3# 3x10  Exercise 8: supine R sarah protraction 5# 3x10  Exercise 9: sarah R flex/ext/scaption 2# 15x2  Exercise 10: prone R sarah lawnmower pull 5# 3x10  Exercise 11: prone R sarah ext 5# 3x10  Exercise 12: 45# rows/pull down 3x10  Exercise 15: Horizontal Abduction/ B ER 2x10 orange band  Exercise 16: ABC's 5# 1x  Exercise 17: Wall Walks with Band 10x Orange  Exercise 18: Ball on wall for scapular stabilization 20x ea up/down, side/side, CW, CCW     Assessment:   Conditions Requiring Skilled Therapeutic Intervention  Body structures, Functions, Activity limitations: Decreased functional mobility ; Decreased ADL status; Decreased ROM; Decreased strength; Increased pain  Assessment: Cont with exercises per chart for R shoulder strengthening.  Added shoulder stabilization exercises this date with patient noting only increased fatigue post. Patient noted cont pain at end range of motion with no pain with active motion.   Treatment Diagnosis: stiffness R sarah M25.611 weakness R sarah M62.511  Prognosis: Good  Decision Making: Low Complexity  REQUIRES PT FOLLOW UP: Yes  Treatment Initiated : therapeutic ex to lumbar and R sarah  Discharge Recommendations: Home independently     Goals:  Short term goals  Time Frame for Short term goals: 6 visits  Short term goal 1: decrease pain  R sarah by 50% so patient can do ADL easier  Short term goal 2: increase AROM R sarah to full  Short term goal 3: increase strength R scap muscles by 1/2 grade or better  Short term goal 4: indep with HEP  Long term goals  Time Frame for Long term goals : 12 visits  Long term goal 1: improve UEFS from 76 to 66 or better  Patient Goals   Patient goals : no pain    Plan:    Plan  Times per week: 2x/week  Plan weeks: 6 weeks  Specific instructions for Next Treatment: progress with DLSP/R scap strengthening ex  Current Treatment Recommendations: Strengthening, ROM, Home Exercise Program  Timed Code Treatment Minutes: 40 Minutes     Therapy Time   Individual Concurrent Group Co-treatment   Time In 0832         Time Out 0912         Minutes 40         Timed Code Treatment Minutes: 40 Minutes      Treatment Charges: Minutes Units   []  Ultrasound     []  Electrical-Stim     []  Iontophoresis     []  Traction     []  Massage       []  Eval     []  Gait     [x]  Ther Exercise 40 3   []  Manual Therapy       []  Ther Activities       []  Aquatics     []  Vasopneumatic Device     []  Neuro Re-Ed       []  Other       Total Treatment Time: 40 3           Eladio Nguyen PTA

## 2020-08-13 LAB — SARS-COV-2, NAA: NOT DETECTED

## 2020-08-14 ENCOUNTER — APPOINTMENT (OUTPATIENT)
Dept: GENERAL RADIOLOGY | Age: 60
End: 2020-08-14
Attending: PAIN MEDICINE
Payer: COMMERCIAL

## 2020-08-14 ENCOUNTER — HOSPITAL ENCOUNTER (OUTPATIENT)
Age: 60
Setting detail: OUTPATIENT SURGERY
Discharge: HOME | End: 2020-08-14
Attending: PAIN MEDICINE | Admitting: PAIN MEDICINE
Payer: COMMERCIAL

## 2020-08-14 ENCOUNTER — ANESTHESIA (OUTPATIENT)
Dept: OPERATING ROOM | Age: 60
End: 2020-08-14
Payer: COMMERCIAL

## 2020-08-14 VITALS
RESPIRATION RATE: 17 BRPM | DIASTOLIC BLOOD PRESSURE: 75 MMHG | SYSTOLIC BLOOD PRESSURE: 120 MMHG | WEIGHT: 194.6 LBS | HEIGHT: 68 IN | BODY MASS INDEX: 29.49 KG/M2 | HEART RATE: 67 BPM | OXYGEN SATURATION: 96 % | TEMPERATURE: 98.3 F

## 2020-08-14 VITALS
DIASTOLIC BLOOD PRESSURE: 80 MMHG | SYSTOLIC BLOOD PRESSURE: 135 MMHG | RESPIRATION RATE: 22 BRPM | OXYGEN SATURATION: 98 %

## 2020-08-14 PROCEDURE — 3700000000 HC ANESTHESIA ATTENDED CARE: Performed by: PAIN MEDICINE

## 2020-08-14 PROCEDURE — 3209999900 FLUORO FOR SURGICAL PROCEDURES

## 2020-08-14 PROCEDURE — 2500000003 HC RX 250 WO HCPCS: Performed by: PAIN MEDICINE

## 2020-08-14 PROCEDURE — 7100000011 HC PHASE II RECOVERY - ADDTL 15 MIN: Performed by: PAIN MEDICINE

## 2020-08-14 PROCEDURE — 3600000002 HC SURGERY LEVEL 2 BASE: Performed by: PAIN MEDICINE

## 2020-08-14 PROCEDURE — 64494 INJ PARAVERT F JNT L/S 2 LEV: CPT | Performed by: PAIN MEDICINE

## 2020-08-14 PROCEDURE — 7100000010 HC PHASE II RECOVERY - FIRST 15 MIN: Performed by: PAIN MEDICINE

## 2020-08-14 PROCEDURE — 64493 INJ PARAVERT F JNT L/S 1 LEV: CPT | Performed by: PAIN MEDICINE

## 2020-08-14 PROCEDURE — 6360000002 HC RX W HCPCS: Performed by: NURSE ANESTHETIST, CERTIFIED REGISTERED

## 2020-08-14 PROCEDURE — 2709999900 HC NON-CHARGEABLE SUPPLY: Performed by: PAIN MEDICINE

## 2020-08-14 PROCEDURE — 3600000012 HC SURGERY LEVEL 2 ADDTL 15MIN: Performed by: PAIN MEDICINE

## 2020-08-14 PROCEDURE — 3700000001 HC ADD 15 MINUTES (ANESTHESIA): Performed by: PAIN MEDICINE

## 2020-08-14 RX ORDER — ONDANSETRON 2 MG/ML
4 INJECTION INTRAMUSCULAR; INTRAVENOUS
Status: DISCONTINUED | OUTPATIENT
Start: 2020-08-14 | End: 2020-08-14 | Stop reason: HOSPADM

## 2020-08-14 RX ORDER — DIPHENHYDRAMINE HYDROCHLORIDE 50 MG/ML
12.5 INJECTION INTRAMUSCULAR; INTRAVENOUS
Status: DISCONTINUED | OUTPATIENT
Start: 2020-08-14 | End: 2020-08-14 | Stop reason: HOSPADM

## 2020-08-14 RX ORDER — BUPIVACAINE HYDROCHLORIDE 2.5 MG/ML
INJECTION, SOLUTION INFILTRATION; PERINEURAL PRN
Status: DISCONTINUED | OUTPATIENT
Start: 2020-08-14 | End: 2020-08-14 | Stop reason: ALTCHOICE

## 2020-08-14 RX ORDER — PROMETHAZINE HYDROCHLORIDE 25 MG/ML
6.25 INJECTION, SOLUTION INTRAMUSCULAR; INTRAVENOUS
Status: DISCONTINUED | OUTPATIENT
Start: 2020-08-14 | End: 2020-08-14 | Stop reason: HOSPADM

## 2020-08-14 RX ORDER — MIDAZOLAM HYDROCHLORIDE 1 MG/ML
INJECTION INTRAMUSCULAR; INTRAVENOUS PRN
Status: DISCONTINUED | OUTPATIENT
Start: 2020-08-14 | End: 2020-08-14 | Stop reason: SDUPTHER

## 2020-08-14 RX ORDER — HYDRALAZINE HYDROCHLORIDE 20 MG/ML
5 INJECTION INTRAMUSCULAR; INTRAVENOUS EVERY 10 MIN PRN
Status: DISCONTINUED | OUTPATIENT
Start: 2020-08-14 | End: 2020-08-14 | Stop reason: HOSPADM

## 2020-08-14 RX ADMIN — MIDAZOLAM HYDROCHLORIDE 2 MG: 1 INJECTION, SOLUTION INTRAMUSCULAR; INTRAVENOUS at 10:50

## 2020-08-14 ASSESSMENT — PULMONARY FUNCTION TESTS
PIF_VALUE: 1

## 2020-08-14 ASSESSMENT — PAIN SCALES - GENERAL
PAINLEVEL_OUTOF10: 0
PAINLEVEL_OUTOF10: 0

## 2020-08-14 ASSESSMENT — PAIN - FUNCTIONAL ASSESSMENT
PAIN_FUNCTIONAL_ASSESSMENT: 0-10
PAIN_FUNCTIONAL_ASSESSMENT: PREVENTS OR INTERFERES SOME ACTIVE ACTIVITIES AND ADLS

## 2020-08-14 NOTE — ANESTHESIA POSTPROCEDURE EVALUATION
POST- ANESTHESIA EVALUATION       Pt Name: Ramu Tapia  MRN: [id number]  [place]: 1960  Date of evaluation: 8/14/2020  Time:  12:08 PM      /75   Pulse 67   Temp 98.3 °F (36.8 °C) (Temporal)   Resp 17   Ht 5' 8\" (1.727 m)   Wt 194 lb 9.6 oz (88.3 kg)   SpO2 96%   BMI 29.59 kg/m²      Consciousness Level  Awake  Cardiopulmonary Status  Stable  Pain Adequately Treated YES  Nausea / Vomiting  NO  Adequate Hydration  YES  Anesthesia Related Complications NONE      Electronically signed by Terence Thompson MD on 8/14/2020 at 12:08 PM       Department of Anesthesiology  Postprocedure Note    Patient: Ramu Tapia  MRN: [id number]  [place]: 1960  Date of evaluation: 8/14/2020  Time:  12:08 PM     Procedure Summary     Date:  08/14/20 Room / Location:  [address] [place] [address] / [address] [place] [address]    Anesthesia Start:  6568 Anesthesia Stop:  1101    Procedure:  NERVE BLOCK - MBB # 1 L3-4, L4-5 (N/A ) 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        Anesthesia Post Evaluation

## 2020-08-14 NOTE — OP NOTE
Lumbar Facet Nerve Block Injection:  Surgeon: Lizette Pritchard     PRE-OP DIAGNOSIS: M47.817 (lumbosacral spondylosis), M54.5 (low back pain)    POST-OP DIAGNOSIS: Same. PROCEDURE PERFORMED: Lumbar Facet Nerve Block Multiple Levels  Bilateral L3 - 4 and L4 - 5. Physician confirmed and marked the surgical site. EBL: minimal      CONSENT: Patient has undergone the educational process with this procedure, is aware and fully understands the risks involved: potential damage to any and all body organs including possible bleeding, infection and nerve injury, allergic reaction and headache. Patient also understands that the procedure will be undertaken in a safe, controlled, and monitored setting. Patient recognizes that the benefits include relief from pain and reduction in the oral use of medications. Patient agreed to proceed. The patient was counseled at length about the risks of zuleyka Covid-19 during their perioperative period and any recovery window from their procedure. The patient was made aware that zuleyka Covid-19  may worsen their prognosis for recovering from their procedure  and lend to a higher morbidity and/or mortality risk. All material risks, benefits, and reasonable alternatives including postponing the procedure were discussed. The patient does wish to proceed with the procedure at this time. PREP: Timeout was performed prior to starting the procedure. The patient's back was prepped with chloroprep and draped appropriately. 5ml of 0.5% lidocaine was used to anesthetize the skin and subcutaneous tissue. PROCEDURE NOTE: A 22 gauge 3.5 inch spinal needle was advanced under  fluoroscopic guidance to the appropriate anatomic location for the medial branches corresponding to the facets at the base of the appropriate superior articular process and/or sacral ala . Aspiration was negative for blood, CSF and producing pain.  1 ml of 0.25% marcaine was then injected at each site to block medial branch nerve innervating the  Bilateral L3 - 4 and L4 - 5 facet joints. Patient tolerated the procedure well, no complications occurred. At the end of the injection the physician withdrew the needle and the nurse applied a sterile bandage to the site. Patient transferred to the recovery room in satisfactory condition. Appropriate written discharge instructions were given to the patient. If good results are obtained, Patient would be a candidate for Radiofrequency Ablation.       Fidel Rock

## 2020-08-14 NOTE — ANESTHESIA PRE PROCEDURE
Department of Anesthesiology  Preprocedure Note       Name:  Josafat Berkowitz   Age:  64 y.o.  :  1960                                          MRN:  [id number]         Date:  2020      Surgeon: Dewayne Levi):  Deep Sawant MD    Procedure: Procedure(s):  NERVE BLOCK - MBB # 1 L3-4, L4-5    Medications prior to admission:   Prior to Admission medications    Medication Sig Start Date End Date Taking? Authorizing Provider   HYDROcodone-acetaminophen (NORCO) 5-325 MG per tablet Take 1 tablet by mouth every 4 hours as needed for Pain for up to 30 days. 20  PAUL Torres CNP   ondansetron (ZOFRAN) 4 MG tablet TAKE ONE TABLET BY MOUTH EVERY 8 HOURS AS NEEDED FOR NAUSEA 20   PAUL Torres CNP   diclofenac (VOLTAREN) 75 MG EC tablet Take 1 tablet by mouth 2 times daily (with meals) for 14 days 20  Heron Liriano MD   pregabalin (LYRICA) 150 MG capsule TAKE ONE CAPSULE BY MOUTH TWICE A DAY  Patient taking differently: Take 150 mg by mouth nightly. Patient states that he currently takes only at night 3/11/20 6/10/20  PAUL Torres CNP   ibuprofen (ADVIL;MOTRIN) 800 MG tablet Take 1 tablet by mouth every 8 hours as needed for Pain 2/11/20 6/10/20  PAUL Torres CNP       Current medications:    No current facility-administered medications for this encounter. Allergies:     Allergies   Allergen Reactions    Morphine Other (See Comments)     Patient developed suicidal ideations    Fentanyl Hives       Problem List:    Patient Active Problem List   Diagnosis Code    Chronic back pain M54.9, G89.29       Past Medical History:        Diagnosis Date    Chronic back pain     Headache(784.0)     Kidney stones     Osteoarthritis     Type II or unspecified type diabetes mellitus without mention of complication, not stated as uncontrolled     diet controlled       Past Surgical History:        Procedure Laterality Date    BACK SURGERY  COLONOSCOPY  2012    LITHOTRIPSY  08/2018    OTHER SURGICAL HISTORY      spinal stimulator placement 6-7 years ago   [address]  01/30/2020    removal    SPINAL CORD STIMULATOR SURGERY N/A 1/30/2020    SPINAL CORD STIMULATOR IMPLANT REMOVAL, performed by Kelly Chaudhary DO at [address]      laminectomy X2 and Spinal Cord stimulartor implant    URETER STENT PLACEMENT         Social History:    Social History     Tobacco Use    Smoking status: Never Smoker    Smokeless tobacco: Never Used   Substance Use Topics    Alcohol use: Yes     Comment: rarly                                Counseling given: Not Answered      Vital Signs (Current): There were no vitals filed for this visit. BP Readings from Last 3 Encounters:   07/16/20 129/82   06/10/20 132/82   02/13/20 133/77       NPO Status:                                                                                 BMI:   Wt Readings from Last 3 Encounters:   07/16/20 194 lb 12.8 oz (88.4 kg)   06/10/20 190 lb (86.2 kg)   06/02/20 190 lb (86.2 kg)     There is no height or weight on file to calculate BMI.    CBC:   Lab Results   Component Value Date    WBC 5.5 03/02/2019    RBC 4.95 03/02/2019    RBC 5.01 01/02/2012    HGB 15.5 03/02/2019    HCT 46.2 03/02/2019    MCV 93.5 03/02/2019    RDW 12.8 03/02/2019     03/02/2019     01/02/2012       CMP:   Lab Results   Component Value Date     03/02/2019    K 4.1 03/02/2019     03/02/2019    CO2 22 03/02/2019    BUN 14 03/02/2019    CREATININE 0.76 03/02/2019    GFRAA >60 03/02/2019    LABGLOM >60 03/02/2019    GLUCOSE 131 03/02/2019    PROT 6.9 03/02/2019    CALCIUM 9.1 03/02/2019    BILITOT 0.66 03/02/2019    ALKPHOS 64 03/02/2019    AST 17 03/02/2019    ALT 23 03/02/2019       POC Tests: No results for input(s): POCGLU, POCNA, POCK, POCCL, POCBUN, POCHEMO, POCHCT in the last 72 hours.     Coags: No results found for: PROTIME, INR, APTT    HCG (If Applicable): No results found for: PREGTESTUR, PREGSERUM, HCG, HCGQUANT     ABGs: No results found for: PHART, PO2ART, CWO5MKT, BDR6WGD, BEART, G0FUUBNA     Type & Screen (If Applicable):  No results found for: LABABO, LABRH    Drug/Infectious Status (If Applicable):  No results found for: HIV, HEPCAB    COVID-19 Screening (If Applicable):   Lab Results   Component Value Date    COVID19 Not Detected 08/11/2020         Anesthesia Evaluation  Patient summary reviewed and Nursing notes reviewed no history of anesthetic complications:   Airway: Mallampati: II  TM distance: >3 FB   Neck ROM: full  Mouth opening: > = 3 FB Dental: normal exam         Pulmonary:Negative Pulmonary ROS and normal exam  breath sounds clear to auscultation                             Cardiovascular:  Exercise tolerance: no interval change,           Rhythm: regular  Rate: normal           Beta Blocker:  Not on Beta Blocker         Neuro/Psych:   (+) headaches:,             GI/Hepatic/Renal:   (+) renal disease: kidney stones and no interval change,           Endo/Other:    (+) DiabetesType II DM, no interval change, , : arthritis: OA and no interval change. , . Abdominal:           Vascular: negative vascular ROS. Anesthesia Plan      MAC     ASA 2             Anesthetic plan and risks discussed with patient. Plan discussed with CRNA.                   Frank Elaine MD   8/14/2020

## 2020-08-17 ENCOUNTER — HOSPITAL ENCOUNTER (OUTPATIENT)
Dept: PREADMISSION TESTING | Age: 60
Discharge: HOME | End: 2020-08-21
Payer: COMMERCIAL

## 2020-08-17 ENCOUNTER — HOSPITAL ENCOUNTER (OUTPATIENT)
Dept: PHYSICAL THERAPY | Age: 60
Setting detail: THERAPIES SERIES
Discharge: HOME | End: 2020-08-17
Payer: COMMERCIAL

## 2020-08-17 ENCOUNTER — TELEPHONE (OUTPATIENT)
Dept: PAIN MANAGEMENT | Age: 60
End: 2020-08-17

## 2020-08-17 PROCEDURE — 97110 THERAPEUTIC EXERCISES: CPT

## 2020-08-17 PROCEDURE — U0003 INFECTIOUS AGENT DETECTION BY NUCLEIC ACID (DNA OR RNA); SEVERE ACUTE RESPIRATORY SYNDROME CORONAVIRUS 2 (SARS-COV-2) (CORONAVIRUS DISEASE [COVID-19]), AMPLIFIED PROBE TECHNIQUE, MAKING USE OF HIGH THROUGHPUT TECHNOLOGIES AS DESCRIBED BY CMS-2020-01-R: HCPCS

## 2020-08-17 ASSESSMENT — PAIN DESCRIPTION - PROGRESSION: CLINICAL_PROGRESSION: GRADUALLY IMPROVING

## 2020-08-17 ASSESSMENT — PAIN SCALES - GENERAL: PAINLEVEL_OUTOF10: 0

## 2020-08-17 ASSESSMENT — PAIN DESCRIPTION - FREQUENCY: FREQUENCY: INTERMITTENT

## 2020-08-17 NOTE — PROGRESS NOTES
Physical Therapy  Daily Treatment Note  Date: 2020  Patient Name: Shan Campbell  MRN: [id number]     :   1960    Subjective:   General  Chart Reviewed: Yes  Additional Pertinent Hx:  stimulator removed so he can have a MRI nd low back pain got worst, R sarah pain started  Family / Caregiver Present: No  Referring Practitioner: Dr Steven Ralph  PT Visit Information  Onset Date: 18  PT Insurance Information: cigna/MMO  Total # of Visits Approved: 20  Total # of Visits to Date: 16  No Show: 0  Canceled Appointment: 1  Subjective  Subjective: Patient reports today feeling well overall. Noted cont pain at end ranges only. Pain Screening  Patient Currently in Pain: No  Pain Assessment  Pain Assessment: 0-10  Pain Level: 0  Patient's Stated Pain Goal: No pain  Pain Frequency: Intermittent  Clinical Progression: Gradually improving  Vital Signs  Patient Currently in Pain: No       Treatment Activities:      Exercises  Exercise 1: B elbow curls 35# 3x10  Exercise 2: B elbow ext 35# 3x10  Exercise 3: chest press front/ back 35# 3x10  Exercise 4: lat pull down 35# 3x10  Exercise 5: ball on wall 10x10\"  Exercise 6: saad's R 4 way 3# 10x3  Exercise 7: R sarah ER in L S/L 3# 3x10  Exercise 8: supine R sarah protraction 5# 3x10  Exercise 9: sarah R flex/ext/scaption 2# 15x2  Exercise 10: prone R sarah lawnmower pull 5# 3x10  Exercise 11: prone R sarah ext 5# 3x10  Exercise 12: 45# rows/pull down 3x10  Exercise 15: Horizontal Abduction/ B ER 2x10 orange band  Exercise 16: ABC's 5# 1x  Exercise 17: Wall Walks with Band 10x Orange  Exercise 18: Ball on wall for scapular stabilization 20x ea up/down, side/side, CW, CCW      Assessment:   Conditions Requiring Skilled Therapeutic Intervention  Body structures, Functions, Activity limitations: Decreased functional mobility ; Decreased ADL status; Decreased ROM; Decreased strength; Increased pain  Assessment: Cont with exercises per chart for R shoulder strengthening with most recent progressions made prev session. Patient with only muscle fatigue post exercise with no change in pain.   Treatment Diagnosis: stiffness R sarah M25.611 weakness R sarah M62.511  Prognosis: Good  Decision Making: Low Complexity  REQUIRES PT FOLLOW UP: Yes      Goals:  Short term goals  Time Frame for Short term goals: 6 visits  Short term goal 1: decrease pain  R sarah by 50% so patient can do ADL easier  Short term goal 2: increase AROM R sarah to full  Short term goal 3: increase strength R scap muscles by 1/2 grade or better  Short term goal 4: indep with HEP  Long term goals  Time Frame for Long term goals : 12 visits  Long term goal 1: improve UEFS from 76 to 66 or better  Patient Goals   Patient goals : no pain    Plan:    Plan  Times per week: 2x/week  Plan weeks: 6 weeks  Specific instructions for Next Treatment: progress with DLSP/R scap strengthening ex  Current Treatment Recommendations: Strengthening, ROM, Home Exercise Program  Timed Code Treatment Minutes: 40 Minutes     Therapy Time   Individual Concurrent Group Co-treatment   Time In 1444         Time Out 1525         Minutes 41         Timed Code Treatment Minutes: 40 Minutes      Treatment Charges: Minutes Units   []  Ultrasound     []  Electrical-Stim     []  Iontophoresis     []  Traction     []  Massage       []  Eval     []  Gait     [x]  Ther Exercise 40 3   []  Manual Therapy       []  Ther Activities       []  Aquatics     []  Vasopneumatic Device     []  Neuro Re-Ed       []  Other       Total Treatment Time: 40 3           Ashly Archer PTA

## 2020-08-17 NOTE — TELEPHONE ENCOUNTER
Patients pain was a 9 out of 10 prior to the procedure and a 3 out 10 after the procedure with walking, sitting and gardening. He states it was 80% relief and successful and would like the next procedure.

## 2020-08-19 ENCOUNTER — APPOINTMENT (OUTPATIENT)
Dept: PHYSICAL THERAPY | Age: 60
End: 2020-08-19
Payer: COMMERCIAL

## 2020-08-19 LAB — SARS-COV-2, NAA: NOT DETECTED

## 2020-08-20 ENCOUNTER — ANESTHESIA EVENT (OUTPATIENT)
Dept: OPERATING ROOM | Age: 60
End: 2020-08-20
Payer: COMMERCIAL

## 2020-08-21 ENCOUNTER — ANESTHESIA (OUTPATIENT)
Dept: OPERATING ROOM | Age: 60
End: 2020-08-21
Payer: COMMERCIAL

## 2020-08-21 ENCOUNTER — HOSPITAL ENCOUNTER (OUTPATIENT)
Age: 60
Setting detail: OUTPATIENT SURGERY
Discharge: HOME | End: 2020-08-21
Attending: PAIN MEDICINE | Admitting: PAIN MEDICINE
Payer: COMMERCIAL

## 2020-08-21 ENCOUNTER — APPOINTMENT (OUTPATIENT)
Dept: GENERAL RADIOLOGY | Age: 60
End: 2020-08-21
Attending: PAIN MEDICINE
Payer: COMMERCIAL

## 2020-08-21 VITALS
WEIGHT: 193 LBS | TEMPERATURE: 98.4 F | SYSTOLIC BLOOD PRESSURE: 125 MMHG | BODY MASS INDEX: 29.25 KG/M2 | DIASTOLIC BLOOD PRESSURE: 85 MMHG | HEIGHT: 68 IN | RESPIRATION RATE: 21 BRPM | HEART RATE: 70 BPM | OXYGEN SATURATION: 96 %

## 2020-08-21 VITALS
SYSTOLIC BLOOD PRESSURE: 126 MMHG | OXYGEN SATURATION: 100 % | RESPIRATION RATE: 23 BRPM | DIASTOLIC BLOOD PRESSURE: 74 MMHG

## 2020-08-21 PROCEDURE — 2500000003 HC RX 250 WO HCPCS: Performed by: PAIN MEDICINE

## 2020-08-21 PROCEDURE — 3209999900 FLUORO FOR SURGICAL PROCEDURES

## 2020-08-21 PROCEDURE — 7100000011 HC PHASE II RECOVERY - ADDTL 15 MIN: Performed by: PAIN MEDICINE

## 2020-08-21 PROCEDURE — 6360000002 HC RX W HCPCS: Performed by: NURSE ANESTHETIST, CERTIFIED REGISTERED

## 2020-08-21 PROCEDURE — 64493 INJ PARAVERT F JNT L/S 1 LEV: CPT | Performed by: PAIN MEDICINE

## 2020-08-21 PROCEDURE — 3600000002 HC SURGERY LEVEL 2 BASE: Performed by: PAIN MEDICINE

## 2020-08-21 PROCEDURE — 7100000010 HC PHASE II RECOVERY - FIRST 15 MIN: Performed by: PAIN MEDICINE

## 2020-08-21 PROCEDURE — 64494 INJ PARAVERT F JNT L/S 2 LEV: CPT | Performed by: PAIN MEDICINE

## 2020-08-21 PROCEDURE — 3700000000 HC ANESTHESIA ATTENDED CARE: Performed by: PAIN MEDICINE

## 2020-08-21 PROCEDURE — 2709999900 HC NON-CHARGEABLE SUPPLY: Performed by: PAIN MEDICINE

## 2020-08-21 RX ORDER — LABETALOL 20 MG/4 ML (5 MG/ML) INTRAVENOUS SYRINGE
5 EVERY 10 MIN PRN
Status: DISCONTINUED | OUTPATIENT
Start: 2020-08-21 | End: 2020-08-21 | Stop reason: HOSPADM

## 2020-08-21 RX ORDER — MIDAZOLAM HYDROCHLORIDE 1 MG/ML
INJECTION INTRAMUSCULAR; INTRAVENOUS PRN
Status: DISCONTINUED | OUTPATIENT
Start: 2020-08-21 | End: 2020-08-21 | Stop reason: SDUPTHER

## 2020-08-21 RX ORDER — BUPIVACAINE HYDROCHLORIDE 2.5 MG/ML
INJECTION, SOLUTION INFILTRATION; PERINEURAL PRN
Status: DISCONTINUED | OUTPATIENT
Start: 2020-08-21 | End: 2020-08-21 | Stop reason: ALTCHOICE

## 2020-08-21 RX ORDER — LIDOCAINE HYDROCHLORIDE 10 MG/ML
1 INJECTION, SOLUTION EPIDURAL; INFILTRATION; INTRACAUDAL; PERINEURAL
Status: DISCONTINUED | OUTPATIENT
Start: 2020-08-21 | End: 2020-08-21 | Stop reason: HOSPADM

## 2020-08-21 RX ORDER — SODIUM CHLORIDE 0.9 % (FLUSH) 0.9 %
10 SYRINGE (ML) INJECTION PRN
Status: DISCONTINUED | OUTPATIENT
Start: 2020-08-21 | End: 2020-08-21 | Stop reason: HOSPADM

## 2020-08-21 RX ORDER — DIPHENHYDRAMINE HYDROCHLORIDE 50 MG/ML
12.5 INJECTION INTRAMUSCULAR; INTRAVENOUS
Status: DISCONTINUED | OUTPATIENT
Start: 2020-08-21 | End: 2020-08-21 | Stop reason: HOSPADM

## 2020-08-21 RX ORDER — SODIUM CHLORIDE 0.9 % (FLUSH) 0.9 %
10 SYRINGE (ML) INJECTION EVERY 12 HOURS SCHEDULED
Status: DISCONTINUED | OUTPATIENT
Start: 2020-08-21 | End: 2020-08-21 | Stop reason: HOSPADM

## 2020-08-21 RX ORDER — OXYCODONE HYDROCHLORIDE AND ACETAMINOPHEN 5; 325 MG/1; MG/1
1 TABLET ORAL PRN
Status: DISCONTINUED | OUTPATIENT
Start: 2020-08-21 | End: 2020-08-21 | Stop reason: HOSPADM

## 2020-08-21 RX ORDER — PROMETHAZINE HYDROCHLORIDE 25 MG/ML
12.5 INJECTION, SOLUTION INTRAMUSCULAR; INTRAVENOUS
Status: DISCONTINUED | OUTPATIENT
Start: 2020-08-21 | End: 2020-08-21 | Stop reason: HOSPADM

## 2020-08-21 RX ORDER — 0.9 % SODIUM CHLORIDE 0.9 %
500 INTRAVENOUS SOLUTION INTRAVENOUS
Status: DISCONTINUED | OUTPATIENT
Start: 2020-08-21 | End: 2020-08-21 | Stop reason: HOSPADM

## 2020-08-21 RX ORDER — SODIUM CHLORIDE, SODIUM LACTATE, POTASSIUM CHLORIDE, CALCIUM CHLORIDE 600; 310; 30; 20 MG/100ML; MG/100ML; MG/100ML; MG/100ML
INJECTION, SOLUTION INTRAVENOUS CONTINUOUS
Status: DISCONTINUED | OUTPATIENT
Start: 2020-08-21 | End: 2020-08-21 | Stop reason: HOSPADM

## 2020-08-21 RX ORDER — OXYCODONE HYDROCHLORIDE AND ACETAMINOPHEN 5; 325 MG/1; MG/1
2 TABLET ORAL PRN
Status: DISCONTINUED | OUTPATIENT
Start: 2020-08-21 | End: 2020-08-21 | Stop reason: HOSPADM

## 2020-08-21 RX ORDER — MEPERIDINE HYDROCHLORIDE 50 MG/ML
12.5 INJECTION INTRAMUSCULAR; INTRAVENOUS; SUBCUTANEOUS EVERY 5 MIN PRN
Status: DISCONTINUED | OUTPATIENT
Start: 2020-08-21 | End: 2020-08-21 | Stop reason: HOSPADM

## 2020-08-21 RX ORDER — ONDANSETRON 2 MG/ML
4 INJECTION INTRAMUSCULAR; INTRAVENOUS
Status: DISCONTINUED | OUTPATIENT
Start: 2020-08-21 | End: 2020-08-21 | Stop reason: HOSPADM

## 2020-08-21 RX ADMIN — MIDAZOLAM HYDROCHLORIDE 2 MG: 1 INJECTION, SOLUTION INTRAMUSCULAR; INTRAVENOUS at 13:27

## 2020-08-21 ASSESSMENT — PULMONARY FUNCTION TESTS
PIF_VALUE: 0

## 2020-08-21 ASSESSMENT — PAIN DESCRIPTION - DESCRIPTORS: DESCRIPTORS: ACHING;DULL;CONSTANT

## 2020-08-21 ASSESSMENT — PAIN SCALES - GENERAL
PAINLEVEL_OUTOF10: 0

## 2020-08-21 ASSESSMENT — PAIN - FUNCTIONAL ASSESSMENT
PAIN_FUNCTIONAL_ASSESSMENT: PREVENTS OR INTERFERES SOME ACTIVE ACTIVITIES AND ADLS
PAIN_FUNCTIONAL_ASSESSMENT: 0-10

## 2020-08-21 NOTE — OP NOTE
Lumbar Facet Nerve Block Injection:  Surgeon: Don Pritchard     PRE-OP DIAGNOSIS: M47.817 (lumbosacral spondylosis), M54.5 (low back pain)    POST-OP DIAGNOSIS: Same. PROCEDURE PERFORMED: Lumbar Facet Nerve Block Multiple Levels  Bilateral L3 - 4 and L4 - 5. Physician confirmed and marked the surgical site. EBL: minimal      CONSENT: Patient has undergone the educational process with this procedure, is aware and fully understands the risks involved: potential damage to any and all body organs including possible bleeding, infection and nerve injury, allergic reaction and headache. Patient also understands that the procedure will be undertaken in a safe, controlled, and monitored setting. Patient recognizes that the benefits include relief from pain and reduction in the oral use of medications. Patient agreed to proceed. The patient was counseled at length about the risks of zuleyka Covid-19 during their perioperative period and any recovery window from their procedure. The patient was made aware that zuleyka Covid-19  may worsen their prognosis for recovering from their procedure  and lend to a higher morbidity and/or mortality risk. All material risks, benefits, and reasonable alternatives including postponing the procedure were discussed. The patient does wish to proceed with the procedure at this time. PREP: Timeout was performed prior to starting the procedure. The patient's back was prepped with chloroprep and draped appropriately. 5ml of 0.5% lidocaine was used to anesthetize the skin and subcutaneous tissue. PROCEDURE NOTE: A 22 gauge 3.5 inch spinal needle was advanced under  fluoroscopic guidance to the appropriate anatomic location for the medial branches corresponding to the facets at the base of the appropriate superior articular process and/or sacral ala . Aspiration was negative for blood, CSF and producing pain.  1 ml of 0.25% marcaine was then injected at each site to

## 2020-08-21 NOTE — ANESTHESIA POSTPROCEDURE EVALUATION
Department of Anesthesiology  Postprocedure Note    Patient: Ramu Tapia  MRN: [id number]  YOB: 1960  Date of evaluation: 8/21/2020  Time:  1:44 PM     Procedure Summary     Date:  08/21/20 Room / Location:  [address] [place] [address] N / [address] [facility]    Anesthesia Start:  9049 Anesthesia Stop:  1150    Procedure:  NERVE BLOCK - MBB #2 L3-4 L4-5 (Bilateral ) Diagnosis:  (SPONDYLOSIS WITHOUT MYELOPATHY)    Surgeon:  Fidel Rock MD Responsible Provider:  PAUL Romo CRNA    Anesthesia Type:  MAC ASA Status:  2          Anesthesia Type: MAC    Zara Phase I:      Zara Phase II: Zara Score: 10    Last vitals: Reviewed and per EMR flowsheets.        Anesthesia Post Evaluation    Patient location during evaluation: PACU  Patient participation: complete - patient participated  Level of consciousness: awake and alert  Airway patency: patent  Nausea & Vomiting: no nausea and no vomiting  Complications: no  Cardiovascular status: hemodynamically stable  Respiratory status: nasal cannula and spontaneous ventilation  Hydration status: euvolemic

## 2020-08-21 NOTE — ANESTHESIA PRE PROCEDURE
Department of Anesthesiology  Preprocedure Note       Name:  Shan Campbell   Age:  64 y.o.  :  1960                                          MRN:  [id number]         Date:  2020      Surgeon: Fernie Barakat):  Stas Bates MD    Procedure: Procedure(s):  NERVE BLOCK - MBB #2 L3-4 L4-5    Medications prior to admission:   Prior to Admission medications    Medication Sig Start Date End Date Taking? Authorizing Provider   HYDROcodone-acetaminophen (NORCO) 5-325 MG per tablet Take 1 tablet by mouth every 4 hours as needed for Pain for up to 30 days. 20 Yes PAUL Bishop CNP   ondansetron (ZOFRAN) 4 MG tablet TAKE ONE TABLET BY MOUTH EVERY 8 HOURS AS NEEDED FOR NAUSEA 20  Yes PAUL Bishop CNP   Handicap Placard [facility] It is my medical opinion that Amarilys Barber requires a disability parking placard for the following reasons:  He has limited walking ability due to an arthritic condition. Duration of need: permanent 20   PAUL Bishop CNP       Current medications:    Current Facility-Administered Medications   Medication Dose Route Frequency Provider Last Rate Last Dose    lactated ringers infusion   Intravenous Continuous Pooja Fairchild MD        sodium chloride flush 0.9 % injection 10 mL  10 mL Intravenous 2 times per day Pooja Fairchild MD        sodium chloride flush 0.9 % injection 10 mL  10 mL Intravenous PRN Pooja Fairchild MD        lidocaine PF 1 % injection 1 mL  1 mL Intradermal Once PRN Pooja Fairchild MD           Allergies:     Allergies   Allergen Reactions    Morphine Other (See Comments)     Patient developed suicidal ideations    Fentanyl Hives       Problem List:    Patient Active Problem List   Diagnosis Code    Chronic back pain M54.9, G89.29       Past Medical History:        Diagnosis Date    Chronic back pain     Headache(784.0)     Kidney stones     Osteoarthritis        Past Surgical History:        Procedure Component Value Date     03/02/2019    K 4.1 03/02/2019     03/02/2019    CO2 22 03/02/2019    BUN 14 03/02/2019    CREATININE 0.76 03/02/2019    GFRAA >60 03/02/2019    LABGLOM >60 03/02/2019    GLUCOSE 131 03/02/2019    PROT 6.9 03/02/2019    CALCIUM 9.1 03/02/2019    BILITOT 0.66 03/02/2019    ALKPHOS 64 03/02/2019    AST 17 03/02/2019    ALT 23 03/02/2019       POC Tests: No results for input(s): POCGLU, POCNA, POCK, POCCL, POCBUN, POCHEMO, POCHCT in the last 72 hours. Coags: No results found for: PROTIME, INR, APTT    HCG (If Applicable): No results found for: PREGTESTUR, PREGSERUM, HCG, HCGQUANT     ABGs: No results found for: PHART, PO2ART, RMQ6UWB, HTD5DHH, BEART, N8EIULXE     Type & Screen (If Applicable):  No results found for: LABABO, LABRH    Drug/Infectious Status (If Applicable):  No results found for: HIV, HEPCAB    COVID-19 Screening (If Applicable):   Lab Results   Component Value Date    COVID19 Not Detected 08/17/2020         Anesthesia Evaluation  Patient summary reviewed and Nursing notes reviewed  Airway: Mallampati: III  TM distance: >3 FB   Neck ROM: full  Mouth opening: > = 3 FB Dental: normal exam         Pulmonary:Negative Pulmonary ROS and normal exam                               Cardiovascular:Negative CV ROS                      Neuro/Psych:   Negative Neuro/Psych ROS              GI/Hepatic/Renal: Neg GI/Hepatic/Renal ROS            Endo/Other:    (+) : arthritis:., .                  ROS comment: -NPO AFTER MIDNIGHT  -ALLERGIES - MORPHINE, FENTANYL Abdominal:           Vascular: negative vascular ROS. Anesthesia Plan      MAC     ASA 2       Induction: intravenous. MIPS: Postoperative opioids intended and Prophylactic antiemetics administered. Anesthetic plan and risks discussed with patient. Plan discussed with CRNA.     Attending anesthesiologist reviewed and agrees with Pre Eval content              Katherine Davison, MD   8/21/2020

## 2020-08-21 NOTE — H&P
Pain Pre-Op H&P Note    Tyshawn Pritchard    HPI: Prachi Dhaliwal  presents with Low back pain, did well with diagnostic facets, here for confirmatory block.       Past Medical History:   Diagnosis Date    Chronic back pain     Headache(784.0)     Kidney stones     Osteoarthritis        Past Surgical History:   Procedure Laterality Date    ANESTHESIA NERVE BLOCK N/A 8/14/2020    NERVE BLOCK - MBB # 1 L3-4, L4-5 performed by Abdi Manrique MD at [address] [facility]  2012    LITHOTRIPSY  08/2018    NERVE BLOCK  08/14/2020    NERVE BLOCK - MBB # 1 L3-4, L4-5 (N/A )     OTHER SURGICAL HISTORY      spinal stimulator placement 6-7 years ago   [facility] SURGERY  01/30/2020    removal    SPINAL CORD STIMULATOR SURGERY N/A 1/30/2020    SPINAL CORD STIMULATOR IMPLANT REMOVAL, performed by Janice Hernandez DO at [address]      laminectomy X2 and Spinal Cord stimulartor implant    URETER STENT PLACEMENT         Family History   Problem Relation Age of Onset    Diabetes Mother     High Blood Pressure Mother     Diabetes Father     Glaucoma Father     Eczema Father        Allergies   Allergen Reactions    Morphine Other (See Comments)     Patient developed suicidal ideations    Fentanyl Hives         Current Facility-Administered Medications:     lactated ringers infusion, , Intravenous, Continuous, Alex Dawn MD    sodium chloride flush 0.9 % injection 10 mL, 10 mL, Intravenous, 2 times per day, Alex Dawn MD    sodium chloride flush 0.9 % injection 10 mL, 10 mL, Intravenous, PRN, Alex Dawn MD    lidocaine PF 1 % injection 1 mL, 1 mL, Intradermal, Once PRN, Alex Dawn MD    HYDROmorphone (DILAUDID) injection 0.5 mg, 0.5 mg, Intravenous, Q5 Min PRN, Alex Dawn MD    HYDROmorphone (DILAUDID) injection 0.25 mg, 0.25 mg, Intravenous, Q5 Min PRN, Alex Dawn MD    HYDROmorphone (DILAUDID) injection 0.5 mg, 0.5 mg, Intravenous, Q5 Min PRN, Sonya Negrete MD    oxyCODONE-acetaminophen (PERCOCET) 5-325 MG per tablet 1 tablet, 1 tablet, Oral, PRN **OR** oxyCODONE-acetaminophen (PERCOCET) 5-325 MG per tablet 2 tablet, 2 tablet, Oral, PRN, Sonya Negrete MD    diphenhydrAMINE (BENADRYL) injection 12.5 mg, 12.5 mg, Intravenous, Once PRN, Sonya Negrete MD    0.9 % sodium chloride bolus, 500 mL, Intravenous, Once PRN, Sonya Negrete MD    ondansetron (ZOFRAN) injection 4 mg, 4 mg, Intravenous, Once PRN, Sonya Negrete MD    promethazine (PHENERGAN) injection 12.5 mg, 12.5 mg, Intravenous, Q15 Min PRN, Sonya Negrete MD    labetalol (NORMODYNE;TRANDATE) injection syringe 5 mg, 5 mg, Intravenous, Q10 Min PRN, Sonya Negrete MD    meperidine (DEMEROL) injection 12.5 mg, 12.5 mg, Intravenous, Q5 Min PRN, Sonya Negrete MD    HYDROmorphone (DILAUDID) injection 0.25 mg, 0.25 mg, Intravenous, Q5 Min PRN, Sonya Negrete MD    Social History     Tobacco Use    Smoking status: Never Smoker    Smokeless tobacco: Never Used   Substance Use Topics    Alcohol use: Yes     Comment: rarely       Review of Systems:   Focused review of systems was performed, and negative as pertinent to diagnosis, except as stated in HPI. Physical Exam:     /82   Pulse 65   Temp 97 °F (36.1 °C) (Temporal)   Resp 20   Ht 5' 8\" (1.727 m)   Wt 193 lb (87.5 kg)   SpO2 98%   BMI 29.35 kg/m²     Physical Exam  Vitals signs reviewed. Musculoskeletal:      Lumbar back: He exhibits tenderness. He exhibits no edema and no deformity. Neurological:      Mental Status: He is alert and oriented to person, place, and time.           Patient's current physical status, medications, medical history, and HPI have been reviewed and updated as appropriate on this date: 08/21/20    Risk/Benefit(s): The risks, benefits, alternatives, and potential complications have been discussed with the patient/family and informed consent has been obtained for the procedure/sedation.     Diagnosis:   SPONDYLOSIS WITHOUT MYELOPATHY      Plan: lumbar MBB        [address]

## 2020-08-24 ENCOUNTER — TELEPHONE (OUTPATIENT)
Dept: PAIN MANAGEMENT | Age: 60
End: 2020-08-24

## 2020-08-24 ENCOUNTER — HOSPITAL ENCOUNTER (OUTPATIENT)
Dept: PHYSICAL THERAPY | Age: 60
Setting detail: THERAPIES SERIES
Discharge: HOME | End: 2020-08-24
Payer: COMMERCIAL

## 2020-08-24 PROCEDURE — 97110 THERAPEUTIC EXERCISES: CPT

## 2020-08-24 ASSESSMENT — PAIN SCALES - GENERAL: PAINLEVEL_OUTOF10: 0

## 2020-08-24 ASSESSMENT — PAIN DESCRIPTION - PROGRESSION: CLINICAL_PROGRESSION: GRADUALLY IMPROVING

## 2020-08-24 ASSESSMENT — PAIN DESCRIPTION - FREQUENCY: FREQUENCY: INTERMITTENT

## 2020-08-24 NOTE — PROGRESS NOTES
Physical Therapy  Daily Treatment Note  Date: 2020  Patient Name: Jas Norris  MRN: [id number]     :   1960    Subjective:   General  Chart Reviewed: Yes  Additional Pertinent Hx:  stimulator removed so he can have a MRI nd low back pain got worst, R sarah pain started  Family / Caregiver Present: No  Referring Practitioner: Dr Norma Ochoa  PT Visit Information  Onset Date: 18  PT Insurance Information: cigna/MMO  Total # of Visits Approved: 20  Total # of Visits to Date: 17  No Show: 0  Canceled Appointment: 1  Subjective  Subjective: Pateint reports today with no new complaints. Noted no changes since prev session. Pain Screening  Patient Currently in Pain: No  Pain Assessment  Pain Assessment: 0-10  Pain Level: 0  Patient's Stated Pain Goal: No pain  Pain Frequency: Intermittent  Clinical Progression: Gradually improving  Vital Signs  Patient Currently in Pain: No       Treatment Activities:     Exercises  Exercise 1: B elbow curls 35# 3x10  Exercise 2: B elbow ext 45# 3x10  Exercise 3: chest press front/ back 35# 3x10  Exercise 4: lat pull down 35# 3x10  Exercise 6: saad's R 4 way 3# 10x3  Exercise 7: R sarah ER in L S/L 3# 3x10  Exercise 8: supine R sarah protraction 5# 3x10  Exercise 9: sarah R flex/ext/scaption 3# 15x2  Exercise 10: prone R sarah lawnmower pull 5# 3x10  Exercise 11: prone R sarah ext 5# 3x10  Exercise 12: 45# rows/pull down 3x10  Exercise 15: Horizontal Abduction/ B ER 2x10 orange band  Exercise 16: ABC's 5# 1x  Exercise 17: Wall Walks with Band 10x2 Orange  Exercise 18: Ball on wall for scapular stabilization 20x ea up/down, side/side, CW, CCW 2#  Exercise 19: UBE 5 min L1     Assessment:   Conditions Requiring Skilled Therapeutic Intervention  Body structures, Functions, Activity limitations: Decreased functional mobility ; Decreased ADL status; Decreased ROM; Decreased strength; Increased pain  Assessment: Cont with exercises per chart for R shoulder strengthening.  Initiated treatment on UBE this date for active warmup before strengthening exercises. Progressed weight of most PRE's to increase strengthening potential of exercise. Patient with good tolerance to progresions made this date, noting only muscle soreness post exericse.   Treatment Diagnosis: stiffness R sarah M25.611 weakness R sarah M62.511  Prognosis: Good  Decision Making: Low Complexity  REQUIRES PT FOLLOW UP: Yes  Discharge Recommendations: Home independently      Goals:  Short term goals  Time Frame for Short term goals: 6 visits  Short term goal 1: decrease pain  R sarah by 50% so patient can do ADL easier  Short term goal 2: increase AROM R sarah to full  Short term goal 3: increase strength R scap muscles by 1/2 grade or better  Short term goal 4: indep with HEP  Long term goals  Time Frame for Long term goals : 12 visits  Long term goal 1: improve UEFS from 76 to 66 or better  Patient Goals   Patient goals : no pain    Plan:    Plan  Times per week: 2x/week  Plan weeks: 6 weeks  Specific instructions for Next Treatment: progress with DLSP/R scap strengthening ex  Current Treatment Recommendations: Strengthening, ROM, Home Exercise Program  Timed Code Treatment Minutes: 40 Minutes     Therapy Time   Individual Concurrent Group Co-treatment   Time In 0915         Time Out 1000         Minutes 45         Timed Code Treatment Minutes: 65 [facility]       Ashutosh Allen PTA

## 2020-08-24 NOTE — TELEPHONE ENCOUNTER
Patients pain was 8 out of 10 prior to procedure and 0 out of 10 for 4 hours after the procedure with 100% relief. Activities included walking, sitting and gardening. He feels this was successful and would like to proceed with the next procedure.

## 2020-08-27 ENCOUNTER — HOSPITAL ENCOUNTER (OUTPATIENT)
Dept: PHYSICAL THERAPY | Age: 60
Setting detail: THERAPIES SERIES
Discharge: HOME | End: 2020-08-27
Payer: COMMERCIAL

## 2020-08-27 PROCEDURE — 97110 THERAPEUTIC EXERCISES: CPT

## 2020-08-27 NOTE — PROGRESS NOTES
Physical Therapy Re-evaluation Note    Date: 2020  Patient Name: Sandra Kenny  MRN: [id number]  : 1960     Treatment Diagnosis: stiffness R sarah M25.611 weakness R sarah M62.511    Subjective   General  Referring Practitioner: Dr Janet Rowland  Referral Date : 20  Diagnosis: tear R rotator cuff M75.101 lumbar spondylosis with radiculopathy  Follows Commands: Within Functional Limits  PT Visit Information  Onset Date: 18  PT Insurance Information: cigna/MMO  Total # of Visits Approved: 20  Total # of Visits to Date: 18  Subjective  Subjective: no complains of pain on R sarah  Pain Screening  Patient Currently in Pain: No  Vital Signs  Patient Currently in Pain: No    Objective  AROM RUE (degrees)  R Shoulder Flexion 0-180: 0-160  R Shoulder ABduction 0-180: 0-160  R Shoulder Int Rotation  0-70: 0-40  R Shoulder Ext Rotation 0-90: 0-90  Strength RUE  Comment: sarah 5/5 scap 4-/5 distal 5/5     Exercises  Exercise 1: B elbow curls 35# 3x10  Exercise 2: B elbow ext 45# 3x10  Exercise 3: chest press front/ back 35# 3x10  Exercise 4: lat pull down 35# 3x10  Exercise 5: ball on wall 10x10\"  Exercise 6: saad's R 4 way 3# 10x3  Exercise 7: R sarah ER in L S/L 3# 3x10  Exercise 8: supine R sarah protraction 5# 3x10  Exercise 9: sarah R flex/ext/scaption 3# 15x2  Exercise 10: prone R sarah lawnmower pull 5# 3x10  Exercise 11: prone R sarah ext 5# 3x10  Exercise 12: 45# rows/pull down 3x10  Exercise 15: Horizontal Abduction/ B ER 2x10 orange band  Exercise 16: ABC's 5# 1x  Exercise 17: Wall Walks with Band 10x2 Orange  Exercise 18:  Harjinder Dmitry on wall for scapular stabilization 20x ea up/down, side/side, CW, CCW 2#  Exercise 19: UBE 5 min L1    Assessment   Conditions Requiring Skilled Therapeutic Intervention  Assessment: SHORT TERM GOALS MET 3/4 LONG TERM GOAL MET   Treatment Diagnosis: stiffness R sarah M25.611 weakness R sarah M62.511  REQUIRES PT FOLLOW UP: Yes  Discharge Recommendations: Home independently  Activity Tolerance  Activity Tolerance: Patient Tolerated treatment well         Plan   Plan  Current Treatment Recommendations: Strengthening, ROM, Home Exercise Program  Plan Comment: TO FINISH LAST 2 PT VISITS NEXT WEEK THEN DISCONTINUE PT WITH PATIENT TO CONTINUE HEP    OutComes Score  UEFS Score: 87.5 (08/27/20 0815)    Goals  Short term goals  Short term goal 1: decrease pain  R sarah by 50% so patient can do ADL easier(met)  Short term goal 2: increase AROM R sarah to full(partly met)  Short term goal 3: increase strength R scap muscles by 1/2 grade or better(met)  Short term goal 4: indep with HEP(met)  Long term goals  Long term goal 1: improve UEFS from 76 to 66 or better(met UEFS 88)     Treatment Charges: Minutes Units   []  Ultrasound     []  Electrical-Stim     []  Iontophoresis     []  Traction     []  Massage       []  Eval     []  Gait     [x]  Ther Exercise 45  3    []  Manual Therapy       []  Ther Activities       []  Aquatics     []  Vasopneumatic Device     []  Neuro Re-Ed       []  Other       Total Treatment Time: 45 3        Therapy Time   Individual Concurrent Group Co-treatment   Time In 0815         Time Out 0900         Minutes 45         Timed Code Treatment Minutes: 39 Minutes    Electronically signed by: Katelyn Joseph, PT

## 2020-08-31 ENCOUNTER — HOSPITAL ENCOUNTER (OUTPATIENT)
Dept: PHYSICAL THERAPY | Age: 60
Setting detail: THERAPIES SERIES
Discharge: HOME | End: 2020-08-31
Payer: COMMERCIAL

## 2020-08-31 ENCOUNTER — HOSPITAL ENCOUNTER (OUTPATIENT)
Dept: PREADMISSION TESTING | Age: 60
Setting detail: SPECIMEN
Discharge: HOME | End: 2020-09-04
Payer: COMMERCIAL

## 2020-08-31 PROCEDURE — U0003 INFECTIOUS AGENT DETECTION BY NUCLEIC ACID (DNA OR RNA); SEVERE ACUTE RESPIRATORY SYNDROME CORONAVIRUS 2 (SARS-COV-2) (CORONAVIRUS DISEASE [COVID-19]), AMPLIFIED PROBE TECHNIQUE, MAKING USE OF HIGH THROUGHPUT TECHNOLOGIES AS DESCRIBED BY CMS-2020-01-R: HCPCS

## 2020-08-31 PROCEDURE — 97110 THERAPEUTIC EXERCISES: CPT

## 2020-08-31 ASSESSMENT — PAIN SCALES - GENERAL: PAINLEVEL_OUTOF10: 0

## 2020-08-31 ASSESSMENT — PAIN DESCRIPTION - FREQUENCY: FREQUENCY: INTERMITTENT

## 2020-08-31 ASSESSMENT — PAIN DESCRIPTION - PROGRESSION: CLINICAL_PROGRESSION: GRADUALLY IMPROVING

## 2020-08-31 NOTE — PROGRESS NOTES
Physical Therapy  Daily Treatment Note  Date: 2020  Patient Name: Alba Rivers  MRN: [id number]     :   1960    Subjective:   General  Chart Reviewed: Yes  Additional Pertinent Hx:  stimulator removed so he can have a MRI nd low back pain got worst, R sarah pain started  Family / Caregiver Present: No  Referring Practitioner: Dr Kanika Thao  PT Visit Information  Onset Date: 18  PT Insurance Information: cigna/MMO  Total # of Visits Approved: 20  Total # of Visits to Date: 19  No Show: 0  Canceled Appointment: 1  Subjective  Subjective: Patient reports today with no new complaints. Noted no pain in shoulder as long as he limits activity. Pain Screening  Patient Currently in Pain: No  Pain Assessment  Pain Assessment: 0-10  Pain Level: 0  Patient's Stated Pain Goal: No pain  Pain Frequency: Intermittent  Clinical Progression: Gradually improving  Vital Signs  Patient Currently in Pain: No       Treatment Activities:     Exercises  Exercise 1: B elbow curls 35# 3x10  Exercise 2: B elbow ext 55# 3x10  Exercise 3: chest press front/ back 35# 3x10  Exercise 4: lat pull down 45# 3x10  Exercise 6: saad's R 4 way 3# 10x3  Exercise 7: R sarah ER in L S/L 3# 3x10  Exercise 8: supine R sarah protraction 5# 3x10  Exercise 9: sarah R flex/ext/scaption 3# 15x2  Exercise 10: prone R sarah lawnmower pull 5# 3x10  Exercise 11: prone R sarah ext 5# 3x10  Exercise 12: 55# rows/pull down 3x10  Exercise 15: Horizontal Abduction/ B ER 2x10 Lime band  Exercise 16: ABC's 5# 1x  Exercise 17: Wall Walks with Band 10x2 Orange  Exercise 18: Ball on wall for scapular stabilization 20x ea up/down, side/side, CW, CCW 2#  Exercise 19: UBE 5 min L1    Assessment:   Conditions Requiring Skilled Therapeutic Intervention  Body structures, Functions, Activity limitations: Decreased functional mobility ; Decreased ADL status; Decreased ROM; Decreased strength; Increased pain  Assessment: Cont exercise per chart with focus on increasing R

## 2020-09-02 ENCOUNTER — APPOINTMENT (OUTPATIENT)
Dept: PHYSICAL THERAPY | Age: 60
End: 2020-09-02
Payer: COMMERCIAL

## 2020-09-02 LAB — SARS-COV-2, NAA: NOT DETECTED

## 2020-09-03 ENCOUNTER — HOSPITAL ENCOUNTER (OUTPATIENT)
Dept: PHYSICAL THERAPY | Age: 60
Setting detail: THERAPIES SERIES
Discharge: HOME | End: 2020-09-03
Payer: COMMERCIAL

## 2020-09-03 ENCOUNTER — ANESTHESIA EVENT (OUTPATIENT)
Dept: OPERATING ROOM | Age: 60
End: 2020-09-03
Payer: COMMERCIAL

## 2020-09-03 PROCEDURE — 97110 THERAPEUTIC EXERCISES: CPT

## 2020-09-03 ASSESSMENT — PAIN DESCRIPTION - FREQUENCY: FREQUENCY: INTERMITTENT

## 2020-09-03 ASSESSMENT — PAIN DESCRIPTION - ORIENTATION: ORIENTATION: RIGHT

## 2020-09-03 ASSESSMENT — PAIN DESCRIPTION - LOCATION: LOCATION: SHOULDER

## 2020-09-03 ASSESSMENT — PAIN SCALES - GENERAL: PAINLEVEL_OUTOF10: 2

## 2020-09-03 NOTE — PROGRESS NOTES
Physical Therapy Discharge Note    Date: 9/3/2020  Patient Name: Jennifer Oneill  MRN: [id number]  : 1960     Treatment Diagnosis: stiffness R sarah M25.611 weakness R sarah M62.511    Subjective   General  Referring Practitioner: Dr Andrew Sinclair  Referral Date : 20  Diagnosis: tear R rotator cuff M75.101 lumbar spondylosis with radiculopathy  Follows Commands: Within Functional Limits  PT Visit Information  Onset Date: 18  PT Insurance Information: cigna/MMO  Total # of Visits Approved: 20  Total # of Visits to Date: 20  Subjective  Subjective: less pain on R sarah  Pain Screening  Patient Currently in Pain: Yes  Pain Assessment  Pain Assessment: 0-10  Pain Level: 2  Pain Location: Shoulder  Pain Orientation: Right  Pain Frequency: Intermittent  Vital Signs  Patient Currently in Pain: Yes    Objective  AROM RUE (degrees)  R Shoulder Flexion 0-180: 0-160  R Shoulder ABduction 0-180: 0-160  R Shoulder Int Rotation  0-70: 0-40  R Shoulder Ext Rotation 0-90: 0-90  Strength RUE  Comment: sarah 5/5 scap 4-/5 distal 5/5     Exercises  Exercise 1: B elbow curls 35# 3x10  Exercise 2: B elbow ext 55# 3x10  Exercise 3: chest press front/ back 35# 3x10  Exercise 4: lat pull down 45# 3x10  Exercise 5: ball on wall 10x10\"  Exercise 6: saad's R 4 way 3# 10x3  Exercise 7: R sarah ER in L S/L 3# 3x10  Exercise 8: supine R sarah protraction 5# 3x10  Exercise 9: sarah R flex/ext/scaption 3# 15x2  Exercise 10: prone R sarah lawnmower pull 5# 3x10  Exercise 11: prone R sarah ext 5# 3x10  Exercise 12: 55# rows/pull down 3x10  Exercise 16: ABC's 5# 1x  Exercise 17: Wall Walks with Band 10x2 Orange  Exercise 18:  Ball on wall for scapular stabilization 20x ea up/down, side/side, CW, CCW 2#  Exercise 19: UBE 5 min L1    Assessment   Conditions Requiring Skilled Therapeutic Intervention  Assessment: SHORT TERM GOALS MET 4/4 LONG TERM GOAL MET   Treatment Diagnosis: stiffness R sarah M25.611 weakness R sarah M62.511  REQUIRES PT FOLLOW UP: No  Discharge Recommendations: Home independently  Activity Tolerance  Activity Tolerance: Patient Tolerated treatment well         Plan   Plan  Plan Comment: TO DISCONTINUE PT AND PATIENT TO CONTINUE HEP    OutComes Score  UEFS Score: 93.75 (09/03/20 0930)    Goals  Short term goals  Short term goal 1: decrease pain  R sarah by 50% so patient can do ADL easier(met)  Short term goal 2: increase AROM R sarah to full(met)  Short term goal 3: increase strength R scap muscles by 1/2 grade or better(met)  Short term goal 4: indep with HEP(met)  Long term goals  Long term goal 1: improve UEFS from 76 to 66 or better(met UEFS 94)     Treatment Charges: Minutes Units   []  Ultrasound     []  Electrical-Stim     []  Iontophoresis     []  Traction     []  Massage       []  Eval     []  Gait     [x]  Ther Exercise 45  3    []  Manual Therapy       []  Ther Activities       []  Aquatics     []  Vasopneumatic Device     []  Neuro Re-Ed       []  Other       Total Treatment Time: 45 3        Therapy Time   Individual Concurrent Group Co-treatment   Time In 0930         Time Out 1015         Minutes 45         Timed Code Treatment Minutes: 39 Minutes     Electronically signed by: Gaurang Mclean PT

## 2020-09-04 ENCOUNTER — HOSPITAL ENCOUNTER (OUTPATIENT)
Age: 60
Setting detail: OUTPATIENT SURGERY
Discharge: HOME | End: 2020-09-04
Attending: PAIN MEDICINE | Admitting: PAIN MEDICINE
Payer: COMMERCIAL

## 2020-09-04 ENCOUNTER — APPOINTMENT (OUTPATIENT)
Dept: GENERAL RADIOLOGY | Age: 60
End: 2020-09-04
Attending: PAIN MEDICINE
Payer: COMMERCIAL

## 2020-09-04 ENCOUNTER — ANESTHESIA (OUTPATIENT)
Dept: OPERATING ROOM | Age: 60
End: 2020-09-04
Payer: COMMERCIAL

## 2020-09-04 VITALS
OXYGEN SATURATION: 97 % | WEIGHT: 192 LBS | BODY MASS INDEX: 29.1 KG/M2 | TEMPERATURE: 97.6 F | HEIGHT: 68 IN | HEART RATE: 65 BPM | RESPIRATION RATE: 21 BRPM | DIASTOLIC BLOOD PRESSURE: 79 MMHG | SYSTOLIC BLOOD PRESSURE: 124 MMHG

## 2020-09-04 VITALS
DIASTOLIC BLOOD PRESSURE: 73 MMHG | OXYGEN SATURATION: 100 % | RESPIRATION RATE: 24 BRPM | SYSTOLIC BLOOD PRESSURE: 133 MMHG

## 2020-09-04 PROCEDURE — 2500000003 HC RX 250 WO HCPCS: Performed by: PAIN MEDICINE

## 2020-09-04 PROCEDURE — 64635 DESTROY LUMB/SAC FACET JNT: CPT | Performed by: PAIN MEDICINE

## 2020-09-04 PROCEDURE — 3209999900 FLUORO FOR SURGICAL PROCEDURES

## 2020-09-04 PROCEDURE — 2720000010 HC SURG SUPPLY STERILE: Performed by: PAIN MEDICINE

## 2020-09-04 PROCEDURE — 3700000001 HC ADD 15 MINUTES (ANESTHESIA): Performed by: PAIN MEDICINE

## 2020-09-04 PROCEDURE — 3600000002 HC SURGERY LEVEL 2 BASE: Performed by: PAIN MEDICINE

## 2020-09-04 PROCEDURE — 2709999900 HC NON-CHARGEABLE SUPPLY: Performed by: PAIN MEDICINE

## 2020-09-04 PROCEDURE — 3600000012 HC SURGERY LEVEL 2 ADDTL 15MIN: Performed by: PAIN MEDICINE

## 2020-09-04 PROCEDURE — 7100000010 HC PHASE II RECOVERY - FIRST 15 MIN: Performed by: PAIN MEDICINE

## 2020-09-04 PROCEDURE — 64636 DESTROY L/S FACET JNT ADDL: CPT | Performed by: PAIN MEDICINE

## 2020-09-04 PROCEDURE — 6360000002 HC RX W HCPCS: Performed by: ANESTHESIOLOGY

## 2020-09-04 PROCEDURE — 3700000000 HC ANESTHESIA ATTENDED CARE: Performed by: PAIN MEDICINE

## 2020-09-04 RX ORDER — PREGABALIN 150 MG/1
150 CAPSULE ORAL 2 TIMES DAILY
COMMUNITY
End: 2020-09-09 | Stop reason: SDUPTHER

## 2020-09-04 RX ORDER — PROMETHAZINE HYDROCHLORIDE 25 MG/ML
12.5 INJECTION, SOLUTION INTRAMUSCULAR; INTRAVENOUS
Status: DISCONTINUED | OUTPATIENT
Start: 2020-09-04 | End: 2020-09-04 | Stop reason: HOSPADM

## 2020-09-04 RX ORDER — OXYCODONE HYDROCHLORIDE AND ACETAMINOPHEN 5; 325 MG/1; MG/1
1 TABLET ORAL PRN
Status: DISCONTINUED | OUTPATIENT
Start: 2020-09-04 | End: 2020-09-04 | Stop reason: HOSPADM

## 2020-09-04 RX ORDER — 0.9 % SODIUM CHLORIDE 0.9 %
500 INTRAVENOUS SOLUTION INTRAVENOUS
Status: DISCONTINUED | OUTPATIENT
Start: 2020-09-04 | End: 2020-09-04 | Stop reason: HOSPADM

## 2020-09-04 RX ORDER — MORPHINE SULFATE 2 MG/ML
2 INJECTION, SOLUTION INTRAMUSCULAR; INTRAVENOUS EVERY 5 MIN PRN
Status: DISCONTINUED | OUTPATIENT
Start: 2020-09-04 | End: 2020-09-04 | Stop reason: HOSPADM

## 2020-09-04 RX ORDER — MEPERIDINE HYDROCHLORIDE 50 MG/ML
12.5 INJECTION INTRAMUSCULAR; INTRAVENOUS; SUBCUTANEOUS EVERY 5 MIN PRN
Status: DISCONTINUED | OUTPATIENT
Start: 2020-09-04 | End: 2020-09-04 | Stop reason: HOSPADM

## 2020-09-04 RX ORDER — MIDAZOLAM HYDROCHLORIDE 1 MG/ML
INJECTION INTRAMUSCULAR; INTRAVENOUS PRN
Status: DISCONTINUED | OUTPATIENT
Start: 2020-09-04 | End: 2020-09-04 | Stop reason: SDUPTHER

## 2020-09-04 RX ORDER — SODIUM CHLORIDE 0.9 % (FLUSH) 0.9 %
10 SYRINGE (ML) INJECTION PRN
Status: DISCONTINUED | OUTPATIENT
Start: 2020-09-04 | End: 2020-09-04 | Stop reason: HOSPADM

## 2020-09-04 RX ORDER — IBUPROFEN 600 MG/1
600 TABLET ORAL EVERY 6 HOURS PRN
COMMUNITY
End: 2020-12-23 | Stop reason: SDUPTHER

## 2020-09-04 RX ORDER — DIPHENHYDRAMINE HYDROCHLORIDE 50 MG/ML
12.5 INJECTION INTRAMUSCULAR; INTRAVENOUS
Status: DISCONTINUED | OUTPATIENT
Start: 2020-09-04 | End: 2020-09-04 | Stop reason: HOSPADM

## 2020-09-04 RX ORDER — SODIUM CHLORIDE 0.9 % (FLUSH) 0.9 %
10 SYRINGE (ML) INJECTION EVERY 12 HOURS SCHEDULED
Status: DISCONTINUED | OUTPATIENT
Start: 2020-09-04 | End: 2020-09-04 | Stop reason: HOSPADM

## 2020-09-04 RX ORDER — HYDROCODONE BITARTRATE AND ACETAMINOPHEN 5; 325 MG/1; MG/1
1 TABLET ORAL EVERY 6 HOURS PRN
COMMUNITY
End: 2020-09-09 | Stop reason: SDUPTHER

## 2020-09-04 RX ORDER — LABETALOL 20 MG/4 ML (5 MG/ML) INTRAVENOUS SYRINGE
5 EVERY 10 MIN PRN
Status: DISCONTINUED | OUTPATIENT
Start: 2020-09-04 | End: 2020-09-04 | Stop reason: HOSPADM

## 2020-09-04 RX ORDER — ONDANSETRON 2 MG/ML
4 INJECTION INTRAMUSCULAR; INTRAVENOUS
Status: DISCONTINUED | OUTPATIENT
Start: 2020-09-04 | End: 2020-09-04 | Stop reason: HOSPADM

## 2020-09-04 RX ORDER — BUPIVACAINE HYDROCHLORIDE 2.5 MG/ML
INJECTION, SOLUTION EPIDURAL; INFILTRATION; INTRACAUDAL PRN
Status: DISCONTINUED | OUTPATIENT
Start: 2020-09-04 | End: 2020-09-04 | Stop reason: ALTCHOICE

## 2020-09-04 RX ORDER — OXYCODONE HYDROCHLORIDE AND ACETAMINOPHEN 5; 325 MG/1; MG/1
2 TABLET ORAL PRN
Status: DISCONTINUED | OUTPATIENT
Start: 2020-09-04 | End: 2020-09-04 | Stop reason: HOSPADM

## 2020-09-04 RX ADMIN — MIDAZOLAM HYDROCHLORIDE 2 MG: 1 INJECTION, SOLUTION INTRAMUSCULAR; INTRAVENOUS at 09:55

## 2020-09-04 ASSESSMENT — PAIN - FUNCTIONAL ASSESSMENT: PAIN_FUNCTIONAL_ASSESSMENT: 0-10

## 2020-09-04 ASSESSMENT — PULMONARY FUNCTION TESTS
PIF_VALUE: 0

## 2020-09-04 ASSESSMENT — PAIN DESCRIPTION - DESCRIPTORS: DESCRIPTORS: ACHING;CONSTANT;RADIATING;SHARP

## 2020-09-04 ASSESSMENT — PAIN SCALES - GENERAL
PAINLEVEL_OUTOF10: 0
PAINLEVEL_OUTOF10: 0

## 2020-09-04 NOTE — ANESTHESIA PRE PROCEDURE
Department of Anesthesiology  Preprocedure Note       Name:  Rosa López   Age:  64 y.o.  :  1960                                          MRN:  [id number]         Date:  2020      Surgeon: Amish Bautista):  Virginia Byrne MD    Procedure: Procedure(s):  NERVE RADIOFREQUENCY ABLATION - L3-4, L4-5    Medications prior to admission:   Prior to Admission medications    Medication Sig Start Date End Date Taking? Authorizing Provider   pregabalin (LYRICA) 150 MG capsule Take 150 mg by mouth 2 times daily. Yes Historical Provider, MD   HYDROcodone-acetaminophen (NORCO) 5-325 MG per tablet Take 1 tablet by mouth every 6 hours as needed for Pain. Yes Historical Provider, MD   ibuprofen (ADVIL;MOTRIN) 600 MG tablet Take 600 mg by mouth every 6 hours as needed for Pain   Yes Historical Provider, MD   ondansetron (ZOFRAN) 4 MG tablet TAKE ONE TABLET BY MOUTH EVERY 8 HOURS AS NEEDED FOR NAUSEA 20  Yes PAUL Harris CNP   Handicap Placard [facility] It is my medical opinion that Solange Donahue requires a disability parking placard for the following reasons:  He has limited walking ability due to an arthritic condition. Duration of need: permanent 20   PAUL Harris CNP       Current medications:    Current Facility-Administered Medications   Medication Dose Route Frequency Provider Last Rate Last Dose    sodium chloride flush 0.9 % injection 10 mL  10 mL Intravenous 2 times per day Sophia Harris MD        sodium chloride flush 0.9 % injection 10 mL  10 mL Intravenous PRN Sophia Harris MD           Allergies:     Allergies   Allergen Reactions    Morphine Other (See Comments)     Patient developed suicidal ideations    Fentanyl Hives       Problem List:    Patient Active Problem List   Diagnosis Code    Chronic back pain M54.9, G89.29       Past Medical History:        Diagnosis Date    Chronic back pain     Headache(784.0)     Kidney stones     Osteoarthritis        Past Surgical History: Procedure Laterality Date    ANESTHESIA NERVE BLOCK N/A 8/14/2020    NERVE BLOCK - MBB # 1 L3-4, L4-5 performed by John Wen MD at [address] [place] [address]  2012    LITHOTRIPSY  08/2018    NERVE BLOCK  08/14/2020    NERVE BLOCK - MBB # 1 L3-4, L4-5 (N/A )     NERVE BLOCK Bilateral 08/21/2020    NERVE BLOCK - MBB #2 L3-4 L4-5 (Bilateral )     NERVE BLOCK Bilateral 8/21/2020    NERVE BLOCK - MBB #2 L3-4 L4-5 performed by [facility] MD at [facility]      spinal stimulator placement 6-7 years ago   [facility]  01/30/2020    removal    SPINAL CORD STIMULATOR SURGERY N/A 1/30/2020    SPINAL CORD STIMULATOR IMPLANT REMOVAL, performed by Lucero Guadalupe DO at [address]      laminectomy X2 and Spinal Cord stimulartor implant    URETER STENT PLACEMENT         Social History:    Social History     Tobacco Use    Smoking status: Never Smoker    Smokeless tobacco: Never Used   Substance Use Topics    Alcohol use: Yes     Comment: rarely                                Counseling given: Not Answered      Vital Signs (Current):   Vitals:    09/04/20 0856   BP: (!) 148/90   Pulse: 70   Resp: 15   Temp: 96.7 °F (35.9 °C)   TempSrc: Temporal   SpO2: 98%   Weight: 192 lb (87.1 kg)   Height: 5' 8\" (1.727 m)                                              BP Readings from Last 3 Encounters:   09/04/20 (!) 148/90   08/21/20 126/74   08/21/20 125/85       NPO Status: Time of last liquid consumption: 2100                        Time of last solid consumption: 1600                        Date of last liquid consumption: 09/03/20                        Date of last solid food consumption: 09/03/20    BMI:   Wt Readings from Last 3 Encounters:   09/04/20 192 lb (87.1 kg)   08/21/20 193 lb (87.5 kg)   08/14/20 194 lb 9.6 oz (88.3 kg)     Body mass index is 29.19 kg/m².     CBC:   Lab Results   Component Value Date    WBC 5.5 03/02/2019    RBC 4.95 03/02/2019    RBC 5.01 01/02/2012    HGB 15.5 03/02/2019    HCT 46.2 03/02/2019    MCV 93.5 03/02/2019    RDW 12.8 03/02/2019     03/02/2019     01/02/2012       CMP:   Lab Results   Component Value Date     03/02/2019    K 4.1 03/02/2019     03/02/2019    CO2 22 03/02/2019    BUN 14 03/02/2019    CREATININE 0.76 03/02/2019    GFRAA >60 03/02/2019    LABGLOM >60 03/02/2019    GLUCOSE 131 03/02/2019    PROT 6.9 03/02/2019    CALCIUM 9.1 03/02/2019    BILITOT 0.66 03/02/2019    ALKPHOS 64 03/02/2019    AST 17 03/02/2019    ALT 23 03/02/2019       POC Tests: No results for input(s): POCGLU, POCNA, POCK, POCCL, POCBUN, POCHEMO, POCHCT in the last 72 hours. Coags: No results found for: PROTIME, INR, APTT    HCG (If Applicable): No results found for: PREGTESTUR, PREGSERUM, HCG, HCGQUANT     ABGs: No results found for: PHART, PO2ART, ZPB9LVF, YTJ5JPH, BEART, V3KRMSRZ     Type & Screen (If Applicable):  No results found for: LABABO, LABRH    Drug/Infectious Status (If Applicable):  No results found for: HIV, HEPCAB    COVID-19 Screening (If Applicable):   Lab Results   Component Value Date    COVID19 Not Detected 08/31/2020         Anesthesia Evaluation  Patient summary reviewed and Nursing notes reviewed  Airway: Mallampati: III  TM distance: >3 FB   Neck ROM: full  Mouth opening: > = 3 FB Dental: normal exam         Pulmonary:Negative Pulmonary ROS and normal exam                               Cardiovascular:Negative CV ROS                      Neuro/Psych:   Negative Neuro/Psych ROS              GI/Hepatic/Renal: Neg GI/Hepatic/Renal ROS            Endo/Other:    (+) : arthritis:., .                  ROS comment: -NPO AFTER MIDNIGHT   -ALLERGIES - MORPHINE, FENTANYL Abdominal:           Vascular: negative vascular ROS. Anesthesia Plan      MAC     ASA 2       Induction: intravenous.     MIPS: Postoperative opioids intended and Prophylactic antiemetics administered. Anesthetic plan and risks discussed with patient. Plan discussed with CRNA.     Attending anesthesiologist reviewed and agrees with Renetta Payne MD   9/4/2020

## 2020-09-04 NOTE — OP NOTE
Lumbar Radiofrequency Ablation:  SURGEON: Amando Pritchard    PRE-OP DIAGNOSIS: M47.817 (lumbosacral spondylosis), M54.5 (low back pain)    POST-OP DIAGNOSIS: Same. PROCEDURE PERFORMED: Radiofrequency Ablation Medial Branch Nerve at Right L3 - 4 and L4 - 5 facet joint(s). HISTORY AND INDICATIONS: Satisfactory response with previous RFA/ medial branch blocks at the indicated levels. Recurrence of painful symptoms attributed to the above diagnosis. The planned treatment is medically necessary to relieve pain, restore function and or reduce reliance on pain medication. EBL: minimal    CONSENT: Patient has undergone the educational process with this procedure, is aware and fully understands the risks involved: potential damage to any and all body organs including possible bleeding, infection, nerve injury, paralysis, allergic reaction and headache. Patient also understands that the procedure will be undertaken in a safe, controlled and monitored setting. Patient recognizes that the benefits may include relief from pain and reduction in the oral use of medications. Patient agreed to proceed. The patient was counseled at length about the risks of zuleyka Covid-19 during their perioperative period and any recovery window from their procedure. The patient was made aware that zuleyka Covid-19  may worsen their prognosis for recovering from their procedure  and lend to a higher morbidity and/or mortality risk. All material risks, benefits, and reasonable alternatives including postponing the procedure were discussed. The patient does wish to proceed with the procedure at this time. MONITORS INSTITUTED INCLUDE but not limited to:   Pulse Oximetry  Non-invasive blood pressure monitoring    PROCEDURE NOTE: The patient was taken to the procedure room and placed prone with the appropriate padding and positioning to assure patient comfort and physician access to the procedure site.  Time out was performed prior to starting the procedure. Fluoroscopic evaluation was utilized to target the appropriate treatment areas. The skin was prepped with antiseptic solution and draped sterilely. Then 0.5% lidocaine was used to anesthetize the skin and subcutaneous tissue. Under fluoroscopic guidance a 20 gauge x 10cm x 10mm active tip was advanced to the medial branch nerve at the indicated levels below to innervate the following facet joints Right L3 - 4 and L4 - 5 . The needles were placed sequentially. Position confirmed radiographically with the fluoroscope. Active tip corresponding at the base of the superior articulating process and/or sacral ala for the lumbar RFA. Motor stimulation checked at 2hz up to 3V and confirmed negative for radicular stimulation. After confirmation of the needle placement the patient received 1 ml of 0.25% marcaine to provide anesthesia. Radiofrequency was delivered to the lumbar region at 80 degrees for a limit of 90 seconds in length with no ill effect. The patient tolerated the procedure well and was transported to the recovery room where the patient was monitored with no complications and with stable vital signs. Patient was discharged with appropriate written discharge instructions. Follow-up discussed.       [address] [place] [address]

## 2020-09-04 NOTE — ANESTHESIA POSTPROCEDURE EVALUATION
Department of Anesthesiology  Postprocedure Note    Patient: Liban Mckeon  MRN: [id number]  YOB: 1960  Date of evaluation: 9/4/2020  Time:  10:38 AM     Procedure Summary     Date:  09/04/20 Room / Location:  [facility] / [address] [place] [address]    Anesthesia Start:  0162 Anesthesia Stop:  1013    Procedure:  NERVE RADIOFREQUENCY ABLATION - L3-4, L4-5 (Right ) Diagnosis:  (M47.817 SPONDYLOSIS WITHOUT MYELOPATHY)    Surgeon:  Flavia Almaraz MD Responsible Provider:  Tyra Akhtar MD    Anesthesia Type:  MAC ASA Status:  2          Anesthesia Type: MAC    Zara Phase I: Zara Score: 10    Zara Phase II: Zara Score: 10    Last vitals: Reviewed and per EMR flowsheets.        Anesthesia Post Evaluation    Patient location during evaluation: PACU  Patient participation: complete - patient participated  Level of consciousness: awake and alert  Airway patency: patent  Nausea & Vomiting: no nausea and no vomiting  Complications: no  Cardiovascular status: hemodynamically stable  Respiratory status: nasal cannula and spontaneous ventilation  Hydration status: euvolemic

## 2020-09-04 NOTE — H&P
Pain Pre-Op H&P Note    Emiliano Pritchard    HPI: Mihir Villareal  presents with low back pain.     Past Medical History:   Diagnosis Date    Chronic back pain     Headache(784.0)     Kidney stones     Osteoarthritis        Past Surgical History:   Procedure Laterality Date    ANESTHESIA NERVE BLOCK N/A 8/14/2020    NERVE BLOCK - MBB # 1 L3-4, L4-5 performed by Anna Marie Antonio MD at [facility]  2012    LITHOTRIPSY  08/2018    NERVE BLOCK  08/14/2020    NERVE BLOCK - MBB # 1 L3-4, L4-5 (N/A )     NERVE BLOCK Bilateral 08/21/2020    NERVE BLOCK - MBB #2 L3-4 L4-5 (Bilateral )     NERVE BLOCK Bilateral 8/21/2020    NERVE BLOCK - MBB #2 L3-4 L4-5 performed by Anna Marie [facility] MD at [facility]      spinal stimulator placement 6-7 years ago   [facility]  01/30/2020    removal    SPINAL CORD STIMULATOR SURGERY N/A 1/30/2020    SPINAL CORD STIMULATOR IMPLANT REMOVAL, performed by Hieu Salgado DO at [address]      laminectomy X2 and Spinal Cord stimulartor implant    URETER STENT PLACEMENT         Family History   Problem Relation Age of Onset    Diabetes Mother     High Blood Pressure Mother     Diabetes Father     Glaucoma Father     Eczema Father        Allergies   Allergen Reactions    Morphine Other (See Comments)     Patient developed suicidal ideations    Fentanyl Hives         Current Facility-Administered Medications:     sodium chloride flush 0.9 % injection 10 mL, 10 mL, Intravenous, 2 times per day, Anais Galaviz MD    sodium chloride flush 0.9 % injection 10 mL, 10 mL, Intravenous, PRN, Anais Galaviz MD    Social History     Tobacco Use    Smoking status: Never Smoker    Smokeless tobacco: Never Used   Substance Use Topics    Alcohol use: Yes     Comment: rarely       Review of Systems:   Focused review of systems was performed, and negative as pertinent to diagnosis, except as stated in HPI. Physical Exam:     BP (!) 148/90   Pulse 70   Temp 96.7 °F (35.9 °C) (Temporal)   Resp 15   Ht 5' 8\" (1.727 m)   Wt 192 lb (87.1 kg)   SpO2 98%   BMI 29.19 kg/m²     Physical Exam  Constitutional:       Appearance: Normal appearance. HENT:      Head: Normocephalic and atraumatic. Eyes:      General: Lids are normal.   Neck:      Musculoskeletal: Normal range of motion. Pulmonary:      Effort: Pulmonary effort is normal.   Skin:     Comments: Visualized skin with no rash   Neurological:      Mental Status: He is alert. Psychiatric:         Attention and Perception: Attention and perception normal.         Mood and Affect: Mood and affect normal.            Patient's current physical status, medications, medical history, and HPI have been reviewed and updated as appropriate on this date: 09/04/20    Risk/Benefit(s): The risks, benefits, alternatives, and potential complications have been discussed with the patient/family and informed consent has been obtained for the procedure/sedation.     Diagnosis:   M47.817 SPONDYLOSIS WITHOUT MYELOPATHY      Plan: Lumbar RFA        Flash Looney

## 2020-09-08 ENCOUNTER — HOSPITAL ENCOUNTER (OUTPATIENT)
Dept: PREADMISSION TESTING | Age: 60
Discharge: HOME | End: 2020-09-12
Payer: COMMERCIAL

## 2020-09-08 PROCEDURE — U0003 INFECTIOUS AGENT DETECTION BY NUCLEIC ACID (DNA OR RNA); SEVERE ACUTE RESPIRATORY SYNDROME CORONAVIRUS 2 (SARS-COV-2) (CORONAVIRUS DISEASE [COVID-19]), AMPLIFIED PROBE TECHNIQUE, MAKING USE OF HIGH THROUGHPUT TECHNOLOGIES AS DESCRIBED BY CMS-2020-01-R: HCPCS

## 2020-09-09 LAB — SARS-COV-2, NAA: NOT DETECTED

## 2020-09-10 ENCOUNTER — ANESTHESIA EVENT (OUTPATIENT)
Dept: OPERATING ROOM | Age: 60
End: 2020-09-10
Payer: COMMERCIAL

## 2020-09-11 ENCOUNTER — HOSPITAL ENCOUNTER (OUTPATIENT)
Age: 60
Setting detail: OUTPATIENT SURGERY
Discharge: HOME | End: 2020-09-11
Attending: PAIN MEDICINE | Admitting: PAIN MEDICINE
Payer: COMMERCIAL

## 2020-09-11 ENCOUNTER — APPOINTMENT (OUTPATIENT)
Dept: GENERAL RADIOLOGY | Age: 60
End: 2020-09-11
Attending: PAIN MEDICINE
Payer: COMMERCIAL

## 2020-09-11 ENCOUNTER — ANESTHESIA (OUTPATIENT)
Dept: OPERATING ROOM | Age: 60
End: 2020-09-11
Payer: COMMERCIAL

## 2020-09-11 VITALS
HEIGHT: 68 IN | RESPIRATION RATE: 18 BRPM | OXYGEN SATURATION: 98 % | HEART RATE: 70 BPM | DIASTOLIC BLOOD PRESSURE: 107 MMHG | TEMPERATURE: 98.3 F | SYSTOLIC BLOOD PRESSURE: 139 MMHG | WEIGHT: 191.25 LBS | BODY MASS INDEX: 28.99 KG/M2

## 2020-09-11 VITALS
RESPIRATION RATE: 21 BRPM | SYSTOLIC BLOOD PRESSURE: 153 MMHG | OXYGEN SATURATION: 99 % | DIASTOLIC BLOOD PRESSURE: 80 MMHG

## 2020-09-11 PROCEDURE — 6360000002 HC RX W HCPCS: Performed by: NURSE ANESTHETIST, CERTIFIED REGISTERED

## 2020-09-11 PROCEDURE — 2709999900 HC NON-CHARGEABLE SUPPLY: Performed by: PAIN MEDICINE

## 2020-09-11 PROCEDURE — 64636 DESTROY L/S FACET JNT ADDL: CPT | Performed by: PAIN MEDICINE

## 2020-09-11 PROCEDURE — 2500000003 HC RX 250 WO HCPCS: Performed by: PAIN MEDICINE

## 2020-09-11 PROCEDURE — 3700000001 HC ADD 15 MINUTES (ANESTHESIA): Performed by: PAIN MEDICINE

## 2020-09-11 PROCEDURE — 3209999900 FLUORO FOR SURGICAL PROCEDURES

## 2020-09-11 PROCEDURE — 2720000010 HC SURG SUPPLY STERILE: Performed by: PAIN MEDICINE

## 2020-09-11 PROCEDURE — 7100000010 HC PHASE II RECOVERY - FIRST 15 MIN: Performed by: PAIN MEDICINE

## 2020-09-11 PROCEDURE — 3600000002 HC SURGERY LEVEL 2 BASE: Performed by: PAIN MEDICINE

## 2020-09-11 PROCEDURE — 3600000012 HC SURGERY LEVEL 2 ADDTL 15MIN: Performed by: PAIN MEDICINE

## 2020-09-11 PROCEDURE — 64635 DESTROY LUMB/SAC FACET JNT: CPT | Performed by: PAIN MEDICINE

## 2020-09-11 PROCEDURE — 7100000011 HC PHASE II RECOVERY - ADDTL 15 MIN: Performed by: PAIN MEDICINE

## 2020-09-11 PROCEDURE — 3700000000 HC ANESTHESIA ATTENDED CARE: Performed by: PAIN MEDICINE

## 2020-09-11 RX ORDER — SODIUM CHLORIDE 0.9 % (FLUSH) 0.9 %
10 SYRINGE (ML) INJECTION EVERY 12 HOURS SCHEDULED
Status: DISCONTINUED | OUTPATIENT
Start: 2020-09-11 | End: 2020-09-11 | Stop reason: HOSPADM

## 2020-09-11 RX ORDER — SODIUM CHLORIDE 0.9 % (FLUSH) 0.9 %
10 SYRINGE (ML) INJECTION PRN
Status: DISCONTINUED | OUTPATIENT
Start: 2020-09-11 | End: 2020-09-11 | Stop reason: HOSPADM

## 2020-09-11 RX ORDER — HYDRALAZINE HYDROCHLORIDE 20 MG/ML
5 INJECTION INTRAMUSCULAR; INTRAVENOUS EVERY 10 MIN PRN
Status: DISCONTINUED | OUTPATIENT
Start: 2020-09-11 | End: 2020-09-11 | Stop reason: HOSPADM

## 2020-09-11 RX ORDER — SODIUM CHLORIDE, SODIUM LACTATE, POTASSIUM CHLORIDE, CALCIUM CHLORIDE 600; 310; 30; 20 MG/100ML; MG/100ML; MG/100ML; MG/100ML
INJECTION, SOLUTION INTRAVENOUS CONTINUOUS
Status: DISCONTINUED | OUTPATIENT
Start: 2020-09-11 | End: 2020-09-11 | Stop reason: HOSPADM

## 2020-09-11 RX ORDER — LIDOCAINE HYDROCHLORIDE 10 MG/ML
1 INJECTION, SOLUTION EPIDURAL; INFILTRATION; INTRACAUDAL; PERINEURAL
Status: DISCONTINUED | OUTPATIENT
Start: 2020-09-11 | End: 2020-09-11 | Stop reason: HOSPADM

## 2020-09-11 RX ORDER — PROMETHAZINE HYDROCHLORIDE 25 MG/ML
6.25 INJECTION, SOLUTION INTRAMUSCULAR; INTRAVENOUS
Status: DISCONTINUED | OUTPATIENT
Start: 2020-09-11 | End: 2020-09-11 | Stop reason: HOSPADM

## 2020-09-11 RX ORDER — DIPHENHYDRAMINE HYDROCHLORIDE 50 MG/ML
12.5 INJECTION INTRAMUSCULAR; INTRAVENOUS
Status: DISCONTINUED | OUTPATIENT
Start: 2020-09-11 | End: 2020-09-11 | Stop reason: HOSPADM

## 2020-09-11 RX ORDER — BUPIVACAINE HYDROCHLORIDE 2.5 MG/ML
INJECTION, SOLUTION EPIDURAL; INFILTRATION; INTRACAUDAL PRN
Status: DISCONTINUED | OUTPATIENT
Start: 2020-09-11 | End: 2020-09-11 | Stop reason: ALTCHOICE

## 2020-09-11 RX ORDER — MIDAZOLAM HYDROCHLORIDE 1 MG/ML
INJECTION INTRAMUSCULAR; INTRAVENOUS PRN
Status: DISCONTINUED | OUTPATIENT
Start: 2020-09-11 | End: 2020-09-11 | Stop reason: SDUPTHER

## 2020-09-11 RX ORDER — ONDANSETRON 2 MG/ML
4 INJECTION INTRAMUSCULAR; INTRAVENOUS
Status: DISCONTINUED | OUTPATIENT
Start: 2020-09-11 | End: 2020-09-11 | Stop reason: HOSPADM

## 2020-09-11 RX ADMIN — MIDAZOLAM HYDROCHLORIDE 2 MG: 1 INJECTION, SOLUTION INTRAMUSCULAR; INTRAVENOUS at 13:21

## 2020-09-11 ASSESSMENT — PULMONARY FUNCTION TESTS
PIF_VALUE: 0

## 2020-09-11 ASSESSMENT — PAIN SCALES - GENERAL: PAINLEVEL_OUTOF10: 0

## 2020-09-11 ASSESSMENT — PAIN DESCRIPTION - DESCRIPTORS: DESCRIPTORS: STABBING

## 2020-09-11 NOTE — OP NOTE
Lumbar Radiofrequency Ablation:  SURGEON: Lizette Pritchard    PRE-OP DIAGNOSIS: M47.817 (lumbosacral spondylosis), M54.5 (low back pain)    POST-OP DIAGNOSIS: Same. PROCEDURE PERFORMED: Radiofrequency Ablation Medial Branch Nerve at Left L3 - 4 and L4 - 5 facet joint(s). HISTORY AND INDICATIONS: Satisfactory response with previous RFA/ medial branch blocks at the indicated levels. Recurrence of painful symptoms attributed to the above diagnosis. The planned treatment is medically necessary to relieve pain, restore function and or reduce reliance on pain medication. EBL: minimal    CONSENT: Patient has undergone the educational process with this procedure, is aware and fully understands the risks involved: potential damage to any and all body organs including possible bleeding, infection, nerve injury, paralysis, allergic reaction and headache. Patient also understands that the procedure will be undertaken in a safe, controlled and monitored setting. Patient recognizes that the benefits may include relief from pain and reduction in the oral use of medications. Patient agreed to proceed. The patient was counseled at length about the risks of zuleyka Covid-19 during their perioperative period and any recovery window from their procedure. The patient was made aware that zuleyka Covid-19  may worsen their prognosis for recovering from their procedure  and lend to a higher morbidity and/or mortality risk. All material risks, benefits, and reasonable alternatives including postponing the procedure were discussed. The patient does wish to proceed with the procedure at this time. MONITORS INSTITUTED INCLUDE but not limited to:   Pulse Oximetry  Non-invasive blood pressure monitoring    PROCEDURE NOTE: The patient was taken to the procedure room and placed prone with the appropriate padding and positioning to assure patient comfort and physician access to the procedure site.  Time out was performed prior to starting the procedure. Fluoroscopic evaluation was utilized to target the appropriate treatment areas. The skin was prepped with antiseptic solution and draped sterilely. Then 0.5% lidocaine was used to anesthetize the skin and subcutaneous tissue. Under fluoroscopic guidance a 20 gauge x 10cm x 10mm active tip was advanced to the medial branch nerve at the indicated levels below to innervate the following facet joints Left L3 - 4 and L4 - 5 . The needles were placed sequentially. Position confirmed radiographically with the fluoroscope. Active tip corresponding at the base of the superior articulating process and/or sacral ala for the lumbar RFA. Motor stimulation checked at 2hz up to 3V and confirmed negative for radicular stimulation. After confirmation of the needle placement the patient received 1 ml of 0.25% marcaine to provide anesthesia. Radiofrequency was delivered to the lumbar region at 80 degrees for a limit of 90 seconds in length with no ill effect. The patient tolerated the procedure well and was transported to the recovery room where the patient was monitored with no complications and with stable vital signs. Patient was discharged with appropriate written discharge instructions. Follow-up discussed.       Flavia Almaraz

## 2020-09-11 NOTE — ANESTHESIA POSTPROCEDURE EVALUATION
POST- ANESTHESIA EVALUATION       Pt Name: Rhina Rizvi  MRN: [id number]  [place]: 1960  Date of evaluation: 9/11/2020  Time:  2:11 PM      BP (!) 139/107   Pulse 70   Temp 98.3 °F (36.8 °C) (Temporal)   Resp 18   Ht 5' 8\" (1.727 m)   Wt 191 lb 4 oz (86.8 kg)   SpO2 98%   BMI 29.08 kg/m²      Consciousness Level  Awake  Cardiopulmonary Status  Stable  Pain Adequately Treated YES  Nausea / Vomiting  NO  Adequate Hydration  YES  Anesthesia Related Complications NONE      Electronically signed by Roslyn Forrester MD on 9/11/2020 at 2:11 PM       Department of Anesthesiology  Postprocedure Note    Patient: Rhina Rizvi  MRN: [id number]  [place]: 1960  Date of evaluation: 9/11/2020  Time:  2:11 PM     Procedure Summary     Date:  09/11/20 Room / Location:  [address]    Anesthesia Start:  3353 Anesthesia Stop:  6097    Procedure:  NERVE RADIOFREQUENCY ABLATION L3-4, L4-5 (Left ) Diagnosis:  (M47.817 SPONDYLOSIS WITHOUT MYELOPATHY)    Surgeon:  Jin Ricardo MD Responsible Provider:  PAUL Jose CRNA    Anesthesia Type:  MAC ASA Status:  2          Anesthesia Type: MAC    Zara Phase I: Zara Score: 10    Zara Phase II: Zara Score: 10    Last vitals: Reviewed and per EMR flowsheets.        Anesthesia Post Evaluation

## 2020-09-11 NOTE — ANESTHESIA PRE PROCEDURE
Department of Anesthesiology  Preprocedure Note       Name:  Yong Phalen   Age:  64 y.o.  :  1960                                          MRN:  [id number]         Date:  2020      Surgeon: Maggie Martino):  Kayley Causey MD    Procedure: Procedure(s):  NERVE RADIOFREQUENCY ABLATION L3-4, L4-5    Medications prior to admission:   Prior to Admission medications    Medication Sig Start Date End Date Taking? Authorizing Provider   HYDROcodone-acetaminophen (NORCO) 5-325 MG per tablet Take 1 tablet by mouth every 6 hours as needed for Pain for up to 30 days. 9/9/20 10/9/20 Yes PAUL Max CNP   pregabalin (LYRICA) 150 MG capsule Take 1 capsule by mouth 2 times daily for 30 days. 9/9/20 10/9/20 Yes PAUL Max CNP   ibuprofen (ADVIL;MOTRIN) 600 MG tablet Take 600 mg by mouth every 6 hours as needed for Pain   Yes Historical Provider, MD   ondansetron (ZOFRAN) 4 MG tablet TAKE ONE TABLET BY MOUTH EVERY 8 HOURS AS NEEDED FOR NAUSEA 20  Yes PAUL Max CNP   [place] [facility] my medical opinion that Belinda Zavala requires a disability parking placard for the following reasons:  He has limited walking ability due to an arthritic condition. Duration of need: permanent 20   PAUL Max CNP       Current medications:    Current Facility-Administered Medications   Medication Dose Route Frequency Provider Last Rate Last Dose    lactated ringers infusion   Intravenous Continuous Julissa Davila MD        sodium chloride flush 0.9 % injection 10 mL  10 mL Intravenous 2 times per day Julissa Davila MD        sodium chloride flush 0.9 % injection 10 mL  10 mL Intravenous PRN Julissa Davila MD        lidocaine PF 1 % injection 1 mL  1 mL Intradermal Once PRN Julissa Davila MD           Allergies:     Allergies   Allergen Reactions    Morphine Other (See Comments)     Patient developed suicidal ideations    Fentanyl Hives Problem List:    Patient Active Problem List   Diagnosis Code    Chronic back pain M54.9, G89.29       Past Medical History:        Diagnosis Date    Chronic back pain     Headache(784.0)     Kidney stones     Osteoarthritis        Past Surgical History:        Procedure Laterality Date    ANESTHESIA NERVE BLOCK N/A 8/14/2020    NERVE BLOCK - MBB # 1 L3-4, L4-5 performed by Madison Cruz MD at [address] [facility]  2012    LITHOTRIPSY  08/2018    NERVE BLOCK  08/14/2020    NERVE BLOCK - MBB # 1 L3-4, L4-5 (N/A )     NERVE BLOCK Bilateral 08/21/2020    NERVE BLOCK - MBB #2 L3-4 L4-5 (Bilateral )     NERVE BLOCK Bilateral 8/21/2020    NERVE BLOCK - MBB #2 L3-4 L4-5 performed by Madison Cruz MD at [address] Right 09/04/2020    NERVE RADIOFREQUENCY ABLATION - L3-4, L4-5     NERVE BLOCK Right 09/04/2020    NERVE RADIOFREQUENCY ABLATION - L3-4, L4-5 (Right )     OTHER SURGICAL HISTORY      spinal stimulator placement 6-7 years ago    PAIN MANAGEMENT PROCEDURE Right 9/4/2020    NERVE RADIOFREQUENCY ABLATION - L3-4, L4-5 performed by Madison Cruz MD at [address] [facility]  01/30/2020    removal   [facility] N/A 1/30/2020    SPINAL CORD STIMULATOR IMPLANT REMOVAL, performed by Mariam Butts DO at [address] [facility] [address]      laminectomy X2 and Spinal Cord stimulartor implant    URETER STENT PLACEMENT         Social History:    Social History     Tobacco Use    Smoking status: Never Smoker    Smokeless tobacco: Never Used   Substance Use Topics    Alcohol use: Yes     Comment: rarely                                Counseling given: Not Answered      Vital Signs (Current):   Vitals:    09/11/20 1207   BP: 131/78   Pulse: 72   Resp: 18   Temp: 97.1 °F (36.2 °C)   TempSrc: Skin   SpO2: 99%   Weight: 191 lb 4 oz (86.8 kg)   Height: 5' 8\" (1.727 m) BP Readings from Last 3 Encounters:   09/11/20 131/78   09/04/20 133/73   09/04/20 124/79       NPO Status: Time of last liquid consumption: 1600                        Time of last solid consumption: 1600                        Date of last liquid consumption: 09/10/20                        Date of last solid food consumption: 09/10/20    BMI:   Wt Readings from Last 3 Encounters:   09/11/20 191 lb 4 oz (86.8 kg)   09/04/20 192 lb (87.1 kg)   08/21/20 193 lb (87.5 kg)     Body mass index is 29.08 kg/m². CBC:   Lab Results   Component Value Date    WBC 5.5 03/02/2019    RBC 4.95 03/02/2019    RBC 5.01 01/02/2012    HGB 15.5 03/02/2019    HCT 46.2 03/02/2019    MCV 93.5 03/02/2019    RDW 12.8 03/02/2019     03/02/2019     01/02/2012       CMP:   Lab Results   Component Value Date     03/02/2019    K 4.1 03/02/2019     03/02/2019    CO2 22 03/02/2019    BUN 14 03/02/2019    CREATININE 0.76 03/02/2019    GFRAA >60 03/02/2019    LABGLOM >60 03/02/2019    GLUCOSE 131 03/02/2019    PROT 6.9 03/02/2019    CALCIUM 9.1 03/02/2019    BILITOT 0.66 03/02/2019    ALKPHOS 64 03/02/2019    AST 17 03/02/2019    ALT 23 03/02/2019       POC Tests: No results for input(s): POCGLU, POCNA, POCK, POCCL, POCBUN, POCHEMO, POCHCT in the last 72 hours.     Coags: No results found for: PROTIME, INR, APTT    HCG (If Applicable): No results found for: PREGTESTUR, PREGSERUM, HCG, HCGQUANT     ABGs: No results found for: PHART, PO2ART, TRZ6PHN, PKD9WHA, BEART, I6ZJRNAY     Type & Screen (If Applicable):  No results found for: LABABO, LABRH    Drug/Infectious Status (If Applicable):  No results found for: HIV, HEPCAB    COVID-19 Screening (If Applicable):   Lab Results   Component Value Date    COVID19 Not Detected 09/08/2020         Anesthesia Evaluation  Patient summary reviewed and Nursing notes reviewed no history of anesthetic complications:   Airway: Mallampati: II  TM distance: >3 FB   Neck ROM:

## 2020-10-20 ASSESSMENT — ENCOUNTER SYMPTOMS
BOWEL INCONTINENCE: 0
BACK PAIN: 1
RESPIRATORY NEGATIVE: 1

## 2020-10-20 NOTE — PROGRESS NOTES
HPI:     Back Pain   This is a chronic problem. The current episode started more than 1 year ago. The problem occurs constantly. The problem has been gradually improving since onset. The pain is present in the lumbar spine. The quality of the pain is described as aching, burning, cramping, shooting and stabbing. The pain radiates to the left thigh, right knee, left knee and right thigh. The pain is at a severity of 7/10. The pain is moderate. The pain is worse during the day. The symptoms are aggravated by bending. Associated symptoms include tingling. Pertinent negatives include no bladder incontinence, bowel incontinence, headaches, numbness or weakness. He has tried NSAIDs, ice, heat and chiropractic manipulation for the symptoms. The treatment provided moderate relief. Chronic low back pain. Had spinal cord stimulator in the past explanted. Unsure if the stimulation itself is helping or that the actual device itself was helping to be in place. But did feel that he was having some benefit with the wire in place until it was removed. He had recent lumbar RFA with no benefit. He has done physical therapy. Continues to have pain. He has had epidurals in the past not benefit. MRI lumbar spine with degenerative changes worse at L3-4 and L4-5. Patient denies any new neurological symptoms. Nobowel or bladder incontinence, no weakness, and no falling. Review of OARRS does not show any aberrant prescription behavior.      Past Medical History:   Diagnosis Date    Chronic back pain     Headache(784.0)     Kidney stones     Osteoarthritis        Past Surgical History:   Procedure Laterality Date    ANESTHESIA NERVE BLOCK N/A 8/14/2020    NERVE BLOCK - MBB # 1 L3-4, L4-5 performed by Jeanette Elliott MD at [address] [place] [address]  2012    LITHOTRIPSY  08/2018    NERVE BLOCK  08/14/2020    NERVE BLOCK - MBB # 1 L3-4, L4-5 (N/A )     NERVE BLOCK Bilateral 08/21/2020 NERVE BLOCK - MBB #2 L3-4 L4-5 (Bilateral )     NERVE BLOCK Bilateral 8/21/2020    NERVE BLOCK - MBB #2 L3-4 L4-5 performed by Donna Esposito MD at [address] Right 09/04/2020    NERVE RADIOFREQUENCY ABLATION - L3-4, L4-5     NERVE BLOCK Right 09/04/2020    NERVE RADIOFREQUENCY ABLATION - L3-4, L4-5 (Right )     NERVE BLOCK  09/11/2020    NERVE RADIOFREQUENCY ABLATION L3-4, L4-5     OTHER SURGICAL HISTORY      spinal stimulator placement 6-7 years ago    PAIN MANAGEMENT PROCEDURE Right 9/4/2020    NERVE RADIOFREQUENCY ABLATION - L3-4, L4-5 performed by Donna Esposito MD at [facility] Left 9/11/2020    NERVE RADIOFREQUENCY ABLATION L3-4, L4-5 performed by Donna Esposito MD at [address] [facility]  01/30/2020    removal   [address] N/A 1/30/2020    SPINAL CORD STIMULATOR IMPLANT REMOVAL, performed by Britany Reed DO at [facility]      laminectomy X2 and Spinal Cord stimulartor implant    URETER STENT PLACEMENT         Allergies   Allergen Reactions    Morphine Other (See Comments)     Patient developed suicidal ideations    Fentanyl Hives         Current Outpatient Medications:     ibuprofen (ADVIL;MOTRIN) 600 MG tablet, Take 600 mg by mouth every 6 hours as needed for Pain, Disp: , Rfl:     Handicap Placard [facility], It is my medical opinion that Libia Garibay requires a disability parking placard for the following reasons: He has limited walking ability due to an arthritic condition. Duration of need: permanent, Disp: 1 each, Rfl: 0    ondansetron (ZOFRAN) 4 MG tablet, TAKE ONE TABLET BY MOUTH EVERY 8 HOURS AS NEEDED FOR NAUSEA, Disp: 90 tablet, Rfl: 0    pregabalin (LYRICA) 150 MG capsule, Take 1 capsule by mouth 2 times daily for 30 days. , Disp: 60 capsule, Rfl: 0    Family History   Problem Relation Age of Onset    Diabetes Mother     High Blood Pressure Mother  Diabetes Father     Glaucoma Father     Eczema Father        Social History     Socioeconomic History    Marital status:      Spouse name: Not on file    Number of children: Not on file    Years of education: Not on file    Highest education level: Not on file   Occupational History    Not on file   Social Needs    Financial resource strain: Not on file    Food insecurity     Worry: Not on file     Inability: Not on file    Transportation needs     Medical: Not on file     Non-medical: Not on file   Tobacco Use    Smoking status: Never Smoker    Smokeless tobacco: Never Used   Substance and Sexual Activity    Alcohol use: Yes     Comment: rarely    Drug use: No    Sexual activity: Yes     Partners: Female   Lifestyle    Physical activity     Days per week: Not on file     Minutes per session: Not on file    Stress: Not on file   Relationships    Social connections     Talks on phone: Not on file     Gets together: Not on file     Attends Gnosticist service: Not on file     Active member of club or organization: Not on file     Attends meetings of clubs or organizations: Not on file     Relationship status: Not on file    Intimate partner violence     Fear of current or ex partner: Not on file     Emotionally abused: Not on file     Physically abused: Not on file     Forced sexual activity: Not on file   Other Topics Concern    Not on file   Social History Narrative    Not on file       Review of Systems:  Review of Systems   Constitution: Negative. Cardiovascular: Negative. Respiratory: Negative. Musculoskeletal: Positive for back pain. Gastrointestinal: Negative for bowel incontinence. Genitourinary: Negative for bladder incontinence. Neurological: Positive for tingling. Negative for dizziness, headaches, numbness and weakness.        Physical Exam:  BP (!) 140/83   Pulse 71   Temp 98.1 °F (36.7 °C) (Infrared)   Ht 5' 8\" (1.727 m)   Wt 192 lb (87.1 kg)   BMI 29.19 kg/m²     Physical Exam  Constitutional:       Appearance: Normal appearance. HENT:      Head: Normocephalic and atraumatic. Eyes:      General: Lids are normal.   Neck:      Musculoskeletal: Normal range of motion. Pulmonary:      Effort: Pulmonary effort is normal.   Musculoskeletal:      Lumbar back: He exhibits tenderness. Skin:     Comments: Visualized skin with no rash   Neurological:      Mental Status: He is alert. Psychiatric:         Attention and Perception: Attention and perception normal.         Mood and Affect: Mood and affect normal.         Record/Diagnostics Review:    As above, I did review the imaging      Assessment:  1. Lumbosacral spondylosis without myelopathy    2. Chronic bilateral low back pain, unspecified whether sciatica present    3. DDD (degenerative disc disease), lumbar        Treatment Plan:  DISCUSSION: Treatment options discussed with patient and all questions answered to patient's satisfaction. OARRS Review: Reviewed and acceptable for medications prescribed. TREATMENT OPTIONS:     Discussed different treatment options including continued conservative care such as physical therapy, chiropractic care, acupuncture. Discussed different interventional options such as epidural steroids or medial branch blocks. Also discussed neuromodulation in the form of spinal cord stimulation. Also discussed surgical evaluation. RFA at L3-4 and L4-5 without benefit. Consider MBB/RFA lower. Consider spinal cord stimulator retrial.  Consider intrathecal pump  At this point will touch base with surgeon to reevaluate options. Genaro Arnold M.D. I have reviewed the chief complaint and history of present illness (including ROS and PFSH) and vital documentation by my staff and I agree with their documentation and have added where applicable.

## 2020-10-22 ENCOUNTER — OFFICE VISIT (OUTPATIENT)
Dept: PAIN MANAGEMENT | Age: 60
End: 2020-10-22
Payer: COMMERCIAL

## 2020-10-22 VITALS
TEMPERATURE: 98.1 F | SYSTOLIC BLOOD PRESSURE: 140 MMHG | HEART RATE: 71 BPM | HEIGHT: 68 IN | WEIGHT: 192 LBS | BODY MASS INDEX: 29.1 KG/M2 | DIASTOLIC BLOOD PRESSURE: 83 MMHG

## 2020-10-22 PROCEDURE — 99213 OFFICE O/P EST LOW 20 MIN: CPT | Performed by: PAIN MEDICINE

## 2020-11-04 ENCOUNTER — TELEPHONE (OUTPATIENT)
Dept: NEUROSURGERY | Age: 60
End: 2020-11-04

## 2020-11-04 NOTE — TELEPHONE ENCOUNTER
** Patients wife states pain is worse and has numbness down his legs, no relief from pain management, would like to know if you would consider steroids again. Next Visit Date:  11/11/2020    Last Visit  Date  06/10/2020     Patient calls complaining of  Back pain     Severity: Severe    Progression: increased      Patient advised:   If  Symptoms worsen seek treatment at  Emergency Room. You will receive a call back from the office within 24-72 hours.     Is it ok to leave a message if we call back yes

## 2020-11-11 ENCOUNTER — OFFICE VISIT (OUTPATIENT)
Dept: NEUROSURGERY | Age: 60
End: 2020-11-11
Payer: COMMERCIAL

## 2020-11-11 VITALS
OXYGEN SATURATION: 97 % | SYSTOLIC BLOOD PRESSURE: 144 MMHG | BODY MASS INDEX: 29.1 KG/M2 | HEIGHT: 68 IN | WEIGHT: 192 LBS | DIASTOLIC BLOOD PRESSURE: 75 MMHG | HEART RATE: 53 BPM | TEMPERATURE: 97.9 F

## 2020-11-11 PROCEDURE — 99213 OFFICE O/P EST LOW 20 MIN: CPT | Performed by: NEUROLOGICAL SURGERY

## 2020-11-11 NOTE — PROGRESS NOTES
Musa Markham  [facility] [place] [address] [facility] [address]  Dept: [phone]    Patient:  Sherry Wills  YOB: 1960  Date: 11/11/20    The patient is a 64 y.o. male who presents today for consult of the following problems:     Chief Complaint   Patient presents with    Follow-up             HPI:     Sherry Wills is a 64 y.o. male on whom neurosurgical consultation was requested by PAUL Ybarra CNP for management of lumbar spondylosis with radiculopathy. The patient states that he has had significant axial back pain. Pain is achy spasmodic anywhere from 3 to 6 x 10. Significantly worse when he bends forward and attempt to lift back up. Intermittent radiation of bilateral lower extremities approximate S1 distribution in the hamstrings all way down to the popliteal fossa. No numbness tingling in the feet. Denies any bowel bladder incontinence or saddle anesthesia. Has been through greater than 8 weeks of therapy as well as multiple injections and ablations.   The facet blocks medial branch blocks did help with the ablations unfortunately not been helpful      History:     Past Medical History:   Diagnosis Date    Chronic back pain     Headache(784.0)     Kidney stones     Osteoarthritis      Past Surgical History:   Procedure Laterality Date    ANESTHESIA NERVE BLOCK N/A 8/14/2020    NERVE BLOCK - MBB # 1 L3-4, L4-5 performed by Kelvin Christine MD at [address] [place] [address]    LITHOTRIPSY  08/2018    NERVE BLOCK  08/14/2020    NERVE BLOCK - MBB # 1 L3-4, L4-5 (N/A )     NERVE BLOCK Bilateral 08/21/2020    NERVE BLOCK - MBB #2 L3-4 L4-5 (Bilateral )     NERVE BLOCK Bilateral 8/21/2020    NERVE BLOCK - MBB #2 L3-4 L4-5 performed by Kelvin Christine MD at [address] Right 09/04/2020    NERVE RADIOFREQUENCY ABLATION - L3-4, L4-5     NERVE BLOCK Right 09/04/2020 NERVE RADIOFREQUENCY ABLATION - L3-4, L4-5 (Right )     NERVE BLOCK  09/11/2020    NERVE RADIOFREQUENCY ABLATION L3-4, L4-5     OTHER SURGICAL HISTORY      spinal stimulator placement 6-7 years ago    PAIN MANAGEMENT PROCEDURE Right 9/4/2020    NERVE RADIOFREQUENCY ABLATION - L3-4, L4-5 performed by [address], MD at [address] [facility] Left 9/11/2020    NERVE RADIOFREQUENCY ABLATION L3-4, L4-5 performed by [address], MD at [address]  01/30/2020    removal    SPINAL CORD STIMULATOR SURGERY N/A 1/30/2020    SPINAL CORD STIMULATOR IMPLANT REMOVAL, performed by Trino Mercer DO at [address]      laminectomy X2 and Spinal Cord stimulartor implant    URETER STENT PLACEMENT       Family History   Problem Relation Age of Onset    Diabetes Mother     High Blood Pressure Mother     Diabetes Father     Glaucoma Father     Eczema Father      Current Outpatient Medications on File Prior to Visit   Medication Sig Dispense Refill    pregabalin (LYRICA) 150 MG capsule Take 1 capsule by mouth 2 times daily for 30 days. 60 capsule 3    HYDROcodone-acetaminophen (NORCO) 5-325 MG per tablet Take 1 tablet by mouth every 6 hours as needed for Pain for up to 30 days. 30 tablet 0    ibuprofen (ADVIL;MOTRIN) 600 MG tablet Take 600 mg by mouth every 6 hours as needed for Pain      Handicap Placard [facility] It is my medical opinion that Kory Sabillon requires a disability parking placard for the following reasons:  He has limited walking ability due to an arthritic condition. Duration of need: permanent 1 each 0    ondansetron (ZOFRAN) 4 MG tablet TAKE ONE TABLET BY MOUTH EVERY 8 HOURS AS NEEDED FOR NAUSEA 90 tablet 0     No current facility-administered medications on file prior to visit.       Social History     Tobacco Use    Smoking status: Never Smoker    Smokeless tobacco: Never Used   Substance Use Topics    Alcohol use: Yes     Comment: rarely    Drug use: No       Allergies   Allergen Reactions    Morphine Other (See Comments)     Patient developed suicidal ideations    Fentanyl Hives       Review of Systems  Constitutional: Negative for activity change and appetite change. HENT: Negative for ear pain and facial swelling. Eyes: Negative for discharge and itching. Respiratory: Negative for choking and chest tightness. Cardiovascular: Negative for chest pain and leg swelling. Gastrointestinal: Negative for nausea and abdominal pain. Endocrine: Negative for cold intolerance and heat intolerance. Genitourinary: Negative for frequency and flank pain. Musculoskeletal: Negative for myalgias and joint swelling. Skin: Negative for rash and wound. Allergic/Immunologic: Negative for environmental allergies and food allergies. Hematological: Negative for adenopathy. Does not bruise/bleed easily. Psychiatric/Behavioral: Negative for self-injury. The patient is not nervous/anxious. Physical Exam:      BP (!) 144/75 (Site: Right Upper Arm, Position: Sitting, Cuff Size: Medium Adult)   Pulse 53   Temp 97.9 °F (36.6 °C) (Temporal)   Ht 5' 8\" (1.727 m)   Wt 192 lb (87.1 kg)   SpO2 97%   BMI 29.19 kg/m²   Estimated body mass index is 29.19 kg/m² as calculated from the following:    Height as of this encounter: 5' 8\" (1.727 m). Weight as of this encounter: 192 lb (87.1 kg). General:  Berny Longo is a 64y.o. year old male who appears his stated age. HEENT: Normocephalic atraumatic. Neck supple. Chest: regular rate; pulses equal  Abdomen: Soft nontender nondistended. Normoactive bowel sounds.   Ext: DP and PT pulses 2+, good cap refill  Neuro    Mentation  Appropriate affect  Registration intact  Orientation intact  3 item recall intact  Judgement intact to situation    Cranial Nerves:   Pupils equal and reactive to light  Extraocular motion intact  Face and shrug symmetric  Tongue midline  No dysarthria  v1-3 sensation symmetric, masseter tone symmetric  Hearing symmetric and intact to finger rub    Sensation:   Intact    Motor  L deltoid 5/5; R deltoid 5/5  L biceps 5/5; R biceps 5/5  L triceps 5/5; R triceps 5/5  L wrist extension 5/5; R wrist extension 5/5  L intrinsics 5/5; R intrinsics 5/5     L iliopsoas 5/5 , R iliopsoas 5/5  L quadriceps 5/5; R quadriceps 5/5  L Dorsiflexion 5/5; R dorsiflexion 5/5  L Plantarflexion 5/5; R plantarflexion 5/5  L EHL 5/5; R EHL 5/5    Reflexes  L Brachioradialis 2+/4; R brachioradialis 2+/4  L Biceps 2+/4; R Biceps 2+/4  L Triceps 2+/4; R Triceps 2+/4  L Patellar 2+/4: R Patellar 2+/4  L Achilles 2+/4; R Achilles 2+/4    hoffmans L: neg  hoffmans R: neg  Clonus L: neg  Clonus R: neg  Babinski L: up  Babinski R; up    Studies Review:     MRI of the lumbar spine reveals Modic changes at L4-5 with mild anterolisthesis as well as right-sided lateral recess and foraminal disease. Evidence of prior laminotomy. Also desiccation and facet hypertrophy at L3-4. Flexion extension x-rays with no instability    Assessment and Plan:      1. Other spondylosis with radiculopathy, lumbar region          Plan: Patient with persistent axial back pain 50% as well as leg pain 50%. There is no distinct pathology that I can directly correlate based on imaging to the symptomology. At this point he does have 2 levels of degenerative disease with the worst being L4-5 with some central stenosis. Distinct dermatomal pattern does not appear to correlate but could be related to some central disease. Will attempt discogram to identify ventral pain source. Followup: No follow-ups on file. Prescriptions Ordered:  No orders of the defined types were placed in this encounter. Orders Placed:  No orders of the defined types were placed in this encounter.        Electronically signed by [employer] DO Shawn on 11/11/2020 at 2:43 PM    Please note that this chart was generated using voice recognition Dragon dictation software. Although every effort was made to ensure the accuracy of this automated transcription, some errors in transcription may have occurred.

## 2020-11-19 ENCOUNTER — TELEPHONE (OUTPATIENT)
Dept: INTERVENTIONAL RADIOLOGY/VASCULAR | Age: 60
End: 2020-11-19

## 2020-12-01 ENCOUNTER — HOSPITAL ENCOUNTER (OUTPATIENT)
Dept: CT IMAGING | Age: 60
Discharge: HOME | End: 2020-12-03
Payer: COMMERCIAL

## 2020-12-01 VITALS
DIASTOLIC BLOOD PRESSURE: 85 MMHG | WEIGHT: 190 LBS | BODY MASS INDEX: 28.79 KG/M2 | RESPIRATION RATE: 18 BRPM | HEART RATE: 69 BPM | HEIGHT: 68 IN | OXYGEN SATURATION: 95 % | TEMPERATURE: 97.5 F | SYSTOLIC BLOOD PRESSURE: 122 MMHG

## 2020-12-01 PROCEDURE — 2709999900 HC NON-CHARGEABLE SUPPLY

## 2020-12-01 PROCEDURE — 72295 X-RAY OF LOWER SPINE DISK: CPT

## 2020-12-01 PROCEDURE — 62290 NJX PX DISCOGRAPHY LUMBAR: CPT

## 2020-12-01 PROCEDURE — 7100000011 HC PHASE II RECOVERY - ADDTL 15 MIN

## 2020-12-01 PROCEDURE — C1894 INTRO/SHEATH, NON-LASER: HCPCS

## 2020-12-01 PROCEDURE — 72132 CT LUMBAR SPINE W/DYE: CPT

## 2020-12-01 PROCEDURE — 7100000010 HC PHASE II RECOVERY - FIRST 15 MIN

## 2020-12-01 PROCEDURE — 6360000004 HC RX CONTRAST MEDICATION: Performed by: NEUROLOGICAL SURGERY

## 2020-12-01 RX ADMIN — IOVERSOL 15 ML: 741 INJECTION INTRA-ARTERIAL; INTRAVENOUS at 13:22

## 2020-12-01 ASSESSMENT — PAIN DESCRIPTION - DESCRIPTORS
DESCRIPTORS: BURNING
DESCRIPTORS: BURNING

## 2020-12-01 ASSESSMENT — PAIN DESCRIPTION - PAIN TYPE
TYPE: CHRONIC PAIN
TYPE: CHRONIC PAIN

## 2020-12-01 ASSESSMENT — PAIN DESCRIPTION - LOCATION
LOCATION: BACK
LOCATION: BACK

## 2020-12-01 ASSESSMENT — PAIN DESCRIPTION - ORIENTATION
ORIENTATION: LOWER
ORIENTATION: LOWER

## 2020-12-01 ASSESSMENT — PAIN SCALES - GENERAL
PAINLEVEL_OUTOF10: 3
PAINLEVEL_OUTOF10: 3

## 2020-12-01 ASSESSMENT — PAIN - FUNCTIONAL ASSESSMENT: PAIN_FUNCTIONAL_ASSESSMENT: 0-10

## 2020-12-01 NOTE — H&P
History and Physical    Pt Name: Marlene Scales  MRN: [id number]  YOB: 1960  Date of evaluation: 12/1/2020  Primary Care Physician: PAUL Zhao CNP    SUBJECTIVE:   History of Chief Complaint:    Marlene Scales is a 64 y.o. male who is scheduled today for IR lumbar discogram. He reports history of chronic back pain with prior back surgeries to include SCS which was removed in January 2020 as well as prior RFAs/ pain interventions. He rates his lumbar pain a 10/10, radiates to bilateral legs and  aggravated with bending. Allergies  is allergic to morphine and fentanyl. Medications  Prior to Admission medications    Medication Sig Start Date End Date Taking? Authorizing Provider   pregabalin (LYRICA) 150 MG capsule Take 1 capsule by mouth 2 times daily for 30 days. 11/9/20 12/9/20 Yes PAUL Zhao CNP   ibuprofen (ADVIL;MOTRIN) 600 MG tablet Take 600 mg by mouth every 6 hours as needed for Pain   Yes Historical Provider, MD   Handicap Placard [facility] [facility] It is my medical opinion that Brady Barrera requires a disability parking placard for the following reasons:  He has limited walking ability due to an arthritic condition. Duration of need: permanent 8/21/20   PAUL Zhao CNP   ondansetron (ZOFRAN) 4 MG tablet TAKE ONE TABLET BY MOUTH EVERY 8 HOURS AS NEEDED FOR NAUSEA 6/23/20   PAUL Zhao CNP     Past Medical History    has a past medical history of Chronic back pain, Headache(784.0), Kidney stones, and Osteoarthritis. Past Surgical History   has a past surgical history that includes Colonoscopy (2012); Spine surgery; Ureter stent placement; Lithotripsy (08/2018); other surgical history; Spinal Cord Stimulator Surgery (01/30/2020); Spinal Cord Stimulator Surgery (N/A, 1/30/2020); back surgery; Nerve Block (08/14/2020); Anesthesia Nerve Block (N/A, 8/14/2020); Nerve Block (Bilateral, 08/21/2020); Nerve Block (Bilateral, 8/21/2020);  Nerve Block (Right, 09/04/2020); Nerve Block (Right, 09/04/2020); Pain management procedure (Right, 9/4/2020); Nerve Block (09/11/2020); and Pain management procedure (Left, 9/11/2020). Social History   reports that he has never smoked. He has never used smokeless tobacco.   reports current alcohol use. reports no history of drug use. Marital Status    Occupation maintenance for [employer]  Family History  Family Status   Relation Name Status    Mother  Alive   Alicia Shirley Father  Alive     family history includes Diabetes in his father and mother; Eczema in his father; Glaucoma in his father; High Blood Pressure in his mother. OBJECTIVE:   VITALS:  height is 5' 8\" (1.727 m) and weight is 190 lb (86.2 kg). His temperature is 97.5 °F (36.4 °C). His blood pressure is 125/91 (abnormal) and his pulse is 79. His respiration is 18 and oxygen saturation is 96%. CONSTITUTIONAL:Alert and orientated to person, place and time. No acute distress. Friendly. Very pleasant. SKIN:  Warm & dry, no rashes on exposed skin  HEENT: HEAD: Normocephalic, atraumatic        EYES:  PERRL, EOMs intact, conjunctiva clear      EARS:  Equal bilaterally, no edema or thickening, skin is intact without lumps or lesions. No discharge. NOSE:  Nares patent, septum midline, no rhinorrhea      MOUTH/THROAT:  Mucous membranes moist, tongue is pink, uvula midline, teeth appear to be intact  NECK:  Supple, no lymphadenopathy, full ROM  LUNGS: Respirations even and non-labored. Clear to auscultation bilaterally, no wheezes/rales/rhonchi   CARDIOVASCULAR: regular rate and rhythm, no murmurs/rubs/gallops   ABDOMEN: soft, non-tender, non-distended, bowel sounds active x 4   MUSCULOSKELETAL: Full ROM bilateral upper extremities, Full ROM bilateral lower extremities. Strength of 5/5 bilateral upper extremities. Strength 5/5 bilateral lower extremities. VASCULAR:  Brisk cap refill bilateral fingers. Radial pulses are intact, 2+ bilaterally.   Dorsalis pedis pulse 2+ bilaterally. No edema or varicosities bilateral lower extremities  NEUROLOGIC: CN II-XII are grossly intact. Gait not assessed. IMPRESSIONS:   Other spondylosis with radiculopathy, lumbar region       Diagnosis Date    Chronic back pain     Headache(784.0)     Kidney stones     Osteoarthritis    1.    PLANS:   IR discogram, lumbar    CHARISMA  KENNY MILLER-CNP  Electronically signed 12/1/2020 at 12:36 PM

## 2020-12-01 NOTE — PROGRESS NOTES
Pt comes to [facility] alert. Skin warm and dry. resp easy on room air. C/o back pain. The same pain as admission. Discharge instructions reviewed with pt. Pt verb understanding.

## 2020-12-01 NOTE — BRIEF OP NOTE
Brief Postoperative Note    Sherry Wills  YOB: 1960  [id number]    Pre-operative Diagnosis: Chronic back pain    Post-operative Diagnosis: Same    Procedure: Discogram    Anesthesia: Local    Surgeons/Assistants: Any Castorena    Estimated Blood Loss: less than 50     Complications: None    Specimens: Was Not Obtained    Findings:   Injection at L2-L3: Pain is 3/10 before and 3/10 post injection. Injection at L3-L4: Pain is 3/10 before and 3/10 after injection. Injection at L4-L5: Pain is 6/10 before and 10/10 post injection.   CT to follow     Electronically signed by TAWNY Shannon on 12/1/2020 at 1:13 PM

## 2020-12-02 ENCOUNTER — HOSPITAL ENCOUNTER (OUTPATIENT)
Dept: CT IMAGING | Age: 60
Discharge: HOME | End: 2020-12-04
Payer: COMMERCIAL

## 2020-12-02 PROCEDURE — 72132 CT LUMBAR SPINE W/DYE: CPT

## 2020-12-09 ENCOUNTER — OFFICE VISIT (OUTPATIENT)
Dept: NEUROSURGERY | Age: 60
End: 2020-12-09
Payer: COMMERCIAL

## 2020-12-09 VITALS
HEART RATE: 54 BPM | DIASTOLIC BLOOD PRESSURE: 78 MMHG | WEIGHT: 190 LBS | SYSTOLIC BLOOD PRESSURE: 136 MMHG | BODY MASS INDEX: 28.89 KG/M2

## 2020-12-09 PROCEDURE — 99214 OFFICE O/P EST MOD 30 MIN: CPT | Performed by: NEUROLOGICAL SURGERY

## 2020-12-09 NOTE — PROGRESS NOTES
Musa Markham  [facility] [place] [facility] [address]  [address]  Dept: [phone]    Patient:  Danni Spencer  YOB: 1960  Date: 12/9/20    The patient is a 64 y.o. male who presents today for consult of the following problems:     Chief Complaint   Patient presents with    Back Pain     Other spondylosis with radiculopathy, lumbar region    Results     CT Lumbar Discogram-12/1             HPI:     Danni Spencer is a 64 y.o. male on whom neurosurgical consultation was requested by PAUL Sebastian CNP for management of lumbar spondylosis with radiculopathy. Persistent bilateral lower extremity pain approximate 1 distribution of the hamstring and buttock stopping appropriate fossa. No distinct discomfort into the calf or numbness and tingling of the feet. Symptoms have been progressive and refractory and exist on a daily basis preventing ambulation in worsen with changes in position. Significant axial back pain that I believe is approximately 50-50 per the terms of lower extremity to back. Pain appears exacerbated with changes in position no significant finding factors as well as failed therapy and injections previously. Jackie Balderas       History:     Past Medical History:   Diagnosis Date    Chronic back pain     Headache(784.0)     Kidney stones     Osteoarthritis      Past Surgical History:   Procedure Laterality Date    ANESTHESIA NERVE BLOCK N/A 8/14/2020    NERVE BLOCK - MBB # 1 L3-4, L4-5 performed by Jena Monroe MD at [address] [place] [address]  2012    LITHOTRIPSY  08/2018    NERVE BLOCK  08/14/2020    NERVE BLOCK - MBB # 1 L3-4, L4-5 (N/A )     NERVE BLOCK Bilateral 08/21/2020    NERVE BLOCK - MBB #2 L3-4 L4-5 (Bilateral )     NERVE BLOCK Bilateral 8/21/2020    NERVE BLOCK - MBB #2 L3-4 L4-5 performed by Jena Monroe MD at [address] Right 09/04/2020    NERVE RADIOFREQUENCY ABLATION - L3-4, L4-5     NERVE BLOCK Right 09/04/2020    NERVE RADIOFREQUENCY ABLATION - L3-4, L4-5 (Right )     NERVE BLOCK  09/11/2020    NERVE RADIOFREQUENCY ABLATION L3-4, L4-5     OTHER SURGICAL HISTORY      spinal stimulator placement 6-7 years ago    PAIN MANAGEMENT PROCEDURE Right 9/4/2020    NERVE RADIOFREQUENCY ABLATION - L3-4, L4-5 performed by Vicenta Ferguson MD at [address] [facility] Left 9/11/2020    NERVE RADIOFREQUENCY ABLATION L3-4, L4-5 performed by Vicenta Ferguson MD at [address] [facility]  01/30/2020    removal    SPINAL CORD STIMULATOR SURGERY N/A 1/30/2020    SPINAL CORD STIMULATOR IMPLANT REMOVAL, performed by Bala Burns DO at [address]      laminectomy X2 and Spinal Cord stimulartor implant    URETER STENT PLACEMENT       Family History   Problem Relation Age of Onset    Diabetes Mother     High Blood Pressure Mother     Diabetes Father     Glaucoma Father     Eczema Father      Current Outpatient Medications on File Prior to Visit   Medication Sig Dispense Refill    pregabalin (LYRICA) 150 MG capsule Take 1 capsule by mouth 2 times daily for 30 days. 60 capsule 3    ibuprofen (ADVIL;MOTRIN) 600 MG tablet Take 600 mg by mouth every 6 hours as needed for Pain      Handicap Placard [facility] It is my medical opinion that Cecily Garnett requires a disability parking placard for the following reasons:  He has limited walking ability due to an arthritic condition. Duration of need: permanent 1 each 0    ondansetron (ZOFRAN) 4 MG tablet TAKE ONE TABLET BY MOUTH EVERY 8 HOURS AS NEEDED FOR NAUSEA 90 tablet 0     No current facility-administered medications on file prior to visit.       Social History     Tobacco Use    Smoking status: Never Smoker    Smokeless tobacco: Never Used   Substance Use Topics    Alcohol use: Yes     Comment: rarely    Drug use: No       Allergies   Allergen Reactions    Morphine Other (See Comments)     Patient developed suicidal ideations    Fentanyl Hives       Review of Systems  Constitutional: Negative for activity change and appetite change. HENT: Negative for ear pain and facial swelling. Eyes: Negative for discharge and itching. Respiratory: Negative for choking and chest tightness. Cardiovascular: Negative for chest pain and leg swelling. Gastrointestinal: Negative for nausea and abdominal pain. Endocrine: Negative for cold intolerance and heat intolerance. Genitourinary: Negative for frequency and flank pain. Musculoskeletal: Negative for myalgias and joint swelling. Skin: Negative for rash and wound. Allergic/Immunologic: Negative for environmental allergies and food allergies. Hematological: Negative for adenopathy. Does not bruise/bleed easily. Psychiatric/Behavioral: Negative for self-injury. The patient is not nervous/anxious. Physical Exam:      BP (!) 143/76 (Site: Right Upper Arm, Position: Sitting, Cuff Size: Medium Adult)   Pulse 57   Wt 190 lb (86.2 kg) Comment: LKW  BMI 28.89 kg/m²   Estimated body mass index is 28.89 kg/m² as calculated from the following:    Height as of 12/1/20: 5' 8\" (1.727 m). Weight as of this encounter: 190 lb (86.2 kg). General:  Nanic Wing is a 64y.o. year old male who appears his stated age. HEENT: Normocephalic atraumatic. Neck supple. Chest: regular rate; pulses equal  Abdomen: Soft nontender nondistended. Normoactive bowel sounds.   Ext: DP and PT pulses 2+, good cap refill  Neuro    Mentation  Appropriate affect  Registration intact  Orientation intact  3 item recall intact  Judgement intact to situation    Cranial Nerves:   Pupils equal and reactive to light  Extraocular motion intact  Face and shrug symmetric  Tongue midline  No dysarthria  v1-3 sensation symmetric, masseter tone symmetric  Hearing symmetric and intact to finger rub    Sensation:   Intact    Motor  L deltoid 5/5; R deltoid 5/5  L biceps 5/5; R biceps 5/5  L triceps 5/5; R triceps 5/5  L wrist extension 5/5; R wrist extension 5/5  L intrinsics 5/5; R intrinsics 5/5     L iliopsoas 5/5 , R iliopsoas 5/5  L quadriceps 5/5; R quadriceps 5/5  L Dorsiflexion 5/5; R dorsiflexion 5/5  L Plantarflexion 5/5; R plantarflexion 5/5  L EHL 5/5; R EHL 5/5    Reflexes  L Brachioradialis 2+/4; R brachioradialis 2+/4  L Biceps 2+/4; R Biceps 2+/4  L Triceps 2+/4; R Triceps 2+/4  L Patellar 2+/4: R Patellar 2+/4  L Achilles 2+/4; R Achilles 2+/4    hoffmans L: neg  hoffmans R: neg  Clonus L: neg  Clonus R: neg  Babinski L: up  Babinski R; up    Studies Review:     CT myelogram did have a distinct trigger at L4-5 as well has extravasation of contrast into the posterior subligamentous region. MRI lumbar spine with scarring at the L4-5 level as well as significant stenosis related to 5 foramen hypertrophy at L3-4 as well as right side L4-5. No distinct instability on flexion-extension x-rays evident. Assessment and Plan:      1. Other spondylosis with radiculopathy, lumbar region          Plan: Extensive discussion regarding the consolation of radicular symptomology as well as axial back pain. I did distinctly play out the approach to lumbar spondylosis which is that we treat lumbar radiculopathy and neural compression with decompression and fusion as necessary. In this setting he does have isolated disease predominate L4-5 both on MRI as well as axial back pain that appears to correlate. Most importantly he did have a CT discogram that appeared to correlate with the L4-5 level with axial trigger versus the above levels. Considering significant central stenosis at L3-4 as well as L4-5 would recommend decompression at L4-5 with complete facetectomy bilaterally as well as undercutting L3-4 to remove flavum hypertrophy and neural compression.   Fusion I believe is only warranted at the L4-5 level considering that there is a complete facetectomy of the left side, consistent discogram with axial pain and risk of progressive instability in the setting of right-sided decompression. I did offer him L4-5 posterior fusion with decompression neurolysis of the left side along with decompression at L3-4. Significant risks include CSF leak nerve injury related to significant scarring of the left side requiring neural lysis. Explained the risk of CSF leak includes possible 5-day hospital stay with flat bedrest and lumbar drainage. Overall my confidence interval for the lower extremity pain is significant considering that we can localize the central stenosis at L3-4 as well as the right side. Prior scarring on the L4-5 side on the left does concern me for the ability to decompress those nerves and provide relief of radicular pain in the left leg. Axial pain all no we have been able to localize it with good confidence using a combination of a discogram and clinical symptoms, I did explain that it is not often a common goal in terms of lumbar fusion. Followup: No follow-ups on file. Prescriptions Ordered:  No orders of the defined types were placed in this encounter. Orders Placed:  No orders of the defined types were placed in this encounter. Electronically signed by Trino Mercer DO on 12/9/2020 at 3:09 PM    Please note that this chart was generated using voice recognition Dragon dictation software. Although every effort was made to ensure the accuracy of this automated transcription, some errors in transcription may have occurred.

## 2021-03-02 ENCOUNTER — TELEPHONE (OUTPATIENT)
Dept: NEUROLOGY | Age: 61
End: 2021-03-02

## 2021-03-02 NOTE — TELEPHONE ENCOUNTER
Patient's wife reached out and stated patient would like to go ahead with surgery. Please advise on surgery to be booked.  No problem

## 2021-03-15 ENCOUNTER — IMMUNIZATION (OUTPATIENT)
Dept: PRIMARY CARE CLINIC | Age: 61
End: 2021-03-15
Payer: COMMERCIAL

## 2021-03-15 PROCEDURE — 0031A COVID-19, J&J VACCINE, PF, 0.5 ML DOSE: CPT | Performed by: INTERNAL MEDICINE

## 2021-03-15 PROCEDURE — 91303 COVID-19, J&J VACCINE, PF, 0.5 ML DOSE: CPT | Performed by: INTERNAL MEDICINE

## 2021-03-19 RX ORDER — SODIUM CHLORIDE, SODIUM LACTATE, POTASSIUM CHLORIDE, CALCIUM CHLORIDE 600; 310; 30; 20 MG/100ML; MG/100ML; MG/100ML; MG/100ML
1000 INJECTION, SOLUTION INTRAVENOUS CONTINUOUS
Status: CANCELLED | OUTPATIENT
Start: 2021-03-19

## 2021-03-23 ENCOUNTER — HOSPITAL ENCOUNTER (OUTPATIENT)
Dept: PREADMISSION TESTING | Age: 61
Discharge: HOME | End: 2021-03-27
Payer: COMMERCIAL

## 2021-03-23 VITALS
OXYGEN SATURATION: 98 % | HEIGHT: 68 IN | BODY MASS INDEX: 29.1 KG/M2 | TEMPERATURE: 97.3 F | HEART RATE: 62 BPM | DIASTOLIC BLOOD PRESSURE: 79 MMHG | SYSTOLIC BLOOD PRESSURE: 142 MMHG | RESPIRATION RATE: 16 BRPM | WEIGHT: 192 LBS

## 2021-03-23 LAB
ANION GAP SERPL CALCULATED.3IONS-SCNC: 9 MMOL/L (ref 9–17)
BUN BLDV-MCNC: 13 MG/DL (ref 6–20)
CHLORIDE BLD-SCNC: 105 MMOL/L (ref 98–107)
CO2: 25 MMOL/L (ref 20–31)
CREAT SERPL-MCNC: 0.75 MG/DL (ref 0.7–1.2)
GFR AFRICAN AMERICAN: >60 ML/MIN
GFR NON-AFRICAN AMERICAN: >60 ML/MIN
GFR SERPL CREATININE-BSD FRML MDRD: NORMAL ML/MIN/{1.73_M2}
GFR SERPL CREATININE-BSD FRML MDRD: NORMAL ML/MIN/{1.73_M2}
GLUCOSE BLD-MCNC: 189 MG/DL (ref 70–99)
HCT VFR BLD CALC: 47 % (ref 40.7–50.3)
HEMOGLOBIN: 15.7 G/DL (ref 13–17)
MCH RBC QN AUTO: 31.4 PG (ref 25.2–33.5)
MCHC RBC AUTO-ENTMCNC: 33.4 G/DL (ref 28.4–34.8)
MCV RBC AUTO: 94 FL (ref 82.6–102.9)
NRBC AUTOMATED: 0 PER 100 WBC
PDW BLD-RTO: 12.1 % (ref 11.8–14.4)
PLATELET # BLD: 227 K/UL (ref 138–453)
PMV BLD AUTO: 9.7 FL (ref 8.1–13.5)
POTASSIUM SERPL-SCNC: 4.1 MMOL/L (ref 3.7–5.3)
RBC # BLD: 5 M/UL (ref 4.21–5.77)
SODIUM BLD-SCNC: 139 MMOL/L (ref 135–144)
WBC # BLD: 5.3 K/UL (ref 3.5–11.3)

## 2021-03-23 PROCEDURE — 85027 COMPLETE CBC AUTOMATED: CPT

## 2021-03-23 PROCEDURE — 80051 ELECTROLYTE PANEL: CPT

## 2021-03-23 PROCEDURE — 93005 ELECTROCARDIOGRAM TRACING: CPT | Performed by: ANESTHESIOLOGY

## 2021-03-23 PROCEDURE — 84520 ASSAY OF UREA NITROGEN: CPT

## 2021-03-23 PROCEDURE — 82947 ASSAY GLUCOSE BLOOD QUANT: CPT

## 2021-03-23 PROCEDURE — 86850 RBC ANTIBODY SCREEN: CPT

## 2021-03-23 PROCEDURE — 82565 ASSAY OF CREATININE: CPT

## 2021-03-23 PROCEDURE — 36415 COLL VENOUS BLD VENIPUNCTURE: CPT

## 2021-03-23 PROCEDURE — 86900 BLOOD TYPING SEROLOGIC ABO: CPT

## 2021-03-23 PROCEDURE — 86901 BLOOD TYPING SEROLOGIC RH(D): CPT

## 2021-03-23 RX ORDER — HYDROCODONE BITARTRATE AND ACETAMINOPHEN 5; 325 MG/1; MG/1
1 TABLET ORAL EVERY 6 HOURS PRN
COMMUNITY
End: 2021-04-05

## 2021-03-23 ASSESSMENT — PAIN DESCRIPTION - LOCATION: LOCATION: BACK

## 2021-03-23 ASSESSMENT — PAIN DESCRIPTION - ORIENTATION: ORIENTATION: LOWER

## 2021-03-23 NOTE — PROGRESS NOTES
Anesthesia Focused Assessment    Has patient ever tested positive for COVID? No.  Patient had J&J COVID vaccine last week. STOP-BANG Sleep Apnea Questionnaire    SNORE loudly (heard through closed doors)? Yes  TIRED, fatigued, sleepy during daytime? No  OBSERVED stopping breathing during sleep? No  High blood PRESSURE being treated? No    BMI over 35? No  AGE over 48? Yes  NECK circumference over 16\"? No  GENDER (male)? Yes             Total 3  High risk 5-8  Intermediate risk 3-4  Low risk 0-2    Obstructive Sleep Apnea: snores but denies apnea  If YES, machine used: no     Type 1 DM:   no  T2DM:  no    Coronary Artery Disease:  no  Hypertension:  no    Active smoker:  no  Drinks Alcohol:  rarely    Dentition: molar crown x 1    Defib / AICD / Pacemaker: no      Renal Failure/dialysis:  no    Patient was evaluated in PAT & anesthesia guidelines were applied. NPO guidelines, medication instructions and scheduled arrival time were reviewed with patient.     Hx of anesthesia complications:  no  Family hx of anesthesia complications:  no                                                                                                                     Anesthesia contacted:   no  Medical or cardiac clearance ordered: no    VALENTÍN DAVIS PA-C  3/23/21  8:53 AM

## 2021-03-23 NOTE — H&P
History and Physical    Pt Name: Becky Coates  MRN: [id number]  YOB: 1960  Date of evaluation: 3/23/2021    SUBJECTIVE:   History of Chief Complaint:    Patient presents for PAT appointment, complains of chronic lumbar pain. He has had multiple procedures in the past, including lumbar laminectomy, nerve blocks, spinal cord stimulator placed and removed, etc.  He currently has lumbar pain, BLE pain with numbness/tingling. He has been scheduled for lumbar fusion. Past Medical History    has a past medical history of Chronic back pain, Headache(784.0), Kidney stones, Osteoarthritis, Snores, Wellness examination, Wellness examination, and Wellness examination. Past Surgical History   has a past surgical history that includes Colonoscopy (2012); lumbar laminectomy; Ureter stent placement; Lithotripsy (08/2018); Spinal Cord Stimulator Surgery (01/30/2020); Spinal Cord Stimulator Surgery (N/A, 01/30/2020); Anesthesia Nerve Block (N/A, 08/14/2020); Nerve Block (Bilateral, 08/21/2020); Pain management procedure (Right, 09/04/2020); Pain management procedure (Left, 09/11/2020); and back surgery. Medications  Prior to Admission medications    Medication Sig Start Date End Date Taking? Authorizing Provider   HYDROcodone-acetaminophen (NORCO) 5-325 MG per tablet Take 1 tablet by mouth every 6 hours as needed for Pain. Yes Historical Provider, MD   pregabalin (LYRICA) 150 MG capsule Take 1 capsule by mouth 2 times daily for 30 days.  3/9/21 4/8/21 Yes PAUL Boss CNP   ibuprofen (ADVIL;MOTRIN) 600 MG tablet Take 1 tablet by mouth every 6 hours as needed for Pain 12/23/20  Yes PAUL Boss CNP   ondansetron (ZOFRAN) 4 MG tablet TAKE ONE TABLET BY MOUTH EVERY 8 HOURS AS NEEDED FOR NAUSEA 6/23/20  Yes PAUL Boss CNP   Handicap Placard [facility] [place] It is my medical opinion that Katie Bustillos requires a disability parking placard for the following reasons:  He has limited walking ability

## 2021-03-23 NOTE — H&P (VIEW-ONLY)
due to an arthritic condition. Duration of need: permanent 20   Maurice Sellar, APRN - CNP     Allergies  is allergic to morphine and fentanyl. Family History  family history includes Diabetes in his father and mother; Eczema in his father; Glaucoma in his father; High Blood Pressure in his mother. Social History   reports that he has never smoked. He has never used smokeless tobacco.   reports current alcohol use. reports no history of drug use. Marital Status   Occupation works for [employer]:   VITALS:  height is 5' 8\" (1.727 m) and weight is 192 lb (87.1 kg). His temporal temperature is 97.3 °F (36.3 °C). His blood pressure is 142/79 (abnormal) and his pulse is 62. His respiration is 16 and oxygen saturation is 98%. CONSTITUTIONAL:alert & oriented x 3, no acute distress. SKIN:  Warm and dry, no rashes on exposed areas of skin   HEAD:  Normocephalic, atraumatic   EYES: PERRL. EOMs intact. EARS:  Hearing grossly WNL. NOSE:  Nares patent. No rhinorrhea. MOUTH/THROAT:  benign  NECK:supple, no lymphadenopathy  LUNGS: Clear to auscultation bilaterally, no wheezes. CARDIOVASCULAR: Heart sounds are normal.  Regular rate and rhythm without murmur. ABDOMEN: soft, non tender, non distended. EXTREMITIES: no edema bilateral lower extremities. Testing:   EK21  Labs pending: drawn 3/23/2021     IMPRESSIONS:   1. Lumbar disc disease  2.  has a past medical history of Chronic back pain, Headache(784.0), Kidney stones, Osteoarthritis, Snores, Wellness examination (2021), Wellness examination (2021), and Wellness examination (2021). PLANS:   1.  Lumbar fusion    VALENTÍN DAVSI PA-C  Electronically signed 3/23/2021 at 9:37 AM

## 2021-03-24 LAB
EKG ATRIAL RATE: 61 BPM
EKG P AXIS: 72 DEGREES
EKG P-R INTERVAL: 156 MS
EKG Q-T INTERVAL: 418 MS
EKG QRS DURATION: 102 MS
EKG QTC CALCULATION (BAZETT): 420 MS
EKG R AXIS: -3 DEGREES
EKG T AXIS: 7 DEGREES
EKG VENTRICULAR RATE: 61 BPM

## 2021-03-24 PROCEDURE — 93010 ELECTROCARDIOGRAM REPORT: CPT | Performed by: INTERNAL MEDICINE

## 2021-04-02 ENCOUNTER — HOSPITAL ENCOUNTER (OUTPATIENT)
Dept: LAB | Age: 61
Setting detail: SPECIMEN
Discharge: HOME | End: 2021-04-02
Payer: COMMERCIAL

## 2021-04-02 DIAGNOSIS — Z01.818 PRE-OP TESTING: Primary | ICD-10-CM

## 2021-04-02 PROCEDURE — U0003 INFECTIOUS AGENT DETECTION BY NUCLEIC ACID (DNA OR RNA); SEVERE ACUTE RESPIRATORY SYNDROME CORONAVIRUS 2 (SARS-COV-2) (CORONAVIRUS DISEASE [COVID-19]), AMPLIFIED PROBE TECHNIQUE, MAKING USE OF HIGH THROUGHPUT TECHNOLOGIES AS DESCRIBED BY CMS-2020-01-R: HCPCS

## 2021-04-02 PROCEDURE — U0005 INFEC AGEN DETEC AMPLI PROBE: HCPCS

## 2021-04-04 LAB
SARS-COV-2: ABNORMAL
SARS-COV-2: DETECTED
SOURCE: ABNORMAL

## 2021-04-05 LAB
ABO/RH: NORMAL
ANTIBODY SCREEN: NEGATIVE
ARM BAND NUMBER: NORMAL
EXPIRATION DATE: NORMAL

## 2021-04-13 ENCOUNTER — HOSPITAL ENCOUNTER (OUTPATIENT)
Dept: PREADMISSION TESTING | Age: 61
Discharge: HOME | DRG: 454 | End: 2021-04-17
Payer: COMMERCIAL

## 2021-04-13 VITALS
BODY MASS INDEX: 28.79 KG/M2 | RESPIRATION RATE: 16 BRPM | OXYGEN SATURATION: 96 % | SYSTOLIC BLOOD PRESSURE: 147 MMHG | HEIGHT: 68 IN | HEART RATE: 70 BPM | DIASTOLIC BLOOD PRESSURE: 78 MMHG | WEIGHT: 190 LBS | TEMPERATURE: 97.3 F

## 2021-04-13 ASSESSMENT — PAIN DESCRIPTION - DESCRIPTORS: DESCRIPTORS: ACHING;CONSTANT

## 2021-04-13 ASSESSMENT — PAIN DESCRIPTION - PAIN TYPE: TYPE: CHRONIC PAIN

## 2021-04-13 ASSESSMENT — PAIN DESCRIPTION - FREQUENCY: FREQUENCY: CONTINUOUS

## 2021-04-13 ASSESSMENT — PAIN DESCRIPTION - LOCATION: LOCATION: BACK

## 2021-04-14 ENCOUNTER — ANESTHESIA EVENT (OUTPATIENT)
Dept: OPERATING ROOM | Age: 61
DRG: 454 | End: 2021-04-14
Payer: COMMERCIAL

## 2021-04-15 ENCOUNTER — APPOINTMENT (OUTPATIENT)
Dept: GENERAL RADIOLOGY | Age: 61
DRG: 454 | End: 2021-04-15
Attending: NEUROLOGICAL SURGERY
Payer: COMMERCIAL

## 2021-04-15 ENCOUNTER — HOSPITAL ENCOUNTER (INPATIENT)
Age: 61
LOS: 3 days | Discharge: HOME | DRG: 454 | End: 2021-04-18
Attending: NEUROLOGICAL SURGERY | Admitting: NEUROLOGICAL SURGERY
Payer: COMMERCIAL

## 2021-04-15 ENCOUNTER — ANESTHESIA (OUTPATIENT)
Dept: OPERATING ROOM | Age: 61
DRG: 454 | End: 2021-04-15
Payer: COMMERCIAL

## 2021-04-15 VITALS — DIASTOLIC BLOOD PRESSURE: 71 MMHG | SYSTOLIC BLOOD PRESSURE: 143 MMHG | OXYGEN SATURATION: 100 % | TEMPERATURE: 98.1 F

## 2021-04-15 DIAGNOSIS — M47.26 OTHER SPONDYLOSIS WITH RADICULOPATHY, LUMBAR REGION: ICD-10-CM

## 2021-04-15 DIAGNOSIS — Z98.1 S/P LUMBAR FUSION: Primary | ICD-10-CM

## 2021-04-15 PROCEDURE — 7100000000 HC PACU RECOVERY - FIRST 15 MIN: Performed by: NEUROLOGICAL SURGERY

## 2021-04-15 PROCEDURE — 3600000014 HC SURGERY LEVEL 4 ADDTL 15MIN: Performed by: NEUROLOGICAL SURGERY

## 2021-04-15 PROCEDURE — 2780000010 HC IMPLANT OTHER: Performed by: NEUROLOGICAL SURGERY

## 2021-04-15 PROCEDURE — 7100000001 HC PACU RECOVERY - ADDTL 15 MIN: Performed by: NEUROLOGICAL SURGERY

## 2021-04-15 PROCEDURE — 2580000003 HC RX 258: Performed by: REGISTERED NURSE

## 2021-04-15 PROCEDURE — APPSS30 APP SPLIT SHARED TIME 16-30 MINUTES: Performed by: REGISTERED NURSE

## 2021-04-15 PROCEDURE — 22614 ARTHRD PST TQ 1NTRSPC EA ADD: CPT | Performed by: NEUROLOGICAL SURGERY

## 2021-04-15 PROCEDURE — 1200000000 HC SEMI PRIVATE

## 2021-04-15 PROCEDURE — 6360000002 HC RX W HCPCS: Performed by: NURSE ANESTHETIST, CERTIFIED REGISTERED

## 2021-04-15 PROCEDURE — 6360000002 HC RX W HCPCS: Performed by: REGISTERED NURSE

## 2021-04-15 PROCEDURE — C1713 ANCHOR/SCREW BN/BN,TIS/BN: HCPCS | Performed by: NEUROLOGICAL SURGERY

## 2021-04-15 PROCEDURE — 2500000003 HC RX 250 WO HCPCS: Performed by: NEUROLOGICAL SURGERY

## 2021-04-15 PROCEDURE — C1762 CONN TISS, HUMAN(INC FASCIA): HCPCS | Performed by: NEUROLOGICAL SURGERY

## 2021-04-15 PROCEDURE — 3209999900 FLUORO FOR SURGICAL PROCEDURES

## 2021-04-15 PROCEDURE — 22842 INSERT SPINE FIXATION DEVICE: CPT | Performed by: NEUROLOGICAL SURGERY

## 2021-04-15 PROCEDURE — 6360000002 HC RX W HCPCS: Performed by: NEUROLOGICAL SURGERY

## 2021-04-15 PROCEDURE — 6360000002 HC RX W HCPCS: Performed by: ANESTHESIOLOGY

## 2021-04-15 PROCEDURE — 6370000000 HC RX 637 (ALT 250 FOR IP): Performed by: REGISTERED NURSE

## 2021-04-15 PROCEDURE — 22853 INSJ BIOMECHANICAL DEVICE: CPT | Performed by: NEUROLOGICAL SURGERY

## 2021-04-15 PROCEDURE — 3700000000 HC ANESTHESIA ATTENDED CARE: Performed by: NEUROLOGICAL SURGERY

## 2021-04-15 PROCEDURE — 63047 LAM FACETEC & FORAMOT LUMBAR: CPT | Performed by: NEUROLOGICAL SURGERY

## 2021-04-15 PROCEDURE — 0SG10K1 FUSION OF 2 OR MORE LUMBAR VERTEBRAL JOINTS WITH NONAUTOLOGOUS TISSUE SUBSTITUTE, POSTERIOR APPROACH, POSTERIOR COLUMN, OPEN APPROACH: ICD-10-PCS | Performed by: NEUROLOGICAL SURGERY

## 2021-04-15 PROCEDURE — 6370000000 HC RX 637 (ALT 250 FOR IP): Performed by: NEUROLOGICAL SURGERY

## 2021-04-15 PROCEDURE — 3700000001 HC ADD 15 MINUTES (ANESTHESIA): Performed by: NEUROLOGICAL SURGERY

## 2021-04-15 PROCEDURE — 2580000003 HC RX 258: Performed by: ANESTHESIOLOGY

## 2021-04-15 PROCEDURE — 0SB20ZZ EXCISION OF LUMBAR VERTEBRAL DISC, OPEN APPROACH: ICD-10-PCS | Performed by: NEUROLOGICAL SURGERY

## 2021-04-15 PROCEDURE — 2709999900 HC NON-CHARGEABLE SUPPLY: Performed by: NEUROLOGICAL SURGERY

## 2021-04-15 PROCEDURE — 22633 ARTHRD CMBN 1NTRSPC LUMBAR: CPT | Performed by: NEUROLOGICAL SURGERY

## 2021-04-15 PROCEDURE — 2720000010 HC SURG SUPPLY STERILE: Performed by: NEUROLOGICAL SURGERY

## 2021-04-15 PROCEDURE — 2580000003 HC RX 258: Performed by: NEUROLOGICAL SURGERY

## 2021-04-15 PROCEDURE — 01NB0ZZ RELEASE LUMBAR NERVE, OPEN APPROACH: ICD-10-PCS | Performed by: NEUROLOGICAL SURGERY

## 2021-04-15 PROCEDURE — 0SG00AJ FUSION OF LUMBAR VERTEBRAL JOINT WITH INTERBODY FUSION DEVICE, POSTERIOR APPROACH, ANTERIOR COLUMN, OPEN APPROACH: ICD-10-PCS | Performed by: NEUROLOGICAL SURGERY

## 2021-04-15 PROCEDURE — 6360000002 HC RX W HCPCS

## 2021-04-15 PROCEDURE — 3600000004 HC SURGERY LEVEL 4 BASE: Performed by: NEUROLOGICAL SURGERY

## 2021-04-15 PROCEDURE — 6370000000 HC RX 637 (ALT 250 FOR IP): Performed by: ANESTHESIOLOGY

## 2021-04-15 PROCEDURE — 00UT0KZ SUPPLEMENT SPINAL MENINGES WITH NONAUTOLOGOUS TISSUE SUBSTITUTE, OPEN APPROACH: ICD-10-PCS | Performed by: NEUROLOGICAL SURGERY

## 2021-04-15 PROCEDURE — 2500000003 HC RX 250 WO HCPCS: Performed by: NURSE ANESTHETIST, CERTIFIED REGISTERED

## 2021-04-15 DEVICE — SCREW 54840008545 MAS 8.5X45 CC
Type: IMPLANTABLE DEVICE | Site: VERTEBRAE | Status: FUNCTIONAL
Brand: CD HORIZON® SPINAL SYSTEM

## 2021-04-15 DEVICE — COLLAGEN DURAL REGENERATION MEMBRANE 1IN X 1IN (2.5CM X 2.5CM)
Type: IMPLANTABLE DEVICE | Status: FUNCTIONAL
Brand: DURAMATRIX-ONLAY PLUS

## 2021-04-15 DEVICE — SPACER 56351320 12W 35MM X 13MM 20 DG TI
Type: IMPLANTABLE DEVICE | Site: VERTEBRAE | Status: FUNCTIONAL
Brand: ARTIC-L™ 3D TI SPINAL SYSTEM WITH TIONIC™ TECHNOLOGY

## 2021-04-15 DEVICE — GRAFT BNE VIABLE 1 CC TIM MTRX FIBERCEL: Type: IMPLANTABLE DEVICE | Site: BACK | Status: FUNCTIONAL

## 2021-04-15 DEVICE — DBM 7509215 MAGNIFUSE 1 X 5CM
Type: IMPLANTABLE DEVICE | Status: FUNCTIONAL
Brand: MAGNIFUSE® BONE GRAFT

## 2021-04-15 DEVICE — ROD 1475501070 4.75 CCM NS CURV 70MM
Type: IMPLANTABLE DEVICE | Site: VERTEBRAE | Status: FUNCTIONAL
Brand: CD HORIZON® SPINAL SYSTEM

## 2021-04-15 DEVICE — SET SCREW 5440030 4.75 TI NS BRK OFF
Type: IMPLANTABLE DEVICE | Site: VERTEBRAE | Status: FUNCTIONAL
Brand: CD HORIZON® SPINAL SYSTEM

## 2021-04-15 DEVICE — SCREW 54840008540 MAS 8.5X40 CC
Type: IMPLANTABLE DEVICE | Site: VERTEBRAE | Status: FUNCTIONAL
Brand: CD HORIZON® SPINAL SYSTEM

## 2021-04-15 DEVICE — SCREW 54840007550 MAS 7.5X50 CC
Type: IMPLANTABLE DEVICE | Site: VERTEBRAE | Status: FUNCTIONAL
Brand: CD HORIZON® SPINAL SYSTEM

## 2021-04-15 RX ORDER — EPHEDRINE SULFATE/0.9% NACL/PF 50 MG/5 ML
SYRINGE (ML) INTRAVENOUS PRN
Status: DISCONTINUED | OUTPATIENT
Start: 2021-04-15 | End: 2021-04-15 | Stop reason: SDUPTHER

## 2021-04-15 RX ORDER — ACETAMINOPHEN 650 MG
TABLET, EXTENDED RELEASE ORAL PRN
Status: DISCONTINUED | OUTPATIENT
Start: 2021-04-15 | End: 2021-04-15 | Stop reason: ALTCHOICE

## 2021-04-15 RX ORDER — LIDOCAINE HYDROCHLORIDE AND EPINEPHRINE 10; 10 MG/ML; UG/ML
INJECTION, SOLUTION INFILTRATION; PERINEURAL PRN
Status: DISCONTINUED | OUTPATIENT
Start: 2021-04-15 | End: 2021-04-15 | Stop reason: ALTCHOICE

## 2021-04-15 RX ORDER — NEOSTIGMINE METHYLSULFATE 5 MG/5 ML
SYRINGE (ML) INTRAVENOUS PRN
Status: DISCONTINUED | OUTPATIENT
Start: 2021-04-15 | End: 2021-04-15 | Stop reason: SDUPTHER

## 2021-04-15 RX ORDER — ONDANSETRON 2 MG/ML
INJECTION INTRAMUSCULAR; INTRAVENOUS PRN
Status: DISCONTINUED | OUTPATIENT
Start: 2021-04-15 | End: 2021-04-15 | Stop reason: SDUPTHER

## 2021-04-15 RX ORDER — MAGNESIUM HYDROXIDE 1200 MG/15ML
LIQUID ORAL CONTINUOUS PRN
Status: COMPLETED | OUTPATIENT
Start: 2021-04-15 | End: 2021-04-15

## 2021-04-15 RX ORDER — HYDRALAZINE HYDROCHLORIDE 20 MG/ML
5 INJECTION INTRAMUSCULAR; INTRAVENOUS EVERY 10 MIN PRN
Status: DISCONTINUED | OUTPATIENT
Start: 2021-04-15 | End: 2021-04-15 | Stop reason: HOSPADM

## 2021-04-15 RX ORDER — SODIUM CHLORIDE 9 MG/ML
INJECTION, SOLUTION INTRAVENOUS CONTINUOUS
Status: DISCONTINUED | OUTPATIENT
Start: 2021-04-15 | End: 2021-04-16

## 2021-04-15 RX ORDER — KETAMINE HYDROCHLORIDE 10 MG/ML
INJECTION, SOLUTION INTRAMUSCULAR; INTRAVENOUS PRN
Status: DISCONTINUED | OUTPATIENT
Start: 2021-04-15 | End: 2021-04-15 | Stop reason: SDUPTHER

## 2021-04-15 RX ORDER — PHENYLEPHRINE HYDROCHLORIDE 10 MG/ML
INJECTION INTRAVENOUS PRN
Status: DISCONTINUED | OUTPATIENT
Start: 2021-04-15 | End: 2021-04-15 | Stop reason: SDUPTHER

## 2021-04-15 RX ORDER — SCOLOPAMINE TRANSDERMAL SYSTEM 1 MG/1
1 PATCH, EXTENDED RELEASE TRANSDERMAL ONCE
Status: DISCONTINUED | OUTPATIENT
Start: 2021-04-15 | End: 2021-04-18 | Stop reason: HOSPADM

## 2021-04-15 RX ORDER — SODIUM CHLORIDE 0.9 % (FLUSH) 0.9 %
5-40 SYRINGE (ML) INJECTION EVERY 12 HOURS SCHEDULED
Status: DISCONTINUED | OUTPATIENT
Start: 2021-04-15 | End: 2021-04-18 | Stop reason: HOSPADM

## 2021-04-15 RX ORDER — ONDANSETRON 2 MG/ML
4 INJECTION INTRAMUSCULAR; INTRAVENOUS EVERY 6 HOURS PRN
Status: DISCONTINUED | OUTPATIENT
Start: 2021-04-15 | End: 2021-04-18 | Stop reason: HOSPADM

## 2021-04-15 RX ORDER — SODIUM CHLORIDE, SODIUM LACTATE, POTASSIUM CHLORIDE, CALCIUM CHLORIDE 600; 310; 30; 20 MG/100ML; MG/100ML; MG/100ML; MG/100ML
1000 INJECTION, SOLUTION INTRAVENOUS CONTINUOUS
Status: DISCONTINUED | OUTPATIENT
Start: 2021-04-15 | End: 2021-04-15

## 2021-04-15 RX ORDER — PROMETHAZINE HYDROCHLORIDE 25 MG/ML
6.25 INJECTION, SOLUTION INTRAMUSCULAR; INTRAVENOUS
Status: COMPLETED | OUTPATIENT
Start: 2021-04-15 | End: 2021-04-15

## 2021-04-15 RX ORDER — ACETAMINOPHEN 325 MG/1
650 TABLET ORAL EVERY 6 HOURS
Status: DISCONTINUED | OUTPATIENT
Start: 2021-04-15 | End: 2021-04-18 | Stop reason: HOSPADM

## 2021-04-15 RX ORDER — SODIUM CHLORIDE 9 MG/ML
25 INJECTION, SOLUTION INTRAVENOUS PRN
Status: DISCONTINUED | OUTPATIENT
Start: 2021-04-15 | End: 2021-04-18 | Stop reason: HOSPADM

## 2021-04-15 RX ORDER — OXYCODONE HYDROCHLORIDE 5 MG/1
10 TABLET ORAL EVERY 4 HOURS PRN
Status: DISCONTINUED | OUTPATIENT
Start: 2021-04-15 | End: 2021-04-18 | Stop reason: HOSPADM

## 2021-04-15 RX ORDER — FAMOTIDINE 20 MG/1
20 TABLET, FILM COATED ORAL 2 TIMES DAILY
Status: DISCONTINUED | OUTPATIENT
Start: 2021-04-15 | End: 2021-04-18 | Stop reason: HOSPADM

## 2021-04-15 RX ORDER — OXYCODONE HYDROCHLORIDE 5 MG/1
5 TABLET ORAL EVERY 4 HOURS PRN
Status: DISCONTINUED | OUTPATIENT
Start: 2021-04-15 | End: 2021-04-18 | Stop reason: HOSPADM

## 2021-04-15 RX ORDER — GLYCOPYRROLATE 1 MG/5 ML
SYRINGE (ML) INTRAVENOUS PRN
Status: DISCONTINUED | OUTPATIENT
Start: 2021-04-15 | End: 2021-04-15 | Stop reason: SDUPTHER

## 2021-04-15 RX ORDER — SENNA AND DOCUSATE SODIUM 50; 8.6 MG/1; MG/1
1 TABLET, FILM COATED ORAL 2 TIMES DAILY
Status: DISCONTINUED | OUTPATIENT
Start: 2021-04-15 | End: 2021-04-18 | Stop reason: HOSPADM

## 2021-04-15 RX ORDER — HYDROCODONE BITARTRATE AND ACETAMINOPHEN 5; 325 MG/1; MG/1
1 TABLET ORAL
Status: DISCONTINUED | OUTPATIENT
Start: 2021-04-15 | End: 2021-04-15 | Stop reason: HOSPADM

## 2021-04-15 RX ORDER — PROPOFOL 10 MG/ML
INJECTION, EMULSION INTRAVENOUS PRN
Status: DISCONTINUED | OUTPATIENT
Start: 2021-04-15 | End: 2021-04-15 | Stop reason: SDUPTHER

## 2021-04-15 RX ORDER — BISACODYL 10 MG
10 SUPPOSITORY, RECTAL RECTAL DAILY PRN
Status: DISCONTINUED | OUTPATIENT
Start: 2021-04-15 | End: 2021-04-18 | Stop reason: HOSPADM

## 2021-04-15 RX ORDER — PREGABALIN 75 MG/1
150 CAPSULE ORAL 2 TIMES DAILY
Status: DISCONTINUED | OUTPATIENT
Start: 2021-04-15 | End: 2021-04-18 | Stop reason: HOSPADM

## 2021-04-15 RX ORDER — MAGNESIUM SULFATE 1 G/100ML
INJECTION INTRAVENOUS PRN
Status: DISCONTINUED | OUTPATIENT
Start: 2021-04-15 | End: 2021-04-15 | Stop reason: SDUPTHER

## 2021-04-15 RX ORDER — DIPHENHYDRAMINE HYDROCHLORIDE 50 MG/ML
12.5 INJECTION INTRAMUSCULAR; INTRAVENOUS
Status: DISCONTINUED | OUTPATIENT
Start: 2021-04-15 | End: 2021-04-15 | Stop reason: HOSPADM

## 2021-04-15 RX ORDER — MEPERIDINE HYDROCHLORIDE 50 MG/ML
12.5 INJECTION INTRAMUSCULAR; INTRAVENOUS; SUBCUTANEOUS EVERY 5 MIN PRN
Status: DISCONTINUED | OUTPATIENT
Start: 2021-04-15 | End: 2021-04-15 | Stop reason: HOSPADM

## 2021-04-15 RX ORDER — ONDANSETRON 2 MG/ML
INJECTION INTRAMUSCULAR; INTRAVENOUS
Status: COMPLETED
Start: 2021-04-15 | End: 2021-04-15

## 2021-04-15 RX ORDER — ROCURONIUM BROMIDE 10 MG/ML
INJECTION, SOLUTION INTRAVENOUS PRN
Status: DISCONTINUED | OUTPATIENT
Start: 2021-04-15 | End: 2021-04-15 | Stop reason: SDUPTHER

## 2021-04-15 RX ORDER — DEXAMETHASONE SODIUM PHOSPHATE 4 MG/ML
INJECTION, SOLUTION INTRA-ARTICULAR; INTRALESIONAL; INTRAMUSCULAR; INTRAVENOUS; SOFT TISSUE PRN
Status: DISCONTINUED | OUTPATIENT
Start: 2021-04-15 | End: 2021-04-15 | Stop reason: SDUPTHER

## 2021-04-15 RX ORDER — METHOCARBAMOL 750 MG/1
1500 TABLET, FILM COATED ORAL EVERY 8 HOURS PRN
Status: DISCONTINUED | OUTPATIENT
Start: 2021-04-15 | End: 2021-04-18 | Stop reason: HOSPADM

## 2021-04-15 RX ORDER — LIDOCAINE HYDROCHLORIDE 10 MG/ML
INJECTION, SOLUTION EPIDURAL; INFILTRATION; INTRACAUDAL; PERINEURAL PRN
Status: DISCONTINUED | OUTPATIENT
Start: 2021-04-15 | End: 2021-04-15 | Stop reason: SDUPTHER

## 2021-04-15 RX ORDER — VANCOMYCIN HYDROCHLORIDE 1 G/20ML
INJECTION, POWDER, LYOPHILIZED, FOR SOLUTION INTRAVENOUS PRN
Status: DISCONTINUED | OUTPATIENT
Start: 2021-04-15 | End: 2021-04-15 | Stop reason: ALTCHOICE

## 2021-04-15 RX ORDER — CEFAZOLIN SODIUM 1 G/50ML
INJECTION, SOLUTION INTRAVENOUS PRN
Status: DISCONTINUED | OUTPATIENT
Start: 2021-04-15 | End: 2021-04-15 | Stop reason: SDUPTHER

## 2021-04-15 RX ORDER — METOCLOPRAMIDE HYDROCHLORIDE 5 MG/ML
10 INJECTION INTRAMUSCULAR; INTRAVENOUS
Status: COMPLETED | OUTPATIENT
Start: 2021-04-15 | End: 2021-04-15

## 2021-04-15 RX ORDER — ONDANSETRON 2 MG/ML
4 INJECTION INTRAMUSCULAR; INTRAVENOUS ONCE
Status: COMPLETED | OUTPATIENT
Start: 2021-04-15 | End: 2021-04-15

## 2021-04-15 RX ORDER — SODIUM CHLORIDE 0.9 % (FLUSH) 0.9 %
5-40 SYRINGE (ML) INJECTION PRN
Status: DISCONTINUED | OUTPATIENT
Start: 2021-04-15 | End: 2021-04-18 | Stop reason: HOSPADM

## 2021-04-15 RX ADMIN — DOCUSATE SODIUM 50MG AND SENNOSIDES 8.6MG 1 TABLET: 8.6; 5 TABLET, FILM COATED ORAL at 21:48

## 2021-04-15 RX ADMIN — PROPOFOL 200 MG: 10 INJECTION, EMULSION INTRAVENOUS at 08:18

## 2021-04-15 RX ADMIN — PROMETHAZINE HYDROCHLORIDE 6.25 MG: 25 INJECTION INTRAMUSCULAR; INTRAVENOUS at 14:41

## 2021-04-15 RX ADMIN — HYDROMORPHONE HYDROCHLORIDE 0.25 MG: 1 INJECTION, SOLUTION INTRAMUSCULAR; INTRAVENOUS; SUBCUTANEOUS at 15:52

## 2021-04-15 RX ADMIN — Medication 5 MG: at 09:00

## 2021-04-15 RX ADMIN — HYDROMORPHONE HYDROCHLORIDE 0.5 MG: 1 INJECTION, SOLUTION INTRAMUSCULAR; INTRAVENOUS; SUBCUTANEOUS at 14:33

## 2021-04-15 RX ADMIN — ROCURONIUM BROMIDE 10 MG: 10 INJECTION INTRAVENOUS at 09:24

## 2021-04-15 RX ADMIN — ROCURONIUM BROMIDE 20 MG: 10 INJECTION INTRAVENOUS at 10:26

## 2021-04-15 RX ADMIN — PHENYLEPHRINE HYDROCHLORIDE 100 MCG: 10 INJECTION INTRAVENOUS at 09:58

## 2021-04-15 RX ADMIN — SODIUM CHLORIDE, POTASSIUM CHLORIDE, SODIUM LACTATE AND CALCIUM CHLORIDE: 600; 310; 30; 20 INJECTION, SOLUTION INTRAVENOUS at 13:20

## 2021-04-15 RX ADMIN — SODIUM CHLORIDE, POTASSIUM CHLORIDE, SODIUM LACTATE AND CALCIUM CHLORIDE: 600; 310; 30; 20 INJECTION, SOLUTION INTRAVENOUS at 06:58

## 2021-04-15 RX ADMIN — HYDROMORPHONE HYDROCHLORIDE 0.5 MG: 1 INJECTION, SOLUTION INTRAMUSCULAR; INTRAVENOUS; SUBCUTANEOUS at 13:06

## 2021-04-15 RX ADMIN — PREGABALIN 150 MG: 75 CAPSULE ORAL at 21:48

## 2021-04-15 RX ADMIN — PHENYLEPHRINE HYDROCHLORIDE 100 MCG: 10 INJECTION INTRAVENOUS at 13:10

## 2021-04-15 RX ADMIN — MAGNESIUM SULFATE HEPTAHYDRATE 1000 MG: 1 INJECTION, SOLUTION INTRAVENOUS at 08:46

## 2021-04-15 RX ADMIN — ACETAMINOPHEN 650 MG: 325 TABLET ORAL at 23:20

## 2021-04-15 RX ADMIN — KETAMINE HYDROCHLORIDE 25 MG: 10 INJECTION INTRAMUSCULAR; INTRAVENOUS at 10:42

## 2021-04-15 RX ADMIN — Medication 0.2 MG: at 08:58

## 2021-04-15 RX ADMIN — KETAMINE HYDROCHLORIDE 25 MG: 10 INJECTION INTRAMUSCULAR; INTRAVENOUS at 08:55

## 2021-04-15 RX ADMIN — Medication 5 MG: at 09:33

## 2021-04-15 RX ADMIN — DEXTROSE MONOHYDRATE 2000 MG: 50 INJECTION, SOLUTION INTRAVENOUS at 18:09

## 2021-04-15 RX ADMIN — CEFAZOLIN SODIUM 2 G: 1 INJECTION, SOLUTION INTRAVENOUS at 12:22

## 2021-04-15 RX ADMIN — Medication 0.4 MG: at 13:53

## 2021-04-15 RX ADMIN — FAMOTIDINE 20 MG: 20 TABLET, FILM COATED ORAL at 21:48

## 2021-04-15 RX ADMIN — ROCURONIUM BROMIDE 10 MG: 10 INJECTION INTRAVENOUS at 09:55

## 2021-04-15 RX ADMIN — ACETAMINOPHEN 650 MG: 325 TABLET ORAL at 18:09

## 2021-04-15 RX ADMIN — ONDANSETRON 4 MG: 2 INJECTION INTRAMUSCULAR; INTRAVENOUS at 15:50

## 2021-04-15 RX ADMIN — ROCURONIUM BROMIDE 10 MG: 10 INJECTION INTRAVENOUS at 11:00

## 2021-04-15 RX ADMIN — HYDROMORPHONE HYDROCHLORIDE 1 MG: 1 INJECTION, SOLUTION INTRAMUSCULAR; INTRAVENOUS; SUBCUTANEOUS at 08:18

## 2021-04-15 RX ADMIN — SODIUM CHLORIDE, POTASSIUM CHLORIDE, SODIUM LACTATE AND CALCIUM CHLORIDE: 600; 310; 30; 20 INJECTION, SOLUTION INTRAVENOUS at 09:46

## 2021-04-15 RX ADMIN — ROCURONIUM BROMIDE 10 MG: 10 INJECTION INTRAVENOUS at 12:17

## 2021-04-15 RX ADMIN — METOCLOPRAMIDE 10 MG: 5 INJECTION, SOLUTION INTRAMUSCULAR; INTRAVENOUS at 15:44

## 2021-04-15 RX ADMIN — Medication 5 MG: at 09:24

## 2021-04-15 RX ADMIN — ONDANSETRON 4 MG: 2 INJECTION INTRAMUSCULAR; INTRAVENOUS at 13:25

## 2021-04-15 RX ADMIN — ROCURONIUM BROMIDE 50 MG: 10 INJECTION INTRAVENOUS at 08:18

## 2021-04-15 RX ADMIN — OXYCODONE HYDROCHLORIDE 10 MG: 5 TABLET ORAL at 21:48

## 2021-04-15 RX ADMIN — ROCURONIUM BROMIDE 10 MG: 10 INJECTION INTRAVENOUS at 13:06

## 2021-04-15 RX ADMIN — DEXAMETHASONE SODIUM PHOSPHATE 4 MG: 4 INJECTION, SOLUTION INTRAMUSCULAR; INTRAVENOUS at 08:18

## 2021-04-15 RX ADMIN — Medication 5 MG: at 09:58

## 2021-04-15 RX ADMIN — ROCURONIUM BROMIDE 10 MG: 10 INJECTION INTRAVENOUS at 11:38

## 2021-04-15 RX ADMIN — HYDROMORPHONE HYDROCHLORIDE 0.5 MG: 1 INJECTION, SOLUTION INTRAMUSCULAR; INTRAVENOUS; SUBCUTANEOUS at 14:28

## 2021-04-15 RX ADMIN — HYDROMORPHONE HYDROCHLORIDE 0.2 MG: 1 INJECTION, SOLUTION INTRAMUSCULAR; INTRAVENOUS; SUBCUTANEOUS at 13:36

## 2021-04-15 RX ADMIN — HYDROMORPHONE HYDROCHLORIDE 0.3 MG: 1 INJECTION, SOLUTION INTRAMUSCULAR; INTRAVENOUS; SUBCUTANEOUS at 13:55

## 2021-04-15 RX ADMIN — ROCURONIUM BROMIDE 20 MG: 10 INJECTION INTRAVENOUS at 09:04

## 2021-04-15 RX ADMIN — CEFAZOLIN SODIUM 2 G: 1 INJECTION, SOLUTION INTRAVENOUS at 08:24

## 2021-04-15 RX ADMIN — Medication 0.25 MG: at 15:52

## 2021-04-15 RX ADMIN — LIDOCAINE HYDROCHLORIDE 50 MG: 10 INJECTION, SOLUTION EPIDURAL; INFILTRATION; INTRACAUDAL; PERINEURAL at 08:18

## 2021-04-15 RX ADMIN — SODIUM CHLORIDE: 9 INJECTION, SOLUTION INTRAVENOUS at 21:44

## 2021-04-15 RX ADMIN — Medication 4 MG: at 13:54

## 2021-04-15 ASSESSMENT — PULMONARY FUNCTION TESTS
PIF_VALUE: 24
PIF_VALUE: 22
PIF_VALUE: 22
PIF_VALUE: 23
PIF_VALUE: 1
PIF_VALUE: 23
PIF_VALUE: 22
PIF_VALUE: 23
PIF_VALUE: 23
PIF_VALUE: 22
PIF_VALUE: 22
PIF_VALUE: 23
PIF_VALUE: 23
PIF_VALUE: 22
PIF_VALUE: 4
PIF_VALUE: 22
PIF_VALUE: 23
PIF_VALUE: 22
PIF_VALUE: 23
PIF_VALUE: 23
PIF_VALUE: 22
PIF_VALUE: 23
PIF_VALUE: 22
PIF_VALUE: 22
PIF_VALUE: 23
PIF_VALUE: 20
PIF_VALUE: 24
PIF_VALUE: 22
PIF_VALUE: 23
PIF_VALUE: 22
PIF_VALUE: 23
PIF_VALUE: 15
PIF_VALUE: 22
PIF_VALUE: 23
PIF_VALUE: 22
PIF_VALUE: 23
PIF_VALUE: 24
PIF_VALUE: 23
PIF_VALUE: 23
PIF_VALUE: 22
PIF_VALUE: 22
PIF_VALUE: 23
PIF_VALUE: 22
PIF_VALUE: 23
PIF_VALUE: 24
PIF_VALUE: 22
PIF_VALUE: 23
PIF_VALUE: 22
PIF_VALUE: 22
PIF_VALUE: 3
PIF_VALUE: 23
PIF_VALUE: 22
PIF_VALUE: 23
PIF_VALUE: 20
PIF_VALUE: 23
PIF_VALUE: 22
PIF_VALUE: 23
PIF_VALUE: 22
PIF_VALUE: 22
PIF_VALUE: 23
PIF_VALUE: 6
PIF_VALUE: 23
PIF_VALUE: 22
PIF_VALUE: 22
PIF_VALUE: 23
PIF_VALUE: 22
PIF_VALUE: 23
PIF_VALUE: 22
PIF_VALUE: 27
PIF_VALUE: 22
PIF_VALUE: 23
PIF_VALUE: 22
PIF_VALUE: 23
PIF_VALUE: 22
PIF_VALUE: 22
PIF_VALUE: 25
PIF_VALUE: 1
PIF_VALUE: 23
PIF_VALUE: 23
PIF_VALUE: 22
PIF_VALUE: 22
PIF_VALUE: 23
PIF_VALUE: 22
PIF_VALUE: 23
PIF_VALUE: 22
PIF_VALUE: 23
PIF_VALUE: 5
PIF_VALUE: 1
PIF_VALUE: 23
PIF_VALUE: 24
PIF_VALUE: 1
PIF_VALUE: 22
PIF_VALUE: 23
PIF_VALUE: 22
PIF_VALUE: 23
PIF_VALUE: 1
PIF_VALUE: 24
PIF_VALUE: 23
PIF_VALUE: 22
PIF_VALUE: 23
PIF_VALUE: 22
PIF_VALUE: 23
PIF_VALUE: 22
PIF_VALUE: 21
PIF_VALUE: 23
PIF_VALUE: 22
PIF_VALUE: 22
PIF_VALUE: 23
PIF_VALUE: 22
PIF_VALUE: 23
PIF_VALUE: 24
PIF_VALUE: 22
PIF_VALUE: 4
PIF_VALUE: 23
PIF_VALUE: 24
PIF_VALUE: 21
PIF_VALUE: 22
PIF_VALUE: 22
PIF_VALUE: 1
PIF_VALUE: 22
PIF_VALUE: 9
PIF_VALUE: 20
PIF_VALUE: 22
PIF_VALUE: 3
PIF_VALUE: 7
PIF_VALUE: 23
PIF_VALUE: 21
PIF_VALUE: 22
PIF_VALUE: 23
PIF_VALUE: 22
PIF_VALUE: 23

## 2021-04-15 ASSESSMENT — PAIN SCALES - GENERAL
PAINLEVEL_OUTOF10: 6
PAINLEVEL_OUTOF10: 9
PAINLEVEL_OUTOF10: 3
PAINLEVEL_OUTOF10: 3

## 2021-04-15 ASSESSMENT — PAIN DESCRIPTION - DESCRIPTORS
DESCRIPTORS: ACHING;THROBBING
DESCRIPTORS: ACHING
DESCRIPTORS: ACHING

## 2021-04-15 ASSESSMENT — PAIN DESCRIPTION - PAIN TYPE
TYPE: SURGICAL PAIN

## 2021-04-15 ASSESSMENT — PAIN DESCRIPTION - LOCATION
LOCATION: BACK

## 2021-04-15 ASSESSMENT — PAIN DESCRIPTION - PROGRESSION: CLINICAL_PROGRESSION: GRADUALLY WORSENING

## 2021-04-15 ASSESSMENT — PAIN DESCRIPTION - FREQUENCY
FREQUENCY: CONTINUOUS
FREQUENCY: CONTINUOUS

## 2021-04-15 ASSESSMENT — PAIN DESCRIPTION - ORIENTATION: ORIENTATION: LOWER

## 2021-04-15 NOTE — CARE COORDINATION
Case Management Initial Discharge Plan  Su Burris,             Met with: pt and spouse to discuss discharge plans. Information verified: address, contacts, phone number, , insurance Yes    Emergency Contact/Next of Kin name & number: Pretty Salmeron spouse [phone]    PCP: PAUL Monte CNP  Date of last visit: 6 months ago    Insurance Provider: [employer]    Discharge Planning    Living Arrangements:  Spouse/Significant Other   Support Systems:  Family Members, Spouse/Significant Other    Home has 1 stories  2 stairs to climb to get into front door, 0 stairs to climb to reach second floor  Location of bedroom/bathroom in home Main    Patient able to perform ADL's:Independent    Current Services (outpatient & in home) None  DME equipment: None  DME provider: N/A    Receiving oral anticoagulation therapy? No    If indicated:   Physician managing anticoagulation treatment: N/A  Where does patient obtain lab work for ATC treatment? N/A      Potential Assistance Needed:  Outpatient PT/OT    Patient agreeable to home care: Yes  Freedom of choice provided:  yes    Prior SNF/Rehab Placement and Facility: None  Agreeable to SNF/Rehab: Yes  [place] of choice provided: yes     Evaluation: no    Expected Discharge date:  21    Patient expects to be discharged to:  Home  Follow Up Appointment: Best Day/ Time:      Transportation provider: Family  Transportation arrangements needed for discharge: No    Readmission Risk              Risk of Unplanned Readmission:        5             Does patient have a readmission risk score greater than 14?: No  If yes, follow-up appointment must be made within 7 days of discharge.      Goals of Care: Safety      Discharge Plan: Home with HC/ PT/OT as OP pending PT/OT eval          Electronically signed by Bud Rojas RN on 4/15/21 at [address] [facility]

## 2021-04-15 NOTE — PROGRESS NOTES
Patient and his wife given update that Dr. Greg Ye notified OR that his procedure would be delayed until 8AM.  Patient and his wife verbalized understanding

## 2021-04-15 NOTE — PROGRESS NOTES
Neurosurgery Post op Progress Note      POD# 0    s/p posterior lumbar fusion L3-5    SUBJECTIVE:      Patient presents with lumbar spondylosis with radiculopathy. Evaluated while in recovery. Alert, following commands. Pain well controlled. Complaining of nausea. Just medicated for nausea. Unable to state if paresthesias and pain to legs improved.         OBJECTIVE      Physical exam   VITALS:    Vitals:    04/15/21 1445   BP:    Pulse:    Resp:    Temp:    SpO2: 96%     INTAKE:      Intake/Output Summary (Last 24 hours) at 4/15/2021 1447  Last data filed at 4/15/2021 1405  Gross per 24 hour   Intake 2100 ml   Output 490 ml   Net 1610 ml            Neurological exam   alert, oriented x3, affect appropriate, no focal neurological deficits and moves all extremities well      Wound   Post op wound:    Dressing is clean/dry/intact with no signs of drainage     ASSESSMENT AND PLAN    63 y.o. male status postposterior lumbar fusion L3-5  post op day # 0    - Analgesia: tylenol, roxicodone PRN, dilaudid PRN   - Periop Antibiotics: Ancef 1g q8hrs for 3 doses   - Activity: As tolerated, PT and OT eval in AM  - DVT prophylaxis: SCDs, start lovenox in AM  - Diet: Advance as tolerated  - GI prophylaxis: Pepcid 20 mg BID  - encourage IS  - f/u post op upright lumbar XR    Electronically signed by PAUL Torres CNP on 4/15/2021 at 2:47 PM

## 2021-04-15 NOTE — ANESTHESIA PRE PROCEDURE
Department of Anesthesiology  Preprocedure Note       Name:  Jj Colón   Age:  63 y.o.  :  1960                                          MRN:  [id number]         Date:  4/15/2021      Surgeon: Silvia Arevalo):  Clay Gooden DO    Procedure: Procedure(s):  L3-4, L4-5  POSTERIOR LUMBAR FUSION  (MEDTRONICS, ADITI TABLE, PRONE, C-ARM, O-ARM)    Medications prior to admission:   Prior to Admission medications    Medication Sig Start Date End Date Taking? Authorizing Provider   HYDROcodone-acetaminophen (NORCO) 5-325 MG per tablet Take 1 tablet by mouth every 6 hours as needed for Pain for up to 30 days. 21 Yes PAUL Manrique CNP   pregabalin (LYRICA) 150 MG capsule Take 1 capsule by mouth 2 times daily for 30 days. 3/9/21 4/15/21 Yes PAUL Manrique CNP   ibuprofen (ADVIL;MOTRIN) 600 MG tablet Take 1 tablet by mouth every 6 hours as needed for Pain 20   PAUL Manrique CNP   Handicap Placard [facility] [place] It is my medical opinion that Beth Gillette requires a disability parking placard for the following reasons:  He has limited walking ability due to an arthritic condition. Duration of need: permanent 20   PAUL Manrique CNP   ondansetron (ZOFRAN) 4 MG tablet TAKE ONE TABLET BY MOUTH EVERY 8 HOURS AS NEEDED FOR NAUSEA 20   PAUL Manrique CNP       Current medications:    Current Facility-Administered Medications   Medication Dose Route Frequency Provider Last Rate Last Admin    lactated ringers infusion 1,000 mL  1,000 mL Intravenous Continuous Seth Mehta MD   New Bag at 04/15/21 9352       Allergies:     Allergies   Allergen Reactions    Morphine Other (See Comments)     Patient developed suicidal ideations    Fentanyl Hives       Problem List:    Patient Active Problem List   Diagnosis Code    Chronic back pain M54.9, G89.29       Past Medical History:        Diagnosis Date    Chronic back pain     COVID-19 2021    COVID-19 04/12/2021    Pt tested positive for covid when he went to scheduled appt before surgery on 4/3/2021 @ , no symptoms    Headache(784.0)     Kidney stones     Osteoarthritis     Snores     denies apnea    Wellness examination 03/23/2021    PCP: Joyce Arvizu, last visit July 2020    Wellness examination 03/23/2021    Neurosurgeon: [facility], last visit March 2021    Wellness examination 03/23/2021    Urology: Nahid, jean paul unsure of doctor, last visit yr ago       Past Surgical History:        Procedure Laterality Date    ANESTHESIA NERVE BLOCK N/A 08/14/2020    NERVE BLOCK - MBB # 1 L3-4, L4-5 performed by Tristen Harding MD at [address] [place] [address] 3    COLONOSCOPY  2012    LITHOTRIPSY  08/2018    LUMBAR LAMINECTOMY      laminectomy X2 and Spinal Cord stimulartor implant    NERVE BLOCK Bilateral 08/21/2020    NERVE BLOCK - MBB #2 L3-4 L4-5 performed by Tristen Harding MD at [address] [place] [address] Right 09/04/2020    NERVE RADIOFREQUENCY ABLATION - L3-4, L4-5 performed by Tristen Harding MD at [address] [place] [address] Left 09/11/2020    NERVE RADIOFREQUENCY ABLATION L3-4, L4-5 performed by Tristen Harding MD at [address] [facility]  01/30/2020    removal   [facility] N/A 01/30/2020    SPINAL CORD STIMULATOR IMPLANT REMOVAL, performed by Marleny Wood DO at [address] [place] [address]         Social History:    Social History     Tobacco Use    Smoking status: Never Smoker    Smokeless tobacco: Never Used   Substance Use Topics    Alcohol use: Yes     Comment: rarely, 2 a month                                Counseling given: Not Answered      Vital Signs (Current):   Vitals:    04/15/21 0629   BP: 135/83   Pulse: 62   Resp: 16   Temp: 97 °F (36.1 °C)   TempSrc: Temporal   SpO2: 98%   Weight: 192 lb 10.9 oz (87.4 kg)   Height: 5' 8\" (1.727 m)                                              BP Readings from Last 3 Encounters:   04/15/21 135/83   04/13/21 (!) 147/78   03/23/21 (!) 142/79       NPO Status: Time of last liquid consumption: 1800                        Time of last solid consumption: 1800                        Date of last liquid consumption: 04/14/21                        Date of last solid food consumption: 04/14/21    BMI:   Wt Readings from Last 3 Encounters:   04/15/21 192 lb 10.9 oz (87.4 kg)   04/13/21 190 lb (86.2 kg)   03/23/21 192 lb (87.1 kg)     Body mass index is 29.3 kg/m². CBC:   Lab Results   Component Value Date    WBC 5.3 03/23/2021    RBC 5.00 03/23/2021    RBC 5.01 01/02/2012    HGB 15.7 03/23/2021    HCT 47.0 03/23/2021    MCV 94.0 03/23/2021    RDW 12.1 03/23/2021     03/23/2021     01/02/2012       CMP:   Lab Results   Component Value Date     03/23/2021    K 4.1 03/23/2021     03/23/2021    CO2 25 03/23/2021    BUN 13 03/23/2021    CREATININE 0.75 03/23/2021    GFRAA >60 03/23/2021    LABGLOM >60 03/23/2021    GLUCOSE 189 03/23/2021    PROT 6.9 03/02/2019    CALCIUM 9.1 03/02/2019    BILITOT 0.66 03/02/2019    ALKPHOS 64 03/02/2019    AST 17 03/02/2019    ALT 23 03/02/2019       POC Tests: No results for input(s): POCGLU, POCNA, POCK, POCCL, POCBUN, POCHEMO, POCHCT in the last 72 hours.     Coags: No results found for: PROTIME, INR, APTT    HCG (If Applicable): No results found for: PREGTESTUR, PREGSERUM, HCG, HCGQUANT     ABGs: No results found for: PHART, PO2ART, OUN1UCT, RTU7CUZ, BEART, U0GPOYTM     Type & Screen (If Applicable):  No results found for: LABABO, LABRH    Drug/Infectious Status (If Applicable):  No results found for: HIV, HEPCAB    COVID-19 Screening (If Applicable):   Lab Results   Component Value Date    COVID19 DETECTED 04/02/2021    COVID19 Not Detected 09/08/2020           Anesthesia Evaluation  Patient summary reviewed and Nursing notes reviewed no history of anesthetic complications:   Airway: Mallampati: IV  TM distance: >3 FB   Neck ROM: full  Mouth opening: > = 3 FB Dental: normal exam         Pulmonary:Negative Pulmonary ROS and normal exam                               Cardiovascular:Negative CV ROS                      Neuro/Psych:                ROS comment: Chronic pain GI/Hepatic/Renal: Neg GI/Hepatic/Renal ROS            Endo/Other: Negative Endo/Other ROS                    Abdominal:           Vascular:                                      Anesthesia Plan      general     ASA 2       Induction: intravenous. MIPS: Postoperative opioids intended and Prophylactic antiemetics administered. Anesthetic plan and risks discussed with patient. Plan discussed with CRNA.                   Parish Kyle MD   4/15/2021

## 2021-04-16 ENCOUNTER — APPOINTMENT (OUTPATIENT)
Dept: GENERAL RADIOLOGY | Age: 61
DRG: 454 | End: 2021-04-16
Attending: NEUROLOGICAL SURGERY
Payer: COMMERCIAL

## 2021-04-16 LAB
HCT VFR BLD CALC: 40.2 % (ref 40.7–50.3)
HEMOGLOBIN: 13.3 G/DL (ref 13–17)
MCH RBC QN AUTO: 31.6 PG (ref 25.2–33.5)
MCHC RBC AUTO-ENTMCNC: 33.1 G/DL (ref 28.4–34.8)
MCV RBC AUTO: 95.5 FL (ref 82.6–102.9)
NRBC AUTOMATED: 0 PER 100 WBC
PDW BLD-RTO: 12.6 % (ref 11.8–14.4)
PLATELET # BLD: 195 K/UL (ref 138–453)
PMV BLD AUTO: 9.9 FL (ref 8.1–13.5)
RBC # BLD: 4.21 M/UL (ref 4.21–5.77)
WBC # BLD: 14.6 K/UL (ref 3.5–11.3)

## 2021-04-16 PROCEDURE — 1200000000 HC SEMI PRIVATE

## 2021-04-16 PROCEDURE — 36415 COLL VENOUS BLD VENIPUNCTURE: CPT

## 2021-04-16 PROCEDURE — 97535 SELF CARE MNGMENT TRAINING: CPT

## 2021-04-16 PROCEDURE — 2580000003 HC RX 258: Performed by: REGISTERED NURSE

## 2021-04-16 PROCEDURE — 51798 US URINE CAPACITY MEASURE: CPT

## 2021-04-16 PROCEDURE — 97162 PT EVAL MOD COMPLEX 30 MIN: CPT

## 2021-04-16 PROCEDURE — 97166 OT EVAL MOD COMPLEX 45 MIN: CPT

## 2021-04-16 PROCEDURE — 72100 X-RAY EXAM L-S SPINE 2/3 VWS: CPT

## 2021-04-16 PROCEDURE — 6370000000 HC RX 637 (ALT 250 FOR IP): Performed by: REGISTERED NURSE

## 2021-04-16 PROCEDURE — 6360000002 HC RX W HCPCS: Performed by: REGISTERED NURSE

## 2021-04-16 PROCEDURE — 97110 THERAPEUTIC EXERCISES: CPT

## 2021-04-16 PROCEDURE — APPSS30 APP SPLIT SHARED TIME 16-30 MINUTES: Performed by: REGISTERED NURSE

## 2021-04-16 PROCEDURE — 85027 COMPLETE CBC AUTOMATED: CPT

## 2021-04-16 RX ADMIN — OXYCODONE HYDROCHLORIDE 10 MG: 5 TABLET ORAL at 18:47

## 2021-04-16 RX ADMIN — PREGABALIN 150 MG: 75 CAPSULE ORAL at 20:30

## 2021-04-16 RX ADMIN — PREGABALIN 150 MG: 75 CAPSULE ORAL at 08:21

## 2021-04-16 RX ADMIN — METHOCARBAMOL TABLETS 1500 MG: 750 TABLET, COATED ORAL at 17:10

## 2021-04-16 RX ADMIN — DEXTROSE MONOHYDRATE 2000 MG: 50 INJECTION, SOLUTION INTRAVENOUS at 04:21

## 2021-04-16 RX ADMIN — DOCUSATE SODIUM 50MG AND SENNOSIDES 8.6MG 1 TABLET: 8.6; 5 TABLET, FILM COATED ORAL at 08:21

## 2021-04-16 RX ADMIN — ACETAMINOPHEN 650 MG: 325 TABLET ORAL at 04:21

## 2021-04-16 RX ADMIN — ACETAMINOPHEN 650 MG: 325 TABLET ORAL at 17:09

## 2021-04-16 RX ADMIN — FAMOTIDINE 20 MG: 20 TABLET, FILM COATED ORAL at 20:29

## 2021-04-16 RX ADMIN — DEXTROSE MONOHYDRATE 2000 MG: 50 INJECTION, SOLUTION INTRAVENOUS at 11:50

## 2021-04-16 RX ADMIN — ACETAMINOPHEN 650 MG: 325 TABLET ORAL at 11:17

## 2021-04-16 RX ADMIN — DOCUSATE SODIUM 50MG AND SENNOSIDES 8.6MG 1 TABLET: 8.6; 5 TABLET, FILM COATED ORAL at 20:29

## 2021-04-16 RX ADMIN — FAMOTIDINE 20 MG: 20 TABLET, FILM COATED ORAL at 08:21

## 2021-04-16 RX ADMIN — METHOCARBAMOL TABLETS 1500 MG: 750 TABLET, COATED ORAL at 09:20

## 2021-04-16 RX ADMIN — OXYCODONE HYDROCHLORIDE 5 MG: 5 TABLET ORAL at 08:18

## 2021-04-16 RX ADMIN — ENOXAPARIN SODIUM 40 MG: 40 INJECTION, SOLUTION INTRAVENOUS; SUBCUTANEOUS at 08:21

## 2021-04-16 RX ADMIN — SODIUM CHLORIDE, PRESERVATIVE FREE 10 ML: 5 INJECTION INTRAVENOUS at 20:42

## 2021-04-16 RX ADMIN — SODIUM CHLORIDE, PRESERVATIVE FREE 10 ML: 5 INJECTION INTRAVENOUS at 11:27

## 2021-04-16 ASSESSMENT — PAIN DESCRIPTION - PAIN TYPE: TYPE: SURGICAL PAIN

## 2021-04-16 ASSESSMENT — PAIN DESCRIPTION - DESCRIPTORS
DESCRIPTORS: ACHING
DESCRIPTORS: SORE;ACHING

## 2021-04-16 ASSESSMENT — PAIN DESCRIPTION - LOCATION
LOCATION: BACK
LOCATION: NECK
LOCATION: BACK;NECK

## 2021-04-16 ASSESSMENT — PAIN SCALES - GENERAL
PAINLEVEL_OUTOF10: 9
PAINLEVEL_OUTOF10: 6
PAINLEVEL_OUTOF10: 9
PAINLEVEL_OUTOF10: 6

## 2021-04-16 ASSESSMENT — PAIN DESCRIPTION - ORIENTATION
ORIENTATION: LOWER
ORIENTATION: LOWER

## 2021-04-16 NOTE — CARE COORDINATION
Physical therapy is recommending a rolling walker for patient upon discharge. Notified Neurosurgery of need. Awaiting script and face to face.

## 2021-04-16 NOTE — PLAN OF CARE
Herminia Early was evaluated today and a DME order was entered for a wheeled walker because he requires this to successfully complete daily living tasks of ambulating. A wheeled walker is necessary due to the patient's unsteady gait, upper body weakness, and inability to  an ambulation device; and he can ambulate only by pushing a walker instead of a lesser assistive device such as a cane, crutch, or standard walker. The need for this equipment was discussed with the patient and he understands and is in agreement.

## 2021-04-16 NOTE — OP NOTE
Operative Note      Patient: Sadi Posey  YOB: 1960  MRN: [id number]    Date of Procedure: 4/15/2021    Pre-Op Diagnosis: lumbar spondylosis with neurogenic claudication, lumbar instability    Post-Op Diagnosis: Same       Procedure  Nonsegmental fixation with pedicle screws and rods at L3, L4, L5  Posterior lateral arthrodesis at L3, L4, L5  L4-5 anterior discectomy and anterior arthrodesis  Implantation of titanium Medtronic interbody cage at L4-5  Laminectomy and Reddy facetectomy at L4-5   Laminectomy and medial facetectomy L3-4   Lysis of adhesions and scar along with L5 neurolysis  Use of morselized autograft via same incision  Use of morselized allograft  Use of spinal neuro navigation  Use of fluoroscopy  Use of operative microscope  22 modifier    Surgeon(s):  Chai Quick DO    Assistant:    Assistant: Jeffory HonerClaudene Fermo    Anesthesia: General    Estimated Blood Loss (mL): 125     Complications: None    Specimens:   * No specimens in log *    Implants:  Implant Name Type Inv. Item Serial No.  Lot No. LRB No. Used Action   GRAFT BNE VIABLE 1 CC ROHIT MTRX FIBERCEL - [id number]  GRAFT BNE VIABLE 1 CC ROHIT MTRX FIBERCEL [address] INC-WD [id number] N/A 1 Implanted   GRAFT DURA [id number] CLLGN SUTURELESS HIGHLY CNFRM RESTR  GRAFT DURA [id number] CLLGN SUTURELESS HIGHLY CNFRM RESTR  COLLAGEN [employer] INC-WD [id number] N/A 1 Implanted   GRAFT BNE SUB [id number] POSTEROLATERAL CERV DEMIN BNE MTRX - [id number]  GRAFT BNE SUB [id number] POSTEROLATERAL CERV DEMIN BNE MTRX [id number] MEDTRONIC SPINALGRAFT TECH-WD  N/A 1 Implanted   SPACER [id number] 12W 35MM X 13MM 20 DG TI  SPACER [id number] 12W 35MM X 13MM 20 DG TI  MEDTRONIC SOFAMOR DANEK-WD [id number] N/A 1 Implanted   SCREW SPNL L50MM DIA7. 5MM THORACOLUMBOSACRAL CO CHROM  SCREW SPNL L50MM DIA7. 5MM THORACOLUMBOSACRAL CO CHROM  MEDTRONIC SOFAMOR DANEK-WD  N/A 4 Implanted   SCREW SPNL CO CHROM TI MULTIAXIAL FOR 4.75MM DION L45MM  SCREW SPNL CO CHROM TI MULTIAXIAL FOR 4.75MM DION L45MM  MEDTRONIC SOFAMOR DANEK-WD  N/A 1 Implanted   SCREW SPNL L40MM OD8.5MM TI MULT AX [id number] SOLERA  SCREW SPNL L40MM OD8.5MM TI MULT AX [id number] SOLERA  MEDTRONIC SOFAMOR DANEK-WD  N/A 1 Implanted   DION SPNL L70MM DIA4. 75MM CO CHROM PREBENT [facility] SOLERA  DION SPNL L70MM DIA4. 75MM CO CHROM PREBENT [address] SOLERA  MEDTRONIC SOFAMOR DANEK-WD  N/A 2 Implanted   SET SCR SPNL DIA4. 75MM TI BRK OFF [address] SOLERA  SET SCR SPNL DIA4. 75MM TI BRK OFF [address] SOLERA  MEDTRONIC SOFAMOR DANEK-WD  N/A 6 Implanted         Drains:   Closed/Suction Drain Inferior Back Bulb  (Active)   Site Description Unable to view 04/16/21 0400   Dressing Status Clean;Dry; Intact 04/16/21 1202   Drainage Appearance Bloody 04/16/21 1202   Status To bulb suction 04/16/21 1202   Output (ml) 90 ml 04/16/21 1202       [REMOVED] Urethral Catheter Double-lumen 16 fr (Removed)       Findings: epidural scarring, stenosis, L3/4 instability    Detailed Description of Procedure:   Patient was consented preoperatively and brought into the operating room. He was placed under general anesthesia flipped prone onto the [facility] table all pressure points padded. Arms were placed on the arm boards. Lumbar region was sterilely cleansed and prepped. Midline was marked. After sterile prepping draping a timeout was performed fluoroscopy was used to identify the L3 level. Incision was appropriately marked infiltrated with 1% lidocaine with epinephrine. 10 blade is used to perform incision followed by Suh and Bovie to perform a subperiosteal dissection all the way to the transverse processes preserving the L2-3 facet. Soft and retractors were advanced. Reference frame was attached to the L5 spinous process and O-arm spin was completed. Using neuro navigation I placed pedicle screws bilaterally at L3-L4 and L5 using a combination of navigated Midas followed by navigated tap followed by ball-tipped probe followed by placement of screws. Second O-arm spin confirmed all screws were in good position. At this point I performed a left-sided laminotomy at L4-5 and complete Reddy facetectomy ensuring that there was decompression all the way to the other side undercutting the lamina and contralateral facet. This was performed both at L3-4 and L4-5. Complete medial facetectomy L4-5 was performed. A significant mount of scarring over the L5 shoulder was noted. Great difficulty was encountered utilizing a combination of upgoing curette and Penfield 4 to gently dissect the L5 shoulder away from the epidural scarring. A small amount of CSF egress was noted underneath the scar. This was covered with a small DuraMatrix. Significant mount of epidural scarring as well as scarring over the bone requiring additional 35 to 40 minutes of dissection time as well as approximately 2 hours of laminectomy time at this location. After complete elastic in the left side at L4-5 as well as a L3-4 laminectomy decision was made to interbody L4-5 to allow for reduction of the collapsed disc at this level. Pedicle based distractors were placed in the L4-5 pedicles and distraction was performed. 15 blade was used to perform an annulotomy. Nerve retractor was used to retract the thecal sac medially by the assistant. Sequential leonard along with a straight curette was used to perform a complete discectomy at the L4-5 level as well as arthrodesis anteriorly. Trial was placed noted be appropriate. 13 mm height titanium mini cage was filled with morselized autograft as well as cellular graft and placed under fluoroscopic guidance at L4-5 anteriorly. Decision was made to avoid interbody grafting at L3-4 considering that the disc was still fairly large and there was good lordosis evident. At this point rods were placed bilaterally and affixed with caps with compression slightly over each level. Wound was thoroughly irrigated with Betadine as well as saline.   Vancomycin was placed directly over the hardware. Posterior lateral arthrodesis of the left hemilamina and facets was performed from L3-5 on the right side. Remaining morselized autograft along the back of bones was placed here. 7 flat HIRAL drain was tunneled subfascial and secured with drain stitch. I did bring the operative microscope in order to evaluate the small area of durotomy as well as the scarring to allow for improved dissection. Wound was closed in multiple layers using 0 Vicryl for the fascia followed by inverted 2-0 Vicryl for subcutaneous tissue and staples for skin. Bacitracin island were placed and a drain stitch was placed.   Patient was then flipped back supine onto the regular bed extubated in stable condition returned to the PACU    Electronically signed by Marleny Wood DO on 4/16/2021 at 4:08 PM

## 2021-04-16 NOTE — PROGRESS NOTES
Physical Therapy    Facility/Department: [facility] NEURO  Initial Assessment    NAME: Alexander Mejia  : 1960  MRN: [id number]    Date of Service: 2021    Discharge Recommendations:  Patient would benefit from continued therapy after discharge   PT Equipment Recommendations  Equipment Needed: Yes  Mobility Devices: Redell Furlong: Rolling      Assessment   Body structures, Functions, Activity limitations: Decreased functional mobility ; Decreased endurance  Assessment: Pt limited by pain and decreased endurance. ABle to ambulate safely with rw, recommend for home. Prognosis: Good  Decision Making: Medium Complexity  PT Education: Goals;PT Role;Equipment;Home Exercise Program;Gait Training  Patient Education: Pt educated on gentle neck AROM and retraction to decrease neck stiffness, able to demonstrate well  REQUIRES PT FOLLOW UP: Yes  Activity Tolerance  Activity Tolerance: Patient Tolerated treatment well          has a past medical history of Chronic back pain, COVID-19, COVID-19, Headache(784.0), Kidney stones, Osteoarthritis, Snores, Wellness examination, Wellness examination, and Wellness examination. has a past surgical history that includes Colonoscopy (); lumbar laminectomy; Ureter stent placement; Lithotripsy (2018); Spinal Cord Stimulator Surgery (2020); Spinal Cord Stimulator Surgery (N/A, 2020); Anesthesia Nerve Block (N/A, 2020); Nerve Block (Bilateral, 2020); Pain management procedure (Right, 2020); Pain management procedure (Left, 2020); back surgery; and lumbar fusion (04/15/2021). Restrictions  Restrictions/Precautions  Restrictions/Precautions: Up as Tolerated  Required Braces or Orthoses?: No  Position Activity Restriction  Spinal Precautions: No Bending, No Lifting, No Twisting  Spinal Precautions: Lumbar fusion, L3-4.  L4-5  Vision/Hearing  Vision: Within Functional Limits  Hearing: Within functional limits     Subjective  General  Chart Reviewed: Yes  Family / Caregiver Present: No  Diagnosis: Lumbar fusion  Follows Commands: Within Functional Limits  Pain Screening  Patient Currently in Pain: Yes  Pain Assessment  Pain Assessment: 0-10  Pain Level: 9  Pain Type: Acute pain;Surgical pain  Pain Location: Back;Neck  Vital Signs  Patient Currently in Pain: Yes       Orientation  Orientation  Overall Orientation Status: Within Functional Limits  Social/Functional History  Social/Functional History  Lives With: Spouse  Type of Home: House  Home Layout: One level  Home Access: Stairs to enter without rails  Entrance Stairs - Number of Steps: 1  Bathroom Shower/Tub: Walk-in shower  ADL Assistance: Independent  Homemaking Assistance: Independent  Homemaking Responsibilities: Yes(shared with spouse)  Ambulation Assistance: Independent  Transfer Assistance: Independent  Active : Yes  Mode of Transportation: Car  Occupation: Full time employment  Type of occupation: Works at [employer]  Cognition   Cognition  Overall Cognitive Status: WFL    Objective          AROM RLE (degrees)  RLE AROM: WFL  AROM LLE (degrees)  LLE AROM : WFL  AROM RUE (degrees)  RUE AROM : WFL  AROM LUE (degrees)  LUE AROM : WFL  Strength RLE  Strength RLE: WFL  Strength LLE  Strength LLE: WFL  Strength RUE  Strength RUE: WFL  Strength LUE  Strength LUE: WFL     Sensation  Overall Sensation Status: Impaired(tingling in legs on and off)  Bed mobility  Rolling to Left: Stand by assistance  Rolling to Right: Stand by assistance  Supine to Sit: Stand by assistance  Scooting: Stand by assistance  Comment: use of rails to assist, educated on log rolling for home  Transfers  Sit to Stand: Contact guard assistance  Stand to sit: Contact guard assistance  Bed to Chair: Contact guard assistance  Comment: assist with rw and lines  Ambulation  Ambulation?: Yes  More Ambulation?: No  Ambulation 1  Surface: level tile  Device: Rolling Walker  Assistance: Contact guard assistance  Quality of Gait: slow gait, increasing step length with distance  Gait Deviations: Slow Sherley  Distance: 80 ft  Comments: pt educated on benefits of using rw for short time due to instability and pain limiting mvmt  Stairs/Curb  Stairs?: No     Balance  Posture: Good  Sitting - Static: Good  Sitting - Dynamic: Good  Standing - Static: Good  Standing - Dynamic: Fair;+  Comments: standing with B UE support        Plan   Plan  Times per week: 6-7 days/week  Current Treatment Recommendations: Strengthening, Transfer Training, Balance Training, Gait Training, Stair training, Functional Mobility Training, Patient/Caregiver Education & Training  Safety Devices  Type of devices: Call light within reach, Left in chair  Restraints  Initially in place: No             AM-PAC Score  AM-PAC Inpatient Mobility Raw Score : 19 (04/16/21 1032)  AM-PAC Inpatient T-Scale Score : 45.44 (04/16/21 1032)  Mobility Inpatient CMS 0-100% Score: 41.77 (04/16/21 1032)  Mobility Inpatient CMS G-Code Modifier : CK (04/16/21 1032)          Goals  Long term goals  Time Frame for Long term goals : 6 visits  Long term goal 1: Pt indep with log rolling for bed mobility  Long term goal 2: Pt mod indep with transfers  Long term goal 3: Pt ambulate 150 ft with rw and supervision  Long term goal 4: Pt negotiate 1 step up to prepare for entering home, Celine  Patient Goals   Patient goals :  To return home       Therapy Time   Individual Concurrent Group Co-treatment   Time In 0910         Time Out 0940         Minutes 30         Timed Code Treatment Minutes: [facility]

## 2021-04-16 NOTE — PLAN OF CARE
Josephine Alvarado was evaluated today and a DME order was entered for a wheeled walker because he requires this to successfully complete daily living tasks of personal cares and ambulating. A wheeled walker is necessary due to the patient's unsteady gait, upper body weakness, and inability to  an ambulation device; and he can ambulate only by pushing a walker instead of a lesser assistive device such as a cane, crutch, or standard walker. The need for this equipment was discussed with the patient and he understands and is in agreement.

## 2021-04-16 NOTE — PROGRESS NOTES
Neurosurgery MARLA/Resident    Daily Progress Note   CC:No chief complaint on file. 4/16/2021  10:43 AM    Chart reviewed. No acute events overnight. No new complaints. Doing well this morning reporting postop surgical pain. Urinating without difficulties. Tolerating clear liquid diet which has been advanced to general diet. Afebrile. Drain output 230 mL / 12 hours. Denies numbness tingling in arms or legs, denies pain going down bilateral legs. Denies passing flatus or bowel movement at this time. Vitals:    04/15/21 2117 04/15/21 2331 04/16/21 0421 04/16/21 0836   BP: (!) 141/67 130/61 125/68 (!) 140/68   Pulse: 79 77 69 84   Resp: 18 19 17 23   Temp: 98.3 °F (36.8 °C) 98.6 °F (37 °C) 98 °F (36.7 °C) 97.7 °F (36.5 °C)   TempSrc: Oral Oral Oral Oral   SpO2: 98% 99% 99%    Weight:       Height:           PE:   AOx3   PERRL, EOMI    Motor   L deltoid 5/5; R deltoid 5/5  L biceps 5/5; R biceps 5/5  L triceps 5/5; R triceps 5/5  L wrist extension 5/5; R wrist extension 5/5  L intrinsics 5/5; R intrinsics 5/5      L iliopsoas 5/5 , R iliopsoas 5/5  L quadriceps 5/5; R quadriceps 5/5  L Dorsiflexion 5/5; R dorsiflexion 5/5  L Plantarflexion 5/5; R plantarflexion 5/5  L EHL 5/5; R EHL 5/5    Sensation: intact     Drain output: 230ml/12 hours  Incision intact closed with staples well approximated no drainage noted dressing changed this morning.       Lab Results   Component Value Date    WBC 14.6 (H) 04/16/2021    HGB 13.3 04/16/2021    HCT 40.2 (L) 04/16/2021     04/16/2021    CHOL 163 03/02/2019    TRIG 119 03/02/2019    HDL 36 (L) 03/02/2019    ALT 23 03/02/2019    AST 17 03/02/2019     03/23/2021    K 4.1 03/23/2021     03/23/2021    CREATININE 0.75 03/23/2021    BUN 13 03/23/2021    CO2 25 03/23/2021    PSA 0.54 03/02/2019    LABA1C 6.3 10/18/2019       Radiology   Xr Lumbar Spine (2-3 Views)    Result Date: 4/16/2021  EXAMINATION: THREE XRAY VIEWS OF THE LUMBAR SPINE 4/16/2021 11:32 am COMPARISON: May 15, 2020 HISTORY: ORDERING SYSTEM PROVIDED HISTORY: Low back pain, lumbar radiculopathy. TECHNOLOGIST PROVIDED HISTORY: f/u post op please do standing upright FINDINGS: Status post L3-5 posterior spinal fusion. Disc spacer noted at L4-5. Surgical drain remains in place. No evidence of hardware complication. Vertebral body heights maintained. Mild disc height loss at L1-2 and L3-4. Hypertrophic facet changes most pronounced at L4-5 and L5-S1. Lumbar lordosis within normal limits. Mild dextroconvex curvature, unchanged. L3-5 posterior spinal fusion in satisfactory alignment. No evidence of hardware complication. A/P  63 y.o. male who presents with lumbar spondylosis with radiculopathy      POD #1 s/p lumbar fusion L3-5    Advanced to general diet  On Lovenox for DVT prophylaxis  Roxicodone for pain management  Robaxin for muscle spasms  PT and OT for mobilization  Encourage use of incentive spirometer  Maintain HIRAL record output  Check post void        Please contact neurosurgery with any changes in patients neurologic status.        Syed Cabezas, CATHI  4/16/21  10:43 AM

## 2021-04-16 NOTE — PLAN OF CARE
Problem: Pain:  Goal: Pain level will decrease  Description: Pain level will decrease  Outcome: Ongoing  Note: Pain level assessment complete. Pt rated pain at 9/10. Pt educated on pain scale and control interventions. PRN pain medication given per pt request. Pt instructed to call out with new onset of pain or unrelieved pain. Will continue to monitor. '  Goal: Control of acute pain  Description: Control of acute pain  Outcome: Ongoing     Problem: Falls - Risk of:  Goal: Will remain free from falls  Description: Will remain free from falls  Outcome: Ongoing  Note: Patient remained free from injury. Patient verbalized understanding of need for the safety precautions. Demonstrates proper use of assistive devices. Bed remains in the lowest position. Call light remains within reach. [place] Program in use.      Goal: Absence of physical injury  Description: Absence of physical injury  Outcome: Ongoing

## 2021-04-16 NOTE — PROGRESS NOTES
Occupational Therapy   Occupational Therapy Initial Assessment  Date: 2021   Patient Name: Marilu Jeong  MRN: [id number]     : 1960     Copied from chart:  Patient presents for PAT appointment, complains of chronic lumbar pain. He has had multiple procedures in the past, including lumbar laminectomy, nerve blocks, spinal cord stimulator placed and removed, etc.  He currently has lumbar pain, BLE pain with numbness/tingling. He has been scheduled for lumbar fusion. Date of Service: 2021    Discharge Recommendations:  Patient would benefit from continued therapy after discharge  OT Equipment Recommendations  Equipment Needed: Yes  Mobility Devices: ADL Assistive Devices  ADL Assistive Devices: Shower Chair with back; Hand-held Shower;Grab Bars - toilet    Assessment   Performance deficits / Impairments: Decreased functional mobility ; Decreased endurance;Decreased ADL status; Decreased balance;Decreased high-level IADLs  Assessment: Pt awake, alert, and agreeable to OT evaluation this date. Pt bed mobility CGA for safety and appropriate use of log roll technique. Pts functional transfers min A for safety without use of AD. Pts functional mobility min A for safety without use of AD. Pts ADLs currently min-mod A. Pt is limited by pain, however, motivated for participation in occupational tasks. Pt completed incentive spirometer X 10 reps with pt achieving 2000mL on each rep. Pt was educated on hourly use of incentive spirometer with verbalization of good understanding. Pt would benefit from skilled occupational therapy services to address safe functional transfers, safe functional mobility, safe self care, and pain management to increase his independence and safety with ADL and IADL tasks upon discharge. Prognosis: Good  Decision Making: Medium Complexity  Patient Education: OT role, OT POC, OT goals, bed mobility training, safe transfer training, safe functional mobility, and breathing techniques.  Pt was receptive to all education and verbalized understanding of training completed. REQUIRES OT FOLLOW UP: Yes  Activity Tolerance  Activity Tolerance: Patient Tolerated treatment well;Patient limited by pain  Safety Devices  Safety Devices in place: Yes  Type of devices: Left in bed;Nurse notified;Call light within reach;Gait belt  Restraints  Initially in place: No        Patient Diagnosis(es): There were no encounter diagnoses. has a past medical history of Chronic back pain, COVID-19, COVID-19, Headache(784.0), Kidney stones, Osteoarthritis, Snores, Wellness examination, Wellness examination, and Wellness examination. has a past surgical history that includes Colonoscopy (2012); lumbar laminectomy; Ureter stent placement; Lithotripsy (08/2018); Spinal Cord Stimulator Surgery (01/30/2020); Spinal Cord Stimulator Surgery (N/A, 01/30/2020); Anesthesia Nerve Block (N/A, 08/14/2020); Nerve Block (Bilateral, 08/21/2020); Pain management procedure (Right, 09/04/2020); Pain management procedure (Left, 09/11/2020); back surgery; lumbar fusion (04/15/2021); and lumbar fusion (N/A, 4/15/2021). Restrictions  Restrictions/Precautions  Restrictions/Precautions: Fall Risk(Activity as tolerated, ambulate patient)  Required Braces or Orthoses?: No  Position Activity Restriction  Spinal Precautions: No Bending, No Lifting, No Twisting  Spinal Precautions: Lumbar fusion, L3-4. L4-5  Other position/activity restrictions: s/p L3-4, L4-5  POSTERIOR LUMBAR FUSION 4/15/2021    Subjective   General  Patient assessed for rehabilitation services?: Yes  Family / Caregiver Present: No  Diagnosis: s/p L3-4, L4-5  POSTERIOR LUMBAR FUSION 4/15/2021  General Comment  Comments: Pts primary RN Ok'd pt to be seen by OT prior to entry into pts room. Pt was easily awakened, alert, and agreeable to OT evaluation this date.   Patient Currently in Pain: Yes  Pain Assessment  Pain Assessment: 0-10  Pain Level: 6  Pain Type: Surgical pain  Pain Location: Back  Pain Orientation: Lower  Pain Descriptors: Sore;Aching  Non-Pharmaceutical Pain Intervention(s): Distraction;Repositioned; Therapeutic presence  Response to Pain Intervention: Patient Satisfied  Vital Signs  Patient Currently in Pain: Yes     Social/Functional History  Social/Functional History  Lives With: Spouse  Type of Home: House  Home Layout: One level  Home Access: Stairs to enter without rails  Entrance Stairs - Number of Steps: 1 step to enter  Bathroom Shower/Tub: Curtain, Walk-in shower  Bathroom Toilet: Handicap height  Home Equipment: Rolling walker, [place], 4 wheeled walker(DME stored in basement from family member, does not use)  Receives Help From: Family(supportive spouse and son who lives near by)  ADL Assistance: [address] [place] [address] Avenue: Independent  Homemaking Responsibilities: Yes(all home management shared with spouse)  Meal Prep Responsibility: Secondary  Laundry Responsibility: Secondary  Cleaning Responsibility: Secondary  Bill Paying/Finance Responsibility: Secondary  Shopping Responsibility: Secondary(able to push cart and mobilize throughout store)  [address]: Independent  Transfer Assistance: Independent  Active : Yes  Mode of Transportation: Truck  Occupation: Full time employment  Type of occupation: Works at [address] [place] [address] 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  Additional Comments: Spouse works full time day shift     Objective   Vision: Impaired  Vision Exceptions: Wears glasses for reading(glasses left at home)  Hearing: Within functional limits          Balance  Sitting Balance: Stand by assistance(Pt ilana's short sitting position EOB ~ 10 minutes with BUE support on bed surface with SBA provided for safety.)  Standing Balance: Minimal assistance(for safety)  Standing Balance  Time: ~ 2-3 minutes  Activity: static standing prior to functional mobility, standing urine void, standing hand hygiene  Comment: Pt completed all standing without use of AD. Functional Mobility  Functional - Mobility Device: No device  Activity: To/from bathroom  Assist Level: Minimal assistance  Functional Mobility Comments: Pt completed all functional mobility without use of AD. Min A provided for safety. ADL  Feeding: Setup; Increased time to complete;Modified independent   Grooming: Setup; Increased time to complete;Minimal assistance(Pt completed hand hygiene from standing position sink side with min A for safety in standing position)  UE Bathing: Minimal assistance;Setup; Increased time to complete  LE Bathing: Moderate assistance;Setup; Increased time to complete  UE Dressing: Minimal assistance;Setup; Increased time to complete  LE Dressing: Setup; Increased time to complete;Minimal assistance(Pt demo's ability to doff/don B socks SBA for safety from seated position EOB using figure-4 positioning. Pt requires min A in standing position for clothing management.)  Toileting: Minimal assistance;Setup; Increased time to complete(Pt completed standing urine void with min A in standing position for safety during clothing management tasks.)  Tone RUE  RUE Tone: Normotonic  Tone LUE  LUE Tone: Normotonic  Coordination  Movements Are Fluid And Coordinated: Yes     Bed mobility  Rolling to Left: Stand by assistance(for safety)  Supine to Sit: Contact guard assistance(for safety and tactile support during trunk progression)  Sit to Supine: Stand by assistance(for safety)  Scooting: Stand by assistance(for safety)  Comment: Pt completed bed mobility from flat bed surface without use of bedrail. Log rolling technique reviewed prior to completion of log rolling technique. Pt demo's good carryover of training. Transfers  Sit to stand: Minimal assistance  Stand to sit: Minimal assistance  Transfer Comments: Min A provided for safety. No use of AD during functional transfers.      Cognition  Overall Cognitive Status: [facility]        Sensation  Overall Sensation Status: WFL(Pt noting no numbness or tingling)        LUE AROM (degrees)  LUE AROM : WFL  Left Hand AROM (degrees)  Left Hand AROM: WFL  RUE AROM (degrees)  RUE AROM : WFL  Right Hand AROM (degrees)  Right Hand AROM: WFL  LUE Strength  Gross LUE Strength: WFL  L Hand General: 4/5  LUE Strength Comment: Grossly 4/5  RUE Strength  Gross RUE Strength: WFL  R Hand General: 4/5  RUE Strength Comment: Grossly 4/5     Plan   Plan  Times per week: 3-5 x/wk  Current Treatment Recommendations: Patient/Caregiver Education & Training, Equipment Evaluation, Education, & procurement, Balance Training, Functional Mobility Training, Endurance Training, Pain Management, Safety Education & Training, Self-Care / ADL    AM-PAC Score        AM-PAC Inpatient Daily Activity Raw Score: 18 (04/16/21 1530)  AM-PAC Inpatient ADL T-Scale Score : 38.66 (04/16/21 1530)  ADL Inpatient CMS 0-100% Score: 46.65 (04/16/21 1530)  ADL Inpatient CMS G-Code Modifier : CK (04/16/21 1530)    Goals  Short term goals  Time Frame for Short term goals: By discharge, pt will:  Short term goal 1: demo functional transfers mod I with use of LRD PRN to increase independence with ADLs  Short term goal 2: demo functional mobility mod I with use of LRD PRN to improve safety with home mobility  Short term goal 3: demo standing tolerance during ADL completion mod I x 10 minutes with use of LRD PRN to improve balance needed for IADLs  Short term goal 4: demo UB ADLs and grooming tasks mod I  Short term goal 5: demo LB ADLs and toileting tasks mod I with use of AE PRN  Short term goal 6: identify and implement 2 pain-relieving techniques with < 2 VC's for improved participation in functional tasks       Therapy Time   Individual Concurrent Group Co-treatment   Time In 1430         Time Out 1455         Minutes 25         Timed Code Treatment Minutes: 24 Minutes       Baldemar Cummings, S/OT

## 2021-04-16 NOTE — PROGRESS NOTES
Eboni Marie with [facility] saw pt, he stated he has two rolling walkers at home and does not need one.

## 2021-04-16 NOTE — PLAN OF CARE
Problem: Pain:  Goal: Pain level will decrease  Description: Pain level will decrease  4/16/2021 0934 by Laurent Goldsmith RN  Outcome: Ongoing  4/16/2021 0303 by Alexa Monte RN  Outcome: Ongoing  Note: Pain level assessment complete. Pt rated pain at 9/10. Pt educated on pain scale and control interventions. PRN pain medication given per pt request. Pt instructed to call out with new onset of pain or unrelieved pain. Will continue to monitor. '  Goal: Control of acute pain  Description: Control of acute pain  4/16/2021 0934 by Laurent Goldsmith RN  Outcome: Ongoing  4/16/2021 0303 by Alexa Monte RN  Outcome: Ongoing  Goal: Control of chronic pain  Description: Control of chronic pain  Outcome: Ongoing     Problem: Falls - Risk of:  Goal: Will remain free from falls  Description: Will remain free from falls  4/16/2021 0934 by Laurent Goldsmtih RN  Outcome: Ongoing  4/16/2021 0303 by Alexa Monte RN  Outcome: Ongoing  Note: Patient remained free from injury. Patient verbalized understanding of need for the safety precautions. Demonstrates proper use of assistive devices. Bed remains in the lowest position. Call light remains within reach. [facility] Program in use.      Goal: Absence of physical injury  Description: Absence of physical injury  4/16/2021 0934 by Laurent Goldsmith RN  Outcome: Ongoing  4/16/2021 0303 by Alexa Monte RN  Outcome: Ongoing

## 2021-04-17 PROCEDURE — 97110 THERAPEUTIC EXERCISES: CPT

## 2021-04-17 PROCEDURE — 6360000002 HC RX W HCPCS: Performed by: REGISTERED NURSE

## 2021-04-17 PROCEDURE — 2580000003 HC RX 258: Performed by: REGISTERED NURSE

## 2021-04-17 PROCEDURE — 1200000000 HC SEMI PRIVATE

## 2021-04-17 PROCEDURE — 6370000000 HC RX 637 (ALT 250 FOR IP): Performed by: REGISTERED NURSE

## 2021-04-17 PROCEDURE — 97116 GAIT TRAINING THERAPY: CPT

## 2021-04-17 RX ADMIN — ACETAMINOPHEN 650 MG: 325 TABLET ORAL at 11:21

## 2021-04-17 RX ADMIN — ACETAMINOPHEN 650 MG: 325 TABLET ORAL at 16:51

## 2021-04-17 RX ADMIN — OXYCODONE HYDROCHLORIDE 10 MG: 5 TABLET ORAL at 16:51

## 2021-04-17 RX ADMIN — DOCUSATE SODIUM 50MG AND SENNOSIDES 8.6MG 1 TABLET: 8.6; 5 TABLET, FILM COATED ORAL at 19:39

## 2021-04-17 RX ADMIN — SODIUM CHLORIDE, PRESERVATIVE FREE 10 ML: 5 INJECTION INTRAVENOUS at 19:39

## 2021-04-17 RX ADMIN — PREGABALIN 150 MG: 75 CAPSULE ORAL at 19:39

## 2021-04-17 RX ADMIN — OXYCODONE HYDROCHLORIDE 10 MG: 5 TABLET ORAL at 04:57

## 2021-04-17 RX ADMIN — METHOCARBAMOL TABLETS 1500 MG: 750 TABLET, COATED ORAL at 08:16

## 2021-04-17 RX ADMIN — FAMOTIDINE 20 MG: 20 TABLET, FILM COATED ORAL at 08:15

## 2021-04-17 RX ADMIN — SODIUM CHLORIDE, PRESERVATIVE FREE 10 ML: 5 INJECTION INTRAVENOUS at 08:15

## 2021-04-17 RX ADMIN — PREGABALIN 150 MG: 75 CAPSULE ORAL at 08:22

## 2021-04-17 RX ADMIN — ACETAMINOPHEN 650 MG: 325 TABLET ORAL at 23:18

## 2021-04-17 RX ADMIN — FAMOTIDINE 20 MG: 20 TABLET, FILM COATED ORAL at 19:39

## 2021-04-17 RX ADMIN — METHOCARBAMOL TABLETS 1500 MG: 750 TABLET, COATED ORAL at 19:39

## 2021-04-17 RX ADMIN — ACETAMINOPHEN 650 MG: 325 TABLET ORAL at 04:56

## 2021-04-17 RX ADMIN — ENOXAPARIN SODIUM 40 MG: 40 INJECTION, SOLUTION INTRAVENOUS; SUBCUTANEOUS at 08:16

## 2021-04-17 RX ADMIN — DOCUSATE SODIUM 50MG AND SENNOSIDES 8.6MG 1 TABLET: 8.6; 5 TABLET, FILM COATED ORAL at 08:15

## 2021-04-17 ASSESSMENT — PAIN SCALES - GENERAL
PAINLEVEL_OUTOF10: 7
PAINLEVEL_OUTOF10: 8
PAINLEVEL_OUTOF10: 3
PAINLEVEL_OUTOF10: 5

## 2021-04-17 ASSESSMENT — PAIN DESCRIPTION - ORIENTATION
ORIENTATION: LOWER
ORIENTATION: LOWER

## 2021-04-17 ASSESSMENT — PAIN DESCRIPTION - PAIN TYPE
TYPE: SURGICAL PAIN
TYPE: ACUTE PAIN;SURGICAL PAIN

## 2021-04-17 ASSESSMENT — PAIN DESCRIPTION - PROGRESSION
CLINICAL_PROGRESSION: GRADUALLY IMPROVING
CLINICAL_PROGRESSION: GRADUALLY IMPROVING

## 2021-04-17 ASSESSMENT — PAIN DESCRIPTION - ONSET: ONSET: ON-GOING

## 2021-04-17 ASSESSMENT — PAIN DESCRIPTION - FREQUENCY: FREQUENCY: CONTINUOUS

## 2021-04-17 ASSESSMENT — PAIN DESCRIPTION - DESCRIPTORS: DESCRIPTORS: ACHING;SHOOTING

## 2021-04-17 NOTE — PLAN OF CARE
Problem: Airway Clearance - Ineffective  Goal: Achieve or maintain patent airway  4/17/2021 1714 by Matthew Franco RN  Outcome: Ongoing  4/17/2021 0429 by Hailey Vaughan RN  Outcome: Ongoing     Problem: Gas Exchange - Impaired  Goal: Absence of hypoxia  4/17/2021 1714 by Matthew Franco RN  Outcome: Ongoing  4/17/2021 0429 by Hailey Vaughan RN  Outcome: Ongoing  Goal: Promote optimal lung function  4/17/2021 1714 by Matthew Franco RN  Outcome: Ongoing  4/17/2021 0429 by Hailey Vaughan RN  Outcome: Ongoing     Problem: Breathing Pattern - Ineffective  Goal: Ability to achieve and maintain a regular respiratory rate  4/17/2021 1714 by Matthew Franco RN  Outcome: Ongoing  4/17/2021 0429 by Hailey Vaughan RN  Outcome: Ongoing     Problem:  Body Temperature -  Risk of, Imbalanced  Goal: Ability to maintain a body temperature within defined limits  4/17/2021 1714 by Matthew Franco RN  Outcome: Ongoing  4/17/2021 0429 by Hailey Vaughan RN  Outcome: Ongoing  Goal: Will regain or maintain usual level of consciousness  4/17/2021 1714 by Matthew Franco RN  Outcome: Ongoing  4/17/2021 0429 by Hailey Vaughan RN  Outcome: Ongoing  Goal: Complications related to the disease process, condition or treatment will be avoided or minimized  4/17/2021 1714 by Matthew Franco RN  Outcome: Ongoing  4/17/2021 0429 by Hailey Vaughan RN  Outcome: Ongoing     Problem: Isolation Precautions - Risk of Spread of Infection  Goal: Prevent transmission of infection  4/17/2021 1714 by Matthew Franco RN  Outcome: Ongoing  4/17/2021 0429 by Hailey Vaughan RN  Outcome: Ongoing     Problem: Nutrition Deficits  Goal: Optimize nutritional status  4/17/2021 1714 by Matthew Franco RN  Outcome: Ongoing  4/17/2021 0429 by Hailey Vaughan RN  Outcome: Ongoing     Problem: Risk for Fluid Volume Deficit  Goal: Maintain normal heart rhythm  4/17/2021 1714 by Matthew Franco RN  Outcome: Ongoing  4/17/2021 0429 by Hailey Vaughan RN  Outcome: Ongoing  Goal: Maintain absence of muscle cramping  4/17/2021 1714 by Miky Kincaid RN  Outcome: Ongoing  4/17/2021 0429 by Jj Castaneda RN  Outcome: Ongoing  Goal: Maintain normal serum potassium, sodium, calcium, phosphorus, and pH  4/17/2021 1714 by Miky Kincaid RN  Outcome: Ongoing  4/17/2021 0429 by Jj Castaneda RN  Outcome: Ongoing     Problem: Loneliness or Risk for Loneliness  Goal: Demonstrate positive use of time alone when socialization is not possible  4/17/2021 1714 by Miky Kincaid RN  Outcome: Ongoing  4/17/2021 0429 by Jj Castaneda RN  Outcome: Ongoing     Problem: Fatigue  Goal: Verbalize increase energy and improved vitality  4/17/2021 1714 by Miky Kincaid RN  Outcome: Ongoing  4/17/2021 0429 by Jj Castaneda RN  Outcome: Ongoing     Problem: Patient Education: Go to Patient Education Activity  Goal: Patient/Family Education  4/17/2021 1714 by Miky Kincaid RN  Outcome: Ongoing  4/17/2021 0429 by Jj Castaneda RN  Outcome: Ongoing     Problem: Pain:  Goal: Pain level will decrease  Description: Pain level will decrease  4/17/2021 1714 by Miky Kincaid RN  Outcome: Ongoing  4/17/2021 0429 by Jj Castaneda RN  Outcome: Ongoing  Goal: Control of acute pain  Description: Control of acute pain  4/17/2021 1714 by Miky Kincaid RN  Outcome: Ongoing  4/17/2021 0429 by Jj Castaneda RN  Outcome: Ongoing  Goal: Control of chronic pain  Description: Control of chronic pain  4/17/2021 1714 by Miky Kincaid RN  Outcome: Ongoing  4/17/2021 0429 by Jj Castaneda RN  Outcome: Ongoing     Problem: Falls - Risk of:  Goal: Will remain free from falls  Description: Will remain free from falls  4/17/2021 1714 by Miky Kincaid RN  Outcome: Ongoing  4/17/2021 0429 by Jj Castaneda RN  Outcome: Ongoing  Goal: Absence of physical injury  Description: Absence of physical injury  4/17/2021 1714 by Miky Kincaid RN  Outcome: Ongoing  4/17/2021 0429 by Jj Castaneda RN  Outcome: Ongoing

## 2021-04-17 NOTE — PROGRESS NOTES
neurologic status.       Memo Cuevas MD  PGY-3 Neurology Resident Physician  Neurosurgery/Neuro Critical Care Team  Pager [phone]

## 2021-04-17 NOTE — FLOWSHEET NOTE
Assessment: The patient was calm and approachable. Intervention:  engaged in active listening.  asked if they would like prayer and informed them chaplains are available 24/7. Outcome: They engaged in the conversation. Chaplains will remain available to offer spiritual and emotional support as needed. 04/17/21 1351   Encounter Summary   Services provided to: Patient   Referral/Consult From: [facility] Family members   Continue Visiting   (04/17/21)   Complexity of Encounter Moderate   Length of Encounter 15 minutes   Spiritual Assessment Completed Yes   Routine   Type Initial   Assessment Calm; Approachable   Intervention Active listening   Outcome Engaged in conversation

## 2021-04-17 NOTE — PLAN OF CARE
Problem: Airway Clearance - Ineffective  Goal: Achieve or maintain patent airway  Outcome: Ongoing     Problem: Airway Clearance - Ineffective  Goal: Achieve or maintain patent airway  Outcome: Ongoing     Problem: Airway Clearance - Ineffective  Goal: Achieve or maintain patent airway  Outcome: Ongoing     Problem: Airway Clearance - Ineffective  Goal: Achieve or maintain patent airway  Outcome: Ongoing     Problem: Airway Clearance - Ineffective  Goal: Achieve or maintain patent airway  Outcome: Ongoing     Problem: Airway Clearance - Ineffective  Goal: Achieve or maintain patent airway  Outcome: Ongoing     Problem: Airway Clearance - Ineffective  Goal: Achieve or maintain patent airway  Outcome: Ongoing     Problem: Airway Clearance - Ineffective  Goal: Achieve or maintain patent airway  Outcome: Ongoing

## 2021-04-17 NOTE — PROGRESS NOTES
Patient walked entire lap around nursing unit with walker and returned to chair, no discomfort stated by patient.

## 2021-04-17 NOTE — PROGRESS NOTES
Physical Therapy  Facility/Department: [facility] NEURO  Daily Treatment Note  NAME: Selene Quiles  : 1960  MRN: [id number]    Date of Service: 2021    Discharge Recommendations:  Patient would benefit from continued therapy after discharge   PT Equipment Recommendations  Equipment Needed: Yes  Mobility Devices: Beto Kehr: Rolling    Assessment   Body structures, Functions, Activity limitations: Decreased functional mobility ; Decreased endurance;Decreased strength  Assessment: Pt amb 300 ft with RW and SBA. Pt likely ok to d/c to prior living arrangements with use of RW  Prognosis: Good  Activity Tolerance  Activity Tolerance: Patient Tolerated treatment well     Patient Diagnosis(es): The primary encounter diagnosis was S/P lumbar fusion. A diagnosis of Other spondylosis with radiculopathy, lumbar region was also pertinent to this visit. has a past medical history of Chronic back pain, COVID-19, COVID-19, Headache(784.0), Kidney stones, Osteoarthritis, Snores, Wellness examination, Wellness examination, and Wellness examination. has a past surgical history that includes Colonoscopy (); lumbar laminectomy; Ureter stent placement; Lithotripsy (2018); Spinal Cord Stimulator Surgery (2020); Spinal Cord Stimulator Surgery (N/A, 2020); Anesthesia Nerve Block (N/A, 2020); Nerve Block (Bilateral, 2020); Pain management procedure (Right, 2020); Pain management procedure (Left, 2020); back surgery; lumbar fusion (04/15/2021); and lumbar fusion (N/A, 4/15/2021). Restrictions  Restrictions/Precautions  Restrictions/Precautions: Fall Risk  Required Braces or Orthoses?: No  Position Activity Restriction  Spinal Precautions: No Bending, No Lifting, No Twisting  Spinal Precautions: Lumbar fusion, L3-4.  L4-5  Other position/activity restrictions: s/p L3-4, L4-5  POSTERIOR LUMBAR FUSION 4/15/2021  Subjective   General  Response To Previous Treatment: Patient with no home    Plan    Plan  Times per week: 6-7 days/week  Times per day: Daily  Current Treatment Recommendations: Strengthening, Transfer Training, Balance Training, Gait Training, Stair training, Functional Mobility Training, Patient/Caregiver Education & Training  Safety Devices  Type of devices: Call light within reach, Left in bed, All fall risk precautions in place  Restraints  Initially in place: No     Therapy Time   Individual Concurrent Group Co-treatment   Time In 0850         Time Out 0916         Minutes 26         Timed Code Treatment Minutes: [facility]

## 2021-04-18 VITALS
RESPIRATION RATE: 18 BRPM | WEIGHT: 192 LBS | SYSTOLIC BLOOD PRESSURE: 147 MMHG | HEIGHT: 68 IN | OXYGEN SATURATION: 96 % | BODY MASS INDEX: 29.1 KG/M2 | TEMPERATURE: 99.1 F | HEART RATE: 77 BPM | DIASTOLIC BLOOD PRESSURE: 68 MMHG

## 2021-04-18 PROCEDURE — 6370000000 HC RX 637 (ALT 250 FOR IP): Performed by: STUDENT IN AN ORGANIZED HEALTH CARE EDUCATION/TRAINING PROGRAM

## 2021-04-18 PROCEDURE — 2580000003 HC RX 258: Performed by: REGISTERED NURSE

## 2021-04-18 PROCEDURE — 6370000000 HC RX 637 (ALT 250 FOR IP): Performed by: REGISTERED NURSE

## 2021-04-18 PROCEDURE — 99024 POSTOP FOLLOW-UP VISIT: CPT | Performed by: NEUROLOGICAL SURGERY

## 2021-04-18 PROCEDURE — 6360000002 HC RX W HCPCS: Performed by: REGISTERED NURSE

## 2021-04-18 RX ORDER — METHOCARBAMOL 750 MG/1
750 TABLET, FILM COATED ORAL 3 TIMES DAILY PRN
Qty: 30 TABLET | Refills: 0 | Status: SHIPPED | OUTPATIENT
Start: 2021-04-18 | End: 2021-04-28

## 2021-04-18 RX ORDER — ONDANSETRON 4 MG/1
4 TABLET, FILM COATED ORAL ONCE
Status: COMPLETED | OUTPATIENT
Start: 2021-04-18 | End: 2021-04-18

## 2021-04-18 RX ORDER — SENNA AND DOCUSATE SODIUM 50; 8.6 MG/1; MG/1
1 TABLET, FILM COATED ORAL 2 TIMES DAILY PRN
Qty: 30 TABLET | Refills: 0 | Status: SHIPPED | OUTPATIENT
Start: 2021-04-18 | End: 2021-05-18

## 2021-04-18 RX ADMIN — DOCUSATE SODIUM 50MG AND SENNOSIDES 8.6MG 1 TABLET: 8.6; 5 TABLET, FILM COATED ORAL at 08:24

## 2021-04-18 RX ADMIN — SODIUM CHLORIDE, PRESERVATIVE FREE 10 ML: 5 INJECTION INTRAVENOUS at 08:24

## 2021-04-18 RX ADMIN — ACETAMINOPHEN 650 MG: 325 TABLET ORAL at 04:24

## 2021-04-18 RX ADMIN — ONDANSETRON HYDROCHLORIDE 4 MG: 4 TABLET, FILM COATED ORAL at 10:46

## 2021-04-18 RX ADMIN — FAMOTIDINE 20 MG: 20 TABLET, FILM COATED ORAL at 08:24

## 2021-04-18 RX ADMIN — ENOXAPARIN SODIUM 40 MG: 40 INJECTION, SOLUTION INTRAVENOUS; SUBCUTANEOUS at 08:23

## 2021-04-18 RX ADMIN — Medication 30 ML: at 08:41

## 2021-04-18 RX ADMIN — BISACODYL 10 MG: 10 SUPPOSITORY RECTAL at 10:37

## 2021-04-18 RX ADMIN — ACETAMINOPHEN 650 MG: 325 TABLET ORAL at 11:16

## 2021-04-18 RX ADMIN — PREGABALIN 150 MG: 75 CAPSULE ORAL at 08:24

## 2021-04-18 ASSESSMENT — PAIN DESCRIPTION - PROGRESSION: CLINICAL_PROGRESSION: NOT CHANGED

## 2021-04-18 ASSESSMENT — PAIN DESCRIPTION - DESCRIPTORS: DESCRIPTORS: ACHING;SORE

## 2021-04-18 ASSESSMENT — PAIN DESCRIPTION - FREQUENCY: FREQUENCY: CONTINUOUS

## 2021-04-18 ASSESSMENT — PAIN DESCRIPTION - LOCATION: LOCATION: BACK

## 2021-04-18 NOTE — DISCHARGE SUMMARY
[facility]     Department of Neurosurgery     INPATIENT DISCHARGE SUMMARY        Patient Identification:  Cecily Cabot is a 63 y.o. male. :  1960  MRN: [id number]     Acct: [de-identified]   Admit Date:  4/15/2021  Discharge date and time: 2021 11:48 AM   Attending Provider: No att. providers found                                     Admission Diagnoses:   Other spondylosis with radiculopathy, lumbar region [M47.26]    Discharge Diagnoses: Active Problems:    Other spondylosis with radiculopathy, lumbar region  Resolved Problems:    * No resolved hospital problems. *       Consults:   none    Brief Inpatient course:  68-year-old male with past medical history of chronic back pain difficult procedure done in the past, headache, stiff osteoarthritis presented with lumbar spondylosis with neurogenic claudication and lumbar instability. Patient was admitted for lumbar fusion. Patient underwent      Procedures:    Lumbar spondylosis with neurogenic claudication, lumbar instability. Underwent posterior lumbar fusion L3 L5. Patient tolerated the procedure. Procedure was uncomplicated. Drain was successfully removed. Patient was given was given LSO rigid brace. On the day of discharge patient alert oriented x4, denies any back pain, discharge/drainage from the incision site. Patient using brace while sitting upright or walking and is advised to continue doing that.         Any Hospital Acquired Infections: none    Discharge Functional Status:  stable    Disposition: home    Patient Instructions:   Current Discharge Medication List      START taking these medications    Details   methocarbamol (ROBAXIN) 750 MG tablet Take 1 tablet by mouth 3 times daily as needed (pain)  Qty: 30 tablet, Refills: 0      sennosides-docusate sodium (SENOKOT-S) 8.6-50 MG tablet Take 1 tablet by mouth 2 times daily as needed for Constipation  Qty: 30 tablet, Refills: 0         CONTINUE these medications which have NOT CHANGED    Details   HYDROcodone-acetaminophen (NORCO) 5-325 MG per tablet Take 1 tablet by mouth every 6 hours as needed for Pain for up to 30 days. Qty: 30 tablet, Refills: 0    Comments: Reduce doses taken as pain becomes manageable  Associated Diagnoses: Chronic bilateral low back pain with bilateral sciatica      pregabalin (LYRICA) 150 MG capsule Take 1 capsule by mouth 2 times daily for 30 days. Qty: 60 capsule, Refills: 3    Associated Diagnoses: Chronic bilateral low back pain with bilateral sciatica      ibuprofen (ADVIL;MOTRIN) 600 MG tablet Take 1 tablet by mouth every 6 hours as needed for Pain  Qty: 120 tablet, Refills: 3      Handicap Placard [facility] It is my medical opinion that Beth Gillette requires a disability parking placard for the following reasons:  He has limited walking ability due to an arthritic condition. Duration of need: permanent  Qty: 1 each, Refills: 0      ondansetron (ZOFRAN) 4 MG tablet TAKE ONE TABLET BY MOUTH EVERY 8 HOURS AS NEEDED FOR NAUSEA  Qty: 90 tablet, Refills: 0             Activity: activity as tolerated    Diet: regular diet    Follow-up:    PAUL Morales - CNP  [address] [place] [address], [address] [facility] [address] [place], [place] [phone]    In 2 weeks  neurosurgery please follow up in 2 weeks for post op wound check and staple removal       Follow up labs:      Follow up imaging:     Note that over 30 minutes was spent in preparing discharge papers, discussing discharge with patient, medication review, etc.      Zachary Cheng MD,  PGY 2 Neurology Resident  Department of Neurosurgery   [address]  4/20/2021, 12:12 PM

## 2021-04-18 NOTE — PLAN OF CARE
Problem: Airway Clearance - Ineffective  Goal: Achieve or maintain patent airway  4/17/2021 2227 by Danni Kern RN  Outcome: Ongoing  4/17/2021 1714 by Denise Young RN  Outcome: Ongoing     Problem: Gas Exchange - Impaired  Goal: Absence of hypoxia  4/17/2021 2227 by Danni Kern RN  Outcome: Ongoing  4/17/2021 1714 by Denise Young RN  Outcome: Ongoing  Goal: Promote optimal lung function  4/17/2021 2227 by Danni Kern RN  Outcome: Ongoing  4/17/2021 1714 by Denise Young RN  Outcome: Ongoing     Problem: Breathing Pattern - Ineffective  Goal: Ability to achieve and maintain a regular respiratory rate  4/17/2021 2227 by Danni Kern RN  Outcome: Ongoing  4/17/2021 1714 by Denise Young RN  Outcome: Ongoing     Problem:  Body Temperature -  Risk of, Imbalanced  Goal: Ability to maintain a body temperature within defined limits  4/17/2021 2227 by Danni Kern RN  Outcome: Ongoing  4/17/2021 1714 by Denise Young RN  Outcome: Ongoing  Goal: Will regain or maintain usual level of consciousness  4/17/2021 2227 by Danni Kern RN  Outcome: Ongoing  4/17/2021 1714 by Denise Young RN  Outcome: Ongoing  Goal: Complications related to the disease process, condition or treatment will be avoided or minimized  4/17/2021 2227 by Danni Kern RN  Outcome: Ongoing  4/17/2021 1714 by Denise Young RN  Outcome: Ongoing     Problem: Isolation Precautions - Risk of Spread of Infection  Goal: Prevent transmission of infection  4/17/2021 2227 by Danni Kern RN  Outcome: Ongoing  4/17/2021 1714 by Denise Young RN  Outcome: Ongoing     Problem: Nutrition Deficits  Goal: Optimize nutritional status  4/17/2021 2227 by Danni Kern RN  Outcome: Ongoing  4/17/2021 1714 by Denise Young RN  Outcome: Ongoing     Problem: Risk for Fluid Volume Deficit  Goal: Maintain normal heart rhythm  4/17/2021 2227 by Danni Kern RN  Outcome: Ongoing  4/17/2021 1714 by Denise Young RN  Outcome: Ongoing  Goal: Maintain absence of muscle cramping  4/17/2021 2227 by Leticia Islas RN  Outcome: Ongoing  4/17/2021 1714 by Steven Azevedo RN  Outcome: Ongoing  Goal: Maintain normal serum potassium, sodium, calcium, phosphorus, and pH  4/17/2021 2227 by Leticia Islas RN  Outcome: Ongoing  4/17/2021 1714 by Steven Azevedo RN  Outcome: Ongoing     Problem: Loneliness or Risk for Loneliness  Goal: Demonstrate positive use of time alone when socialization is not possible  4/17/2021 2227 by Leticia Islas RN  Outcome: Ongoing  4/17/2021 1714 by Steven Azevedo RN  Outcome: Ongoing     Problem: Fatigue  Goal: Verbalize increase energy and improved vitality  4/17/2021 2227 by Leticia Islas RN  Outcome: Ongoing  4/17/2021 1714 by Steven Azevedo RN  Outcome: Ongoing     Problem: Patient Education: Go to Patient Education Activity  Goal: Patient/Family Education  4/17/2021 2227 by Leticia Islas RN  Outcome: Ongoing  4/17/2021 1714 by Steven Azevedo RN  Outcome: Ongoing     Problem: Pain:  Goal: Pain level will decrease  Description: Pain level will decrease  4/17/2021 2227 by Leticia Islas RN  Outcome: Ongoing  4/17/2021 1714 by Steven Azevedo RN  Outcome: Ongoing  Goal: Control of acute pain  Description: Control of acute pain  4/17/2021 2227 by Leticia Islas RN  Outcome: Ongoing  4/17/2021 1714 by Steven Azevedo RN  Outcome: Ongoing  Goal: Control of chronic pain  Description: Control of chronic pain  4/17/2021 2227 by Leticia Islas RN  Outcome: Ongoing  4/17/2021 1714 by Steven Azevedo RN  Outcome: Ongoing     Problem: Falls - Risk of:  Goal: Will remain free from falls  Description: Will remain free from falls  4/17/2021 2227 by Leticia Islas RN  Outcome: Ongoing  4/17/2021 1714 by Steven Azevedo RN  Outcome: Ongoing  Goal: Absence of physical injury  Description: Absence of physical injury  4/17/2021 2227 by Leticia Islas RN  Outcome: Ongoing  4/17/2021 1714 by Steven Azevedo RN  Outcome:

## 2021-04-18 NOTE — PROGRESS NOTES
Doing well. Ambulating. ab po. Awaiting BM  pvr < 100cc    cdi wound    Dc home today.  Brace with compression over wound    Russ Bauman DO  Neurosurgery  O: Marlena  C: [address] [facility]

## 2021-04-18 NOTE — PROGRESS NOTES
Neurosurgery Service  Resident Daily Progress Note   4/18/2021 7:05 AM     Subjective    Chart reviewed. Patient examined. No acute events overnight. No new complaints. Patient doing well this morning. Patient denies any headache, fever, chills, tingling numbness. Patient using rigid LSO brace    Objective    Vitals:    04/17/21 1606 04/17/21 1930 04/17/21 2318 04/18/21 0415   BP: 135/62 (!) 135/59 (!) 148/72 (!) 147/68   Pulse: 84 99 89 77   Resp: 16 18 18 18   Temp: 98.8 °F (37.1 °C) 99.8 °F (37.7 °C) 99 °F (37.2 °C) 99.1 °F (37.3 °C)   TempSrc: Oral Oral Oral Oral   SpO2:  99% 96% 96%   Weight:       Height:         Overnight HIRAL 100 mL    Physical Exam:  Gen: Resting comfortably, no acute distress  CV: RRR  Resp: CTAB  GI: Abdomen soft, non-tender  Skin: Cap refill< 2 seconds, surgical incision dressing done. Neuro:    A&Ox3   PERRL, EOMI   CNII-XII intact   Normal speech and mentation  No focal sensory or motor deficits    Labs:  Lab Results   Component Value Date    WBC 14.6 (H) 04/16/2021    HGB 13.3 04/16/2021    HCT 40.2 (L) 04/16/2021     04/16/2021    CHOL 163 03/02/2019    TRIG 119 03/02/2019    HDL 36 (L) 03/02/2019    ALT 23 03/02/2019    AST 17 03/02/2019     03/23/2021    K 4.1 03/23/2021     03/23/2021    CREATININE 0.75 03/23/2021    BUN 13 03/23/2021    CO2 25 03/23/2021    PSA 0.54 03/02/2019    LABA1C 6.3 10/18/2019       Radiology (last 24 hours): No new studies    XR lumbar sine:   L3-5 posterior spinal fusion in satisfactory alignment.  No evidence of   hardware complication. ASSESSMENT & PLAN:    Cristina Reid is a 63 y.o. male who presents with lumbar spondylosis with radiculopathy    POD #3 s/p lumbar fusion L3 -L5  Rigid brace        On Lovenox for DVT prophylaxis  Roxicodone for pain management  Robaxin for muscle spasm  PT OT  Discharge today            -    Please contact neurosurgery with any changes in patient's neurologic status.       Martín NUNEZ Arletha Pallas, MD  PGY-3 Neurology Resident Physician  Neurosurgery/Neuro Critical Care Team  Pager [phone]

## 2021-04-18 NOTE — PROGRESS NOTES
CLINICAL PHARMACY NOTE: MEDS TO [address] Select Patient?: No  Total # of Prescriptions Filled: 2   The following medications were delivered to the patient:  · Methocarbamol 750mg  · Senokot 8.6-50mg  Total # of Interventions Completed: 0  Time Spent (min): 0    Additional Documentation: meds delivered to patient in room 537 4/18 at 10:40am. Co-pay paid with credit on [employer] ($8.93).

## 2021-04-27 ENCOUNTER — OFFICE VISIT (OUTPATIENT)
Dept: NEUROSURGERY | Age: 61
End: 2021-04-27

## 2021-04-27 VITALS
HEART RATE: 67 BPM | HEIGHT: 68 IN | OXYGEN SATURATION: 96 % | WEIGHT: 192 LBS | DIASTOLIC BLOOD PRESSURE: 71 MMHG | SYSTOLIC BLOOD PRESSURE: 119 MMHG | BODY MASS INDEX: 29.1 KG/M2

## 2021-04-27 DIAGNOSIS — M47.26 OTHER SPONDYLOSIS WITH RADICULOPATHY, LUMBAR REGION: Primary | ICD-10-CM

## 2021-04-27 DIAGNOSIS — Z98.1 S/P LUMBAR FUSION: ICD-10-CM

## 2021-04-27 PROCEDURE — 99024 POSTOP FOLLOW-UP VISIT: CPT | Performed by: NURSE PRACTITIONER

## 2021-04-27 RX ORDER — OXYCODONE HYDROCHLORIDE AND ACETAMINOPHEN 5; 325 MG/1; MG/1
1 TABLET ORAL EVERY 8 HOURS PRN
Qty: 21 TABLET | Refills: 0 | Status: SHIPPED | OUTPATIENT
Start: 2021-04-27 | End: 2021-05-04

## 2021-04-27 NOTE — PROGRESS NOTES
[facility]  [facility] [address] [id number]  Dept: [phone]    Patient:  Julio Potts  YOB: 1960  Date: 4/27/21    The patient is a 63 y.o. male who presents today for consult of the following problems:     Chief Complaint   Patient presents with    Post-Op Check     Post op pain         HPI:     Julio Potts is a 63 y.o. male who presents to the office for post-op evaluation s/p lumbar fusion L3-5. Has been experiencing burning pain down his left leg in S1 distribution, particularly while lying flat. Sitting up greatly improves the pain. Denies any fevers, chills, discharge/drainage from the incision. Has been taking Robaxin, Tylenol, occasional Norco for pain. Does not feel that any of them are really providing a great deal of relief. Is also on Lyrica, supposed to take twice daily, only takes once daily. Denies any saddle anesthesia, loss of bowel or bladder function. No focal motor deficits, numbness or tingling. Has been wearing lumbar brace. Strength 5/5  Sensation intact  Incision CDIno erythema, discharge drainage present  Mild swelling superior to incision, no tenderness to palpation    Date of surgery: 4/15/2021    Assessment and Plan:      1. Other spondylosis with radiculopathy, lumbar region    2. S/P lumbar fusion          Plan: Reassurance provided no worrisome findings on exam.  No signs of infection, sensorimotor deficits. Encouraged to take Lyrica twice daily for at least the next couple of weeks to see if this helps with neuropathic pain. Short prescription of Percocet provided to see if this is more effective in controlling symptoms then current Norco.  Continue to monitor, notify office with any worsening symptoms. We will see the patient back later this week for staple and suture removal.    Followup: Return in about 2 days (around 4/29/2021), or if symptoms worsen or fail to improve. Prescriptions Ordered:  Orders Placed This Encounter   Medications    oxyCODONE-acetaminophen (PERCOCET) 5-325 MG per tablet     Sig: Take 1 tablet by mouth every 8 hours as needed for Pain for up to 7 days. Take lowest dose possible to manage pain     Dispense:  21 tablet     Refill:  0     Reduce doses taken as pain becomes manageable        Orders Placed:  No orders of the defined types were placed in this encounter. Electronically signed by PAUL Hamilton CNP on 4/27/2021 at 9:35 AM    Please note that this chart was generated using voice recognition Dragon dictation software. Although every effort was made to ensure the accuracy of this automated transcription, some errors in transcription may have occurred.

## 2021-04-29 ENCOUNTER — OFFICE VISIT (OUTPATIENT)
Dept: NEUROSURGERY | Age: 61
End: 2021-04-29

## 2021-04-29 VITALS
SYSTOLIC BLOOD PRESSURE: 155 MMHG | BODY MASS INDEX: 29.19 KG/M2 | HEART RATE: 61 BPM | WEIGHT: 192 LBS | DIASTOLIC BLOOD PRESSURE: 88 MMHG

## 2021-04-29 DIAGNOSIS — M47.26 OTHER SPONDYLOSIS WITH RADICULOPATHY, LUMBAR REGION: Primary | ICD-10-CM

## 2021-04-29 DIAGNOSIS — Z98.1 S/P LUMBAR FUSION: ICD-10-CM

## 2021-04-29 PROCEDURE — 99024 POSTOP FOLLOW-UP VISIT: CPT | Performed by: NURSE PRACTITIONER

## 2021-04-29 NOTE — PROGRESS NOTES
[facility]  [facility] [address]  [address]  Dept: [phone]    Patient:  Sadi Posey  YOB: 1960  Date: 4/27/21    The patient is a 63 y.o. male who presents today for consult of the following problems:     Chief Complaint   Patient presents with    Post-Op Check     2 wk          HPI:     Sadi Posey is a 63 y.o. male who presents to the office for post-op evaluation s/p lumbar fusion L3-5. Has been experiencing burning pain down his left leg in S1 distribution, particularly while lying flat, this has somewhat improved in the last couple of days. Sitting up greatly improves the pain. Denies any fevers, chills, discharge/drainage from the incision. Has been taking Robaxin, Tylenol, occasional Norco for pain. Denies any saddle anesthesia, loss of bowel or bladder function. No focal motor deficits, numbness or tingling. Has been wearing lumbar brace as directed. Pain today 3/10. Strength 5/5  Sensation intact  Incision CDIno erythema, discharge drainage present    Date of surgery: 4/15/2021    Assessment and Plan:      1. Other spondylosis with radiculopathy, lumbar region    2. S/P lumbar fusion          Plan: Patient with some improvement of left lower extremity radiculopathy. Incision healing well, denies any discharge, drainage, fevers or chills. Some improvement in pain. Has been compliant with lumbar brace. Intact staples and single suture removed without difficulty, patient tolerated well. Referral provided to initiate physical therapy to work on lower extremity range of motion, stretching, strengthening. No back work yet. Patient to return for next postop visit with Dr. Ashley Mina as planned in early June, upright x-rays prior. Followup: Return in about 5 weeks (around 6/3/2021), or if symptoms worsen or fail to improve. Prescriptions Ordered:  No orders of the defined types were placed in this encounter. Orders Placed:  Orders Placed This Encounter   Procedures    XR LUMBAR SPINE (2-3 VIEWS)     Standing Status:   Future     Standing Expiration Date:   4/29/2022     Scheduling Instructions:      STANDING AP AND LATERAL; include both femoral heads     Order Specific Question:   Reason for exam:     Answer:   post-op   [place] [address]     Referral Priority:   Routine     Referral Type:   Eval and Treat     Referral Reason:   Specialty Services Required     Requested Specialty:   Physical Therapy     Number of Visits Requested:   1        Electronically signed by PAUL Mckee CNP on 4/29/2021 at 12:45 PM    Please note that this chart was generated using voice recognition Dragon dictation software. Although every effort was made to ensure the accuracy of this automated transcription, some errors in transcription may have occurred.

## 2021-05-06 DIAGNOSIS — M54.16 LUMBAR RADICULOPATHY: Primary | ICD-10-CM

## 2021-05-06 RX ORDER — PREDNISONE 20 MG/1
60 TABLET ORAL DAILY
Qty: 15 TABLET | Refills: 0 | Status: SHIPPED | OUTPATIENT
Start: 2021-05-06 | End: 2021-05-11

## 2021-05-10 ENCOUNTER — HOSPITAL ENCOUNTER (OUTPATIENT)
Dept: PHYSICAL THERAPY | Age: 61
Setting detail: THERAPIES SERIES
Discharge: HOME | End: 2021-05-10
Payer: COMMERCIAL

## 2021-05-10 PROCEDURE — 97110 THERAPEUTIC EXERCISES: CPT

## 2021-05-10 PROCEDURE — 97162 PT EVAL MOD COMPLEX 30 MIN: CPT

## 2021-05-11 NOTE — PROGRESS NOTES
[address] [facility]  [address] [facility]         Phone: [phone]       Fax: [phone]     Physical Therapy Spine Evaluation    Date:  5/10/2021  Patient: Jj Colón  : 1960  MRN: [id number]  Physician: Selma Lennox CNP     Insurance: [facility] secondary  Medical Diagnosis: M47.26 radicular lumbar symptoms, s/p lumbar fusion Z98.1  Rehab Codes: low back pain M54.5  Onset Date: surgery 4/15/21  Next 's appt. : 21 (8weeks 6/10)  Visit Count:    Cancel/No Show: 0/0    Subjective:   Patient presents to therapy following fusion that occurred on 4/15/21. Prior to surgery, had long standing issues with back pain, degenerative issues with symptoms that had slowly progressed over 30 years. Worked in physical/manual labor per the patient. Currently works at [employer], job tasks include heavier lifting/bending to get into positions to work as . Prior to surgery had significant back pain but stated he did not have complaints of numbness into the legs, now noting intermittent complaints of pain into the (L) toes and weakness of the (L) LE. Currently not able to do AROM of the lumbar spine or lifting/carrying at this time due to restrictions s/p surgery. CC: complaints of low back pain, (L) numbess, (L) weakness  HPI: multilevel fusion as listed above.     PMHx: [] Unremarkable [] Diabetes [] HTN  [] Pacemaker   [] MI/Heart Problems [] Cancer [] Arthritis [] Other:              [x] Refer to full medical chart  In EPIC     Comorbidities:   [] Obesity [] Dialysis  [] Other:   [] Asthma/COPD [] Dementia [] Other:   [] Stroke [] Sleep apnea [] Other:   [] Vascular disease [] Rheumatic disease [] Other:     Tests: [] X-Ray:see EPIC [x] MRI:see EPIC  [] Other:    Medications: [x] Refer to full medical record [] None [] Other:  Allergies:      [x] Refer to full medical record [] None [] Other:    Function:  Hand Dominance  [] flexion, extension 4+/5 (B)  DF, PF, great toe extension 4+/5 (R), (L) 4/5    TESTS (+/-) LEFT RIGHT Not Tested   SLR [] sit [] supine   []   Hamstring (SLR)   []   SKTC   []   DKTC   []   Slump/Dural (+) end range (-) []   SI JT   []   HERMES   []   Joint Mobility   []   Cerv. Comp   []   Cerv. Distraction   []   Cerv. Alar/Transverse   []   Vertebral Artery   []   Adsons   []   Juvenal Grumbles   []     OBSERVATION No Deficit Deficit Not Tested Comments   Posture       Forward Head [] [] []    Rounded Shoulders [] [] []    Kyphosis [] [] []    Lordosis [] [] []    Lateral Shift [] [] []    Scoliosis [] [] []    Iliac Crest [] [] []    PSIS [] [] []    ASIS [] [] []    Genu Valgus [] [] []    Genu Varus [] [] []    Genu Recurvatum [] [] []    Pronation [] [] []    Supination [] [] []    Leg Length Discrp [] [] []    Slumped Sitting [] [] []    Palpation [] [] [] Well healed incision   Sensation [] [] []    Edema [] [] []    Neurological [] [] []      Comments:    Assessment: Patient with limited functional lift/carrying tolerance, limited with (L) LE strength, limited with ROM grossly (not tested s/p surgery). Patient would continue to benefit from skilled physical therapy services in order to: improve LE strength, begin to grossly work functional tolerance and work on ROM once appropriate. Problems:    [x] ? Back Pain: 3/10 and into (L) LE.    [x] ? ROM:  Limited grossly, not tested     [x] ? Strength:  Limited with (L) LE strength testing  STG: (to be met in 6 treatments)  1. ? Pain: Improved pain 1/10 at worst  2. ? ROM: hold until appropriate  3. ? Strength: 4+/5 with long sitting testing  4. ? Function: hold goals until more appropriate  5.  Independent with Home Exercise Programs  LTG:  (to be met in 12 treatments)  6. ? Pain: Improved pain 0/10 at worst  7. ? ROM: flexion to ankles, extension 10 degrees  8. ? Strength: 4+/5 with long sitting testing  9. ? Function: Able to lift/carry up to 30# once appropriate s/p surgery, able to ambulate as needed 25-30 minutes without increased back and LE pain, able to bend/get to work positions with minimal to no pain. 10. Independent with Home Exercise Program  Patient goals: no pain    Functional Assessment Used: optimal 15/3 100% limited  Current Status Score:  Goal Status Sore:     Evaluation Complexity:  History (Personal factors, comorbidities) [] 0 [x] 1-2 [] 3+   Exam (limitations, restrictions) [] 1-2 [x] 3 [] 4+   Clinical presentation (progression) [] Stable [x] Evolving  [] Unstable   Decision Making [] Low [x] Moderate [] High    [] Low Complexity [x] Moderate Complexity [] High Complexity       Rehab Potential:  [] Good  [x] Fair  [] Poor   Suggested Professional Referral:  [] No  [] Yes:  Barriers to Goal Achievement[de-identified]  [x] No  [] Yes:  Domestic Concerns:  [x] No  [] Yes:    Pt. Education:  [x] Plans/Goals, Risks/Benefits discussed  [x] Home exercise program  Method of Education: [x] Verbal  [] Demo  [x] Written  Comprehension of Education:  [x] Verbalizes understanding. [] Demonstrates understanding. [] Needs Review. [] Demonstrates/verbalizes understanding of HEP/Ed previously given.     Treatment Plan:  [x] Therapeutic Exercise   65300  [] Iontophoresis: 4 mg/mL Dexamethasone Sodium Phosphate  mAmin  70182   [] Therapeutic Activity  20479 [] Vasopneumatic cold with compression  36351    [] Gait Training   23664 [] Ultrasound   29928   [x] Neuromuscular Re-education  65805 [] Electrical Stimulation Unattended  99586   [] Manual Therapy  33597 [] Electrical Stimulation Attended  11758   [x] Instruction in HEP  [] Lumbar/Cervical Traction  19338   [x] Aquatic Therapy   12809 [] Cold/hotpack    [] Massage   51344      [] Dry Needling, 1 or 2 muscles  66945   [] Biofeedback, first 15 minutes   04706  [] Biofeedback, additional 15 minutes   41858 [] Dry Needling, 3 or more muscles  71430       []  Medication allergies reviewed for use of    Dexamethasone Sodium Phosphate 4mg/ml     with iontophoresis treatments. Pt is not allergic. Frequency:  2-3 x/week for 12 visits    Todays Treatment:  Modalities:   Precautions:  Exercises:  Exercise Reps/ Time Weight/ Level Comments   Education:   Discussed restrictions, postural modifications          Nerve glides 15x                 Other:    Specific Instructions for next treatment:    Treatment Charges: Mins Units   [] Evaluation       []  Low       []  Moderate       []  High 30 1   []  Modalities     []  Ther Exercise 10 1   []  Manual Therapy     []  Ther Activities     []  Aquatics     []  Neuromuscular     []  Gait Training     []  Dry Needling           1-2 muscles     []  Dry Needling           3 or more muscles     [] Vasocompression     []  Other 40 2     TOTAL TREATMENT TIME: 40    Time in: Ned Boswell      Time out: 1900    Electronically signed by: Lexi Saenz PT    Physician Signature:________________________________Date:__________________  By signing above or cosigning this note, I have reviewed this plan of care and certify a need for medically necessary rehabilitation services.      *PLEASE SIGN ABOVE AND FAX BACK ALL PAGES*

## 2021-05-13 ENCOUNTER — HOSPITAL ENCOUNTER (OUTPATIENT)
Dept: PHYSICAL THERAPY | Age: 61
Setting detail: THERAPIES SERIES
Discharge: HOME | End: 2021-05-13
Payer: COMMERCIAL

## 2021-05-13 PROCEDURE — 97113 AQUATIC THERAPY/EXERCISES: CPT

## 2021-05-13 NOTE — PROGRESS NOTES
SLS        Tandem  15\"x4       Stretches        Achllies        Hamstring                Cool Down 2 laps at rail       Pain Rating 6/7           Specific Instructions for next treatment:      Assessment: emphasis on proper technique and posture throughout program; encouraged small controlled ranges to avoid over exertion and excess pain/soreness. [] Progressing toward goals. [] No change. [] Other:    [] Patient would continue to benefit from skilled physical therapy services in order to: improve LE strength, begin to grossly work functional tolerance and work on ROM once appropriate. Problems:    [x]? ? Back Pain: 3/10 and into (L) LE.                    [x]? ? ROM:  Limited grossly, not tested                  [x]? ? Strength:            Limited with (L) LE strength testing  STG: (to be met in 6 treatments)  1. ? Pain: Improved pain 1/10 at worst  2. ? ROM: hold until appropriate  3. ? Strength: 4+/5 with long sitting testing  4. ? Function: hold goals until more appropriate  5. Independent with Home Exercise Programs  LTG:  (to be met in 12 treatments)  6. ? Pain: Improved pain 0/10 at worst  7. ? ROM: flexion to ankles, extension 10 degrees  8. ? Strength: 4+/5 with long sitting testing  9. ? Function: Able to lift/carry up to 30# once appropriate s/p surgery, able to ambulate as needed 25-30 minutes without increased back and LE pain, able to bend/get to work positions with minimal to no pain. 10. Independent with Home Exercise Program  Patient goals: no pain    Pt. Education:  [x] Yes  [] No  [] Reviewed Prior HEP/Ed  Method of Education: [x] Verbal  [] Demo  [] Written  Comprehension of Education:  [x] Verbalizes understanding. [] Demonstrates understanding. [] Needs review. [] Demonstrates/verbalizes HEP/Ed previously given. Plan: [x] Continue per plan of care.    [] Other:      Treatment Charges: Mins Units   []  Modalities     []  Ther Exercise     []  Manual Therapy     []  Ther Activities     []  Aquatics 25 2   []  Neuromuscular     [] Vasocompression     [] Gait Training     [] Dry needling        [] 1 or 2 muscles        [] 3 or more muscles     []  Other     Total Treatment time 25 2     Time In:555pm            Time Out: 620pm    Electronically signed by:  Syed Tsang PTA

## 2021-05-17 ENCOUNTER — HOSPITAL ENCOUNTER (OUTPATIENT)
Dept: PHYSICAL THERAPY | Age: 61
Setting detail: THERAPIES SERIES
Discharge: HOME | End: 2021-05-17
Payer: COMMERCIAL

## 2021-05-17 PROCEDURE — 97113 AQUATIC THERAPY/EXERCISES: CPT

## 2021-05-17 NOTE — PROGRESS NOTES
[address] [facility]   [address]   Phone: [phone]   Fax: [phone]    Physical Therapy Daily Treatment Note      Date:  2021  Patient Name:  Cristina Reid    :  1960  MRN: [id number]  Physician: Brandon Cruz CNP                            Insurance: [facility] secondary  Medical Diagnosis: M47.26 radicular lumbar symptoms, s/p lumbar fusion Z98.1            Rehab Codes: low back pain M54.5  Onset Date: surgery 4/15/21                  Next 's appt. : 21 (8weeks 6/10)  Visit Count: 3/12                           Cancel/No Show: 0/0    Subjective:    Patient reports he is now able to drive, states he was sore after last visit for a few hours but was fine by the time he went to sleep. States the pain remains on left side with numbness down entire left leg leading to toes. Pain:  [] Yes  [] No Location: LBP Pain Rating: (0-10 scale) 7/10  Pain altered Tx:  [] No  [] Yes  Action:  Comments:    Objective:      [address] [facility] J Exercise Log  Aquatic, Hip & DLS Program- Phase 1    Date of Eval:  5/10/21                              Primary PT: Hakan Pressley   Diagnosis:M47.26 radicular lumbar symptoms, s/p lumbar fusion Z98.1    Things to Focus On (goals):   Surgical Precautions: no twisting,lifting,bending   Medical Precautions:  [] C-9 dates  [] Occ Med   [] Medicare       Date 21      Visit # 2/12 3      Walk F/L/R 2 laps ea at [employer] 2 laps ea at [employer] 10x 2 laps      Squats  10x5\"      Step-Ups F/L  Low box  10x   10x      Step Down F/L        Heel-toe raises 10x 10x      SLR F/L/R 10x 10x      Hip/Knee Flex/Ext        F/L Lunges                Kickboard Ex.         Iso Abd. 10x5\"  10x5\"       Push-pull 10x 10x      Paddling                UE Format:        Horiz Abd/Add        IR/ER (wipers)        Alt Flex/Ext        Alt Press Down        Abd/Add                Deep Water:        [employer]        Jacks        X-Country                Balance        SLS        Tandem  15\"x4 15\"x4      Stretches        Achllies  2x20\"      Hamstring                Cool Down 2 laps at rail 2 laps at rail      Pain Rating 6/7 3          Specific Instructions for next treatment:      Assessment: cueing required for proper technique during squats this date with good carryover, minimal cueing required for postural awareness and core engagement this date. No increased pain throughout entire session. [] Progressing toward goals. [] No change. [] Other:    [] Patient would continue to benefit from skilled physical therapy services in order to: improve LE strength, begin to grossly work functional tolerance and work on ROM once appropriate. Problems:    [x]? ? Back Pain: 3/10 and into (L) LE.                    [x]? ? ROM:  Limited grossly, not tested                  [x]? ? Strength:            Limited with (L) LE strength testing  STG: (to be met in 6 treatments)  1. ? Pain: Improved pain 1/10 at worst  2. ? ROM: hold until appropriate  3. ? Strength: 4+/5 with long sitting testing  4. ? Function: hold goals until more appropriate  5. Independent with Home Exercise Programs  LTG:  (to be met in 12 treatments)  6. ? Pain: Improved pain 0/10 at worst  7. ? ROM: flexion to ankles, extension 10 degrees  8. ? Strength: 4+/5 with long sitting testing  9. ? Function: Able to lift/carry up to 30# once appropriate s/p surgery, able to ambulate as needed 25-30 minutes without increased back and LE pain, able to bend/get to work positions with minimal to no pain. 10. Independent with Home Exercise Program  Patient goals: no pain    Pt. Education:  [x] Yes  [] No  [] Reviewed Prior HEP/Ed  Method of Education: [x] Verbal  [] Demo  [] Written  Comprehension of Education:  [x] Verbalizes understanding. [] Demonstrates understanding. [] Needs review. [] Demonstrates/verbalizes HEP/Ed previously given.      Plan: [x] Continue per plan of care.    [] Other:      Treatment Charges: Mins Units   []  Modalities     []  Ther Exercise     []  Manual Therapy     []  Ther Activities     []  Aquatics 35 2   []  Neuromuscular     [] Vasocompression     [] Gait Training     [] Dry needling        [] 1 or 2 muscles        [] 3 or more muscles     []  Other     Total Treatment time 35 2     Time In:525pm            Time Out: 600pm    Electronically signed by:  Allen Moraes PTA

## 2021-05-20 ENCOUNTER — HOSPITAL ENCOUNTER (OUTPATIENT)
Dept: PHYSICAL THERAPY | Age: 61
Setting detail: THERAPIES SERIES
Discharge: HOME | End: 2021-05-20
Payer: COMMERCIAL

## 2021-05-20 PROCEDURE — 97113 AQUATIC THERAPY/EXERCISES: CPT

## 2021-05-20 NOTE — PROGRESS NOTES
[address] [facility]   [address] [facility] [address]   Phone: [phone]   Fax: [phone]    Physical Therapy Daily Treatment Note      Date:  2021  Patient Name:  Jerry Orozco    :  1960  MRN: [id number]  Physician: Rosette Buckley CNP    ( Dr Bailey Alegria)                         Insurance: [facility] secondary  Medical Diagnosis: M47.26 radicular lumbar symptoms, s/p lumbar fusion Z98.1            Rehab Codes: low back pain M54.5  Onset Date: surgery 4/15/21                  Next 's appt. : 21 (8weeks 6/10)  Visit Count:                            Cancel/No Show: 0/0    Subjective:    Constant burning in L LE. Has difficulty sleeping at night- tries to sleep on R side- awakens at 4 AM with extreme symptoms. Reports L LE symptoms were not present before surgery but are the most limiting now. Wears back brace at all times and wears bone stimulator for 2 hours. Pain:  [x] Yes  [] No Location: LBP- Denies Buttock down L LE to half of foot;  Denies R LE symptoms Pain Rating: (0-10 scale) 3/10  Pain altered Tx:  [x] No  [] Yes  Action:  Comments: Emphasis on core stability and postural awareness throughout program    Objective:      [address] [facility] J Exercise Log  Aquatic, Hip & DLS Program- Phase 1    Date of Eval:  5/10/21                              Primary PT: [place] Beam   Diagnosis:M47.26 radicular lumbar symptoms, s/p lumbar fusion Z98.1    Things to Focus On (goals):   Surgical Precautions: no twisting,lifting,bending - NO DEEP WATER HANGING  Medical Precautions:  [] C-9 dates  [] Occ Med   [] Medicare       Date 21     Visit # 2/12 3/12 4/12     Walk F/L/R 2 laps ea at rail 2 laps ea at rail 2 Laps @ [employer] 10x 2 laps 2 Laps     Squats  10x5\" 10x5\"     Step-Ups F/L  Low box  10x   10x Low 10x     Step Down F/L        Heel-toe raises 10x 10x 10x     SLR F/L/R 10x 10x 10x     Hip/Knee Flex/Ext   10x     F/L Lungsugar                Kickboard Ex. Med     Iso Abd. 10x5\"  10x5\"  10x5\"     Push-pull 10x 10x 10x     Paddling                UE Format:    Add    Horiz Abd/Add        IR/ER (wipers)        Alt Flex/Ext        Alt Press Down        Abd/Add                        Balance        SLS        Tandem  15\"x4 15\"x4 NT     Stretches        Achllies  2x20\" 2x20\"     Hamstring   2x20\"             Cool Down 2 laps at rail 2 laps at rail 2 Laps      Pain Rating 6/7 3 3         Specific Instructions for next treatment: Add UE format to challenge core in chest deep water      Assessment:     [x] Progressing toward goals. Progressed program to tolerance- patient does report mild fear of deep water. Educated patient on radicular symptoms vs centralization of symptoms- no deep water hang at this time due to recent fusion however patient did unload standing in neck deep water (10% WB) at rail for 2'- no change in L LE symptoms reported so activity stopped. Remainder of program performed in 30-40% WB aquatic environment without increased symptom complaints. Also reviewed and demonstrated proper sleeping positions and offered suggestions for side lying in a neutral spine position vs \"half prone /rotation\"- encouraged use of pillows between knees and at torso. Added hip/ knee flex/ext  And hamstring stretch without issue. [] No change. [] Other:    [x] Patient would continue to benefit from skilled physical therapy services in order to: improve LE strength, begin to grossly work functional tolerance and work on ROM once appropriate. Problems:    [x]? ? Back Pain: 3/10 and into (L) LE.                    [x]? ? ROM:  Limited grossly, not tested                  [x]? ?  Strength:            Limited with (L) LE strength testing  STG: (to be met in 6 treatments)  1. ? Pain: Improved pain 1/10 at worst  2. ? ROM: hold until appropriate  3. ? Strength: 4+/5 with long sitting testing  4. ? Function: hold goals until more appropriate  5. Independent with Home Exercise Programs  LTG:  (to be met in 12 treatments)  6. ? Pain: Improved pain 0/10 at worst  7. ? ROM: flexion to ankles, extension 10 degrees  8. ? Strength: 4+/5 with long sitting testing  9. ? Function: Able to lift/carry up to 30# once appropriate s/p surgery, able to ambulate as needed 25-30 minutes without increased back and LE pain, able to bend/get to work positions with minimal to no pain. 10. Independent with Home Exercise Program  Patient goals: no pain    Pt. Education:  [x] Yes  [] No  [] Reviewed Prior HEP/Ed  Method of Education: [x] Verbal  [] Demo  [] Written  Comprehension of Education:  [x] Verbalizes understanding. [] Demonstrates understanding. [] Needs review. [] Demonstrates/verbalizes HEP/Ed previously given. Plan: [x] Continue per plan of care.    [] Other:      Treatment Charges: Mins Units   []  Modalities     []  Ther Exercise     []  Manual Therapy     []  Ther Activities     []  Aquatics 33 2   []  Neuromuscular     [] Vasocompression     [] Gait Training     [] Dry needling        [] 1 or 2 muscles        [] 3 or more muscles     []  Other     Total Treatment time 33 2     Time In: 320 pm            Time Out:  353 pm    Electronically signed by:  Rosy Ramirez PTA

## 2021-05-24 ENCOUNTER — HOSPITAL ENCOUNTER (OUTPATIENT)
Dept: PHYSICAL THERAPY | Age: 61
Setting detail: THERAPIES SERIES
Discharge: HOME | End: 2021-05-24
Payer: COMMERCIAL

## 2021-05-24 PROCEDURE — 97113 AQUATIC THERAPY/EXERCISES: CPT

## 2021-05-24 NOTE — PROGRESS NOTES
[address] [facility]   [address] [place] [address] [address]   Phone: [phone]   Fax: [phone]    Physical Therapy Daily Treatment Note      Date:  2021  Patient Name:  Josephine Alvarado    :  1960  MRN: [id number]  Physician: Stephanie Mc CNP    ( Dr Colby Salas)                         Insurance: [facility] secondary  Medical Diagnosis: M47.26 radicular lumbar symptoms, s/p lumbar fusion Z98.1            Rehab Codes: low back pain M54.5  Onset Date: surgery 4/15/21                  Next 's appt. : 21 (8weeks 6/10)  Visit Count:                            Cancel/No Show: 0/0    Subjective:    Patient continues to note radicular symptoms remain the same and burning sensation continues when sleeping for more than 4 hours, patient states he attempted all positions suggested by therapist but no releif. Pain:  [x] Yes  [] No Location: LBP- Denies Buttock down L LE to half of foot;  Denies R LE symptoms Pain Rating: (0-10 scale) 3/10  Pain altered Tx:  [x] No  [] Yes  Action:  Comments: Emphasis on core stability and postural awareness throughout program    Objective:      [address] [facility] J Exercise Log  Aquatic, Hip & DLS Program- Phase 1    Date of Eval:  5/10/21                              Primary PT: Niurka Santos   Diagnosis:M47.26 radicular lumbar symptoms, s/p lumbar fusion Z98.1    Things to Focus On (goals):   Surgical Precautions: no twisting,lifting,bending - NO DEEP WATER HANGING  Medical Precautions:  [] C-9 dates  [] Occ Med   [] Medicare       Date 21    Visit # 2/12 3/12 4/12 5/12    Walk F/L/R 2 laps ea at rail 2 laps ea at rail 2 Laps @ Rail 2 laps @ rail    Marching 10x 2 laps 2 Laps 2 Laps    Squats  10x5\" 10x5\" 10x5\"    Step-Ups F/L  Low box  10x   10x Low 10x Low 10x    Step Down F/L        Heel-toe raises 10x 10x 10x 10x    SLR F/L/R 10x 10x 10x 10x    Hip/Knee Flex/Ext   10x 10x    F/L Lunges Kickboard Ex. Med Med    Iso Abd. 10x5\"  10x5\"  10x5\" 10x5\"    Push-pull 10x 10x 10x 10x    Paddling                UE Format:        Horiz Abd/Add    10x    IR/ER (wipers)    10x    Alt Flex/Ext    10x    Alt Press Down    10x    Abd/Add    10x                    Balance        SLS        Tandem  15\"x4 15\"x4 NT 15\"x4    Stretches        Achllies  2x20\" 2x20\" 2x20\"    Hamstring   2x20\" 2x20\"            Cool Down 2 laps at rail 2 laps at rail 2 Laps  2 Laps     Pain Rating 6/7 3 3 3        Specific Instructions for next treatment: Add UE format to challenge core in chest deep water      Assessment:     [x] Progressing toward goals. Continued with all LE exercises and able to add UE format with good tolerance, minimal cueing required for postural awareness this date. Patient unable to tolerate chest deep water due to water fear able to complete exercises with water line right below chest.     [] No change. [] Other:    [x] Patient would continue to benefit from skilled physical therapy services in order to: improve LE strength, begin to grossly work functional tolerance and work on ROM once appropriate. Problems:    [x]? ? Back Pain: 3/10 and into (L) LE.                    [x]? ? ROM:  Limited grossly, not tested                  [x]? ? Strength:            Limited with (L) LE strength testing  STG: (to be met in 6 treatments)  1. ? Pain: Improved pain 1/10 at worst  2. ? ROM: hold until appropriate  3. ? Strength: 4+/5 with long sitting testing  4. ? Function: hold goals until more appropriate  5.  Independent with Home Exercise Programs  LTG:  (to be met in 12 treatments)  6. ? Pain: Improved pain 0/10 at worst  7. ? ROM: flexion to ankles, extension 10 degrees  8. ? Strength: 4+/5 with long sitting testing  9. ? Function: Able to lift/carry up to 30# once appropriate s/p surgery, able to ambulate as needed 25-30 minutes without increased back and LE pain, able to bend/get to work positions with minimal to

## 2021-05-27 ENCOUNTER — HOSPITAL ENCOUNTER (OUTPATIENT)
Dept: PHYSICAL THERAPY | Age: 61
Setting detail: THERAPIES SERIES
Discharge: HOME | End: 2021-05-27
Payer: COMMERCIAL

## 2021-05-27 PROCEDURE — 97113 AQUATIC THERAPY/EXERCISES: CPT

## 2021-05-27 NOTE — PROGRESS NOTES
509 [facility] Outpatient Physical Therapy   [address] [place] [address] [address]   Phone: [phone]   Fax: [phone]    Physical Therapy Daily Treatment Note      Date:  2021  Patient Name:  Attila Hernandez    :  1960  MRN: [id number]  Physician: Liu Quiñones CNP    ( Dr Deep Padgett)                         Insurance: [facility] secondary  Medical Diagnosis: M47.26 radicular lumbar symptoms, s/p lumbar fusion Z98.1            Rehab Codes: low back pain M54.5  Onset Date: surgery 4/15/21                  Next 's appt. : 21 (8weeks 6/10)  Visit Count:                            Cancel/No Show: 0/0    Subjective:    Patient reports that he noticed a slight improvement of symptoms, is starting to feel toe 3-4 and was able to sleep for about 4.5hours last night before having burning sensation, notes radicular symptoms don't seem as intense. Pain:  [x] Yes  [] No Location: LBP- Denies Buttock down L LE to half of foot;  Denies R LE symptoms Pain Rating: (0-10 scale) 2/10  Pain altered Tx:  [x] No  [] Yes  Action:  Comments: Emphasis on core stability and postural awareness throughout program    Objective:      [facility] Exercise Log  Aquatic, Hip & DLS Program- Phase 1    Date of Eval:  5/10/21                              Primary PT: Winsome Silva   Diagnosis:M47.26 radicular lumbar symptoms, s/p lumbar fusion Z98.1    Things to Focus On (goals):   Surgical Precautions: no twisting,lifting,bending - NO DEEP WATER HANGING  Medical Precautions:  [] C-9 dates  [] Occ Med   [] Medicare       Date 21   Visit # 3/12 4/12 5/12 6/12   Walk F/L/R 2 laps ea at rail 2 Laps @ Rail 2 laps @ rail 2 laps    Marching 2 laps 2 Laps 2 Laps 2 laps    Squats 10x5\" 10x5\" 10x5\" 10x5\"   Step-Ups F/L  Low box  10x Low 10x Low 10x Low 10x   Step Down F/L       Heel-toe raises 10x 10x 10x 10x   SLR F/L/R 10x 10x 10x 10x   Hip/Knee Flex/Ext  10x 10x 10x   F/L Lunges Kickboard Ex. Med Med Med   Iso Abd. 10x5\"  10x5\" 10x5\" 10x5\"   Push-pull 10x 10x 10x 10x   Paddling              UE Format:       Horiz Abd/Add   10x 10x   IR/ER (wipers)   10x 10x   Alt Flex/Ext   10x 10x   Alt Press Down   10x 10x   Abd/Add   10x 10x                 Balance       SLS       Tandem  15\"x4 NT 15\"x4 NT   Stretches       Achllies 2x20\" 2x20\" 2x20\" 2x20\"   Hamstring  2x20\" 2x20\" 2x20\"   Neck deep water unloading     5' with 1 noodle   Cool Down 2 laps at rail 2 Laps  2 Laps  2 Laps    Pain Rating 3 3 3 2       Specific Instructions for next treatment: Add UE format to challenge core in chest deep water      Assessment:     [x] Progressing toward goals. Continued with all exercises and once again attempted unloading in neck deep water, patient did note he felt more relaxed and less burning sensation in thigh  but radicular symptoms remained noting big toe still felt numb. [] No change. [] Other:    [x] Patient would continue to benefit from skilled physical therapy services in order to: improve LE strength, begin to grossly work functional tolerance and work on ROM once appropriate. Problems:    [x]? ? Back Pain: 3/10 and into (L) LE.                    [x]? ? ROM:  Limited grossly, not tested                  [x]? ? Strength:            Limited with (L) LE strength testing  STG: (to be met in 6 treatments)  1. ? Pain: Improved pain 1/10 at worst  2. ? ROM: hold until appropriate  3. ? Strength: 4+/5 with long sitting testing  4. ? Function: hold goals until more appropriate  5.  Independent with Home Exercise Programs  LTG:  (to be met in 12 treatments)  6. ? Pain: Improved pain 0/10 at worst  7. ? ROM: flexion to ankles, extension 10 degrees  8. ? Strength: 4+/5 with long sitting testing  9. ? Function: Able to lift/carry up to 30# once appropriate s/p surgery, able to ambulate as needed 25-30 minutes without increased back and LE pain, able to bend/get to work positions with minimal to no pain. 10. Independent with Home Exercise Program  Patient goals: no pain    Pt. Education:  [x] Yes  [] No  [] Reviewed Prior HEP/Ed  Method of Education: [x] Verbal  [] Demo  [] Written  Comprehension of Education:  [x] Verbalizes understanding. [] Demonstrates understanding. [] Needs review. [] Demonstrates/verbalizes HEP/Ed previously given. Plan: [x] Continue per plan of care.    [] Other:      Treatment Charges: Mins Units   []  Modalities     []  Ther Exercise     []  Manual Therapy     []  Ther Activities     []  Aquatics 43 3   []  Neuromuscular     [] Vasocompression     [] Gait Training     [] Dry needling        [] 1 or 2 muscles        [] 3 or more muscles     []  Other     Total Treatment time 43 3     Time In: 520 pm            Time Out:  603 pm    Electronically signed by:  Steven Bahena PTA

## 2021-06-01 ENCOUNTER — HOSPITAL ENCOUNTER (OUTPATIENT)
Dept: PHYSICAL THERAPY | Age: 61
Setting detail: THERAPIES SERIES
Discharge: HOME | End: 2021-06-01
Payer: COMMERCIAL

## 2021-06-01 PROCEDURE — 97113 AQUATIC THERAPY/EXERCISES: CPT

## 2021-06-01 NOTE — PROGRESS NOTES
[facility] Outpatient Physical Therapy   [address]  [facility] [address]   Phone: [phone]   Fax: [phone]    Physical Therapy Daily Treatment Note      Date:  2021  Patient Name:  Regina Parham    :  1960  MRN: [id number]  Physician: Booker Ramirez CNP    ( Dr Cindy Arambula)                         Insurance: [facility] secondary  Medical Diagnosis: M47.26 radicular lumbar symptoms, s/p lumbar fusion Z98.1            Rehab Codes: low back pain M54.5  Onset Date: surgery 4/15/21                  Next 's appt. : 21 (8weeks 6/10)  Visit Count:                            Cancel/No Show: 0/0    Subjective:    Patient reports there has been no change in symptoms since his last reports of improvement, patient reports he is hopefully he will obtain full sensation in foot/toes. Pain:  [x] Yes  [] No Location: LBP- Denies Buttock down L LE to half of foot;  Denies R LE symptoms Pain Rating: (0-10 scale) 10  Pain altered Tx:  [x] No  [] Yes  Action:  Comments: Emphasis on core stability and postural awareness throughout program    Objective:      [address] [facility] Exercise Log  Aquatic, Hip & DLS Program- Phase 1    Date of Eval:  5/10/21                              Primary PT: Leah Stagers   Diagnosis:M47.26 radicular lumbar symptoms, s/p lumbar fusion Z98.1    Things to Focus On (goals):   Surgical Precautions: no twisting,lifting,bending - NO DEEP WATER HANGING  Medical Precautions:  [] C-9 dates  [] Occ Med   [] Medicare       Date 21   Visit # 3/12 4/12 5/12 6/12 7/12   Walk F/L/R 2 laps ea at rail 2 Laps @ Rail 2 laps @ rail 2 laps  2 laps    Marching 2 laps 2 Laps 2 Laps 2 laps  2 laps    Squats 10x5\" 10x5\" 10x5\" 10x5\" 10x5\"   Step-Ups F/L  Low box  10x Low 10x Low 10x Low 10x Low 10x   Step Down F/L        Heel-toe raises 10x 10x 10x 10x 10x   SLR F/L/R 10x 10x 10x 10x 10x   Hip/Knee Flex/Ext  10x 10x 10x 10x   F/L Lunges Kickboard Ex. Med Med Med Med   Iso Abd. 10x5\"  10x5\" 10x5\" 10x5\" 10x5\"   Push-pull 10x 10x 10x 10x 10x   Paddling                UE Format:        Horiz Abd/Add   10x 10x 10x   IR/ER (wipers)   10x 10x 10x   Alt Flex/Ext   10x 10x 10x   Alt Press Down   10x 10x 10x   Abd/Add   10x 10x 10x                   Balance        SLS        Tandem  15\"x4 NT 15\"x4 NT    Stretches        Achllies 2x20\" 2x20\" 2x20\" 2x20\" 2x20\"   Hamstring  2x20\" 2x20\" 2x20\" 2x20\"   Neck deep water unloading     5' with 1 noodle 5' with 1 noodle   Cool Down 2 laps at rail 2 Laps  2 Laps  2 Laps  2 Laps    Pain Rating 3 3 3 2 2       Specific Instructions for next treatment:       Assessment:     [x] Progressing toward goals. Continued with all exercises with minimal amount fo cueing for posture. Patient remains slightly fearful of neck deep water, only able to complete UE exercises in 4ft water. [] No change. [] Other:    [x] Patient would continue to benefit from skilled physical therapy services in order to: improve LE strength, begin to grossly work functional tolerance and work on ROM once appropriate. Problems:    [x]? ? Back Pain: 3/10 and into (L) LE.                    [x]? ? ROM:  Limited grossly, not tested                  [x]? ? Strength:            Limited with (L) LE strength testing  STG: (to be met in 6 treatments)  1. ? Pain: Improved pain 1/10 at worst  2. ? ROM: hold until appropriate  3. ? Strength: 4+/5 with long sitting testing  4. ? Function: hold goals until more appropriate  5.  Independent with Home Exercise Programs  LTG:  (to be met in 12 treatments)  6. ? Pain: Improved pain 0/10 at worst  7. ? ROM: flexion to ankles, extension 10 degrees  8. ? Strength: 4+/5 with long sitting testing  9. ? Function: Able to lift/carry up to 30# once appropriate s/p surgery, able to ambulate as needed 25-30 minutes without increased back and LE pain, able to bend/get to work positions with minimal to no pain.  10. Independent with Home Exercise Program  Patient goals: no pain    Pt. Education:  [x] Yes  [] No  [] Reviewed Prior HEP/Ed  Method of Education: [x] Verbal  [] Demo  [] Written  Comprehension of Education:  [x] Verbalizes understanding. [] Demonstrates understanding. [] Needs review. [] Demonstrates/verbalizes HEP/Ed previously given. Plan: [x] Continue per plan of care.    [] Other:      Treatment Charges: Mins Units   []  Modalities     []  Ther Exercise     []  Manual Therapy     []  Ther Activities     []  Aquatics 39 3   []  Neuromuscular     [] Vasocompression     [] Gait Training     [] Dry needling        [] 1 or 2 muscles        [] 3 or more muscles     []  Other     Total Treatment time 39 3     Time In: 145 pm            Time Out:  224 pm    Electronically signed by:  Julienne Gann PTA

## 2021-06-03 ENCOUNTER — HOSPITAL ENCOUNTER (OUTPATIENT)
Dept: PHYSICAL THERAPY | Age: 61
Setting detail: THERAPIES SERIES
Discharge: HOME | End: 2021-06-03
Payer: COMMERCIAL

## 2021-06-03 PROCEDURE — 97113 AQUATIC THERAPY/EXERCISES: CPT

## 2021-06-03 NOTE — PROGRESS NOTES
Flex/Ext 10x 10x 10x 10x 10x   F/L Lunges                Kickboard Ex. Med Med Med Med Med   Iso Abd. 10x5\" 10x5\" 10x5\" 10x5\" 10x5\"   Push-pull 10x 10x 10x 10x 10x   Paddling                UE Format:        Horiz Abd/Add  10x 10x 10x 10x   IR/ER (wipers)  10x 10x 10x 10x   Alt Flex/Ext  10x 10x 10x 10x   Alt Press Down  10x 10x 10x 10x   Abd/Add  10x 10x 10x 10x                   Balance        SLS        Tandem  NT 15\"x4 NT     Stretches        Achllies 2x20\" 2x20\" 2x20\" 2x20\" 2x20\"   Hamstring 2x20\" 2x20\" 2x20\" 2x20\" 2x20\"   Neck deep water unloading    5' with 1 noodle 5' with 1 noodle 5' with 1 noodle   Cool Down 2 Laps  2 Laps  2 Laps  2 Laps  2 Laps    Pain Rating 3 3 2 2 3       Specific Instructions for next treatment:       Assessment:     [x] Progressing toward goals. Patient able to complete all exercises with minimal cueing required for technique, patient did not relief of pain post hanging and exercises this date. [] No change. [] Other:    [x] Patient would continue to benefit from skilled physical therapy services in order to: improve LE strength, begin to grossly work functional tolerance and work on ROM once appropriate. Problems:    [x]? ? Back Pain: 3/10 and into (L) LE.                    [x]? ? ROM:  Limited grossly, not tested                  [x]? ? Strength:            Limited with (L) LE strength testing  STG: (to be met in 6 treatments)  1. ? Pain: Improved pain 1/10 at worst  2. ? ROM: hold until appropriate  3. ? Strength: 4+/5 with long sitting testing  4. ? Function: hold goals until more appropriate  5.  Independent with Home Exercise Programs  LTG:  (to be met in 12 treatments)  6. ? Pain: Improved pain 0/10 at worst  7. ? ROM: flexion to ankles, extension 10 degrees  8. ? Strength: 4+/5 with long sitting testing  9. ? Function: Able to lift/carry up to 30# once appropriate s/p surgery, able to ambulate as needed 25-30 minutes without increased back and LE pain, able to bend/get to work positions with minimal to no pain. 10. Independent with Home Exercise Program  Patient goals: no pain    Pt. Education:  [x] Yes  [] No  [] Reviewed Prior HEP/Ed  Method of Education: [x] Verbal  [] Demo  [] Written  Comprehension of Education:  [x] Verbalizes understanding. [] Demonstrates understanding. [] Needs review. [] Demonstrates/verbalizes HEP/Ed previously given. Plan: [x] Continue per plan of care.    [] Other:      Treatment Charges: Mins Units   []  Modalities     []  Ther Exercise     []  Manual Therapy     []  Ther Activities     []  Aquatics 40 3   []  Neuromuscular     [] Vasocompression     [] Gait Training     [] Dry needling        [] 1 or 2 muscles        [] 3 or more muscles     []  Other     Total Treatment time 40 3     Time In: 420 pm            Time Out:  500 pm    Electronically signed by:  Syed Tsang PTA

## 2021-06-05 ENCOUNTER — HOSPITAL ENCOUNTER (OUTPATIENT)
Dept: GENERAL RADIOLOGY | Age: 61
Discharge: HOME | End: 2021-06-07
Payer: COMMERCIAL

## 2021-06-05 ENCOUNTER — HOSPITAL ENCOUNTER (OUTPATIENT)
Age: 61
Discharge: HOME | End: 2021-06-07
Payer: COMMERCIAL

## 2021-06-05 DIAGNOSIS — M47.26 OTHER SPONDYLOSIS WITH RADICULOPATHY, LUMBAR REGION: ICD-10-CM

## 2021-06-05 DIAGNOSIS — Z98.1 S/P LUMBAR FUSION: ICD-10-CM

## 2021-06-05 PROCEDURE — 72100 X-RAY EXAM L-S SPINE 2/3 VWS: CPT

## 2021-06-07 ENCOUNTER — HOSPITAL ENCOUNTER (OUTPATIENT)
Dept: PHYSICAL THERAPY | Age: 61
Setting detail: THERAPIES SERIES
Discharge: HOME | End: 2021-06-07
Payer: COMMERCIAL

## 2021-06-07 PROCEDURE — 97113 AQUATIC THERAPY/EXERCISES: CPT

## 2021-06-07 NOTE — PROGRESS NOTES
509 [facility] Outpatient Physical Therapy   [address] [facility] [address]   Phone: [phone]   Fax: [phone]    Physical Therapy Daily Treatment Note      Date:  2021  Patient Name:  Hattie Aguilera    :  1960  MRN: [id number]  Physician: Vasyl Mitchell CNP    ( Dr Kapil Hannah)                         Insurance: [facility] secondary  Medical Diagnosis: M47.26 radicular lumbar symptoms, s/p lumbar fusion Z98.1            Rehab Codes: low back pain M54.5  Onset Date: surgery 4/15/21                  Next 's appt. : 21 (8weeks 6/10)  Visit Count:                            Cancel/No Show: 0/0    Subjective:    Sleeping better overall- able to average 7 hrs a night. Also is able to drive now. However continues with left foot numbness. States symptoms are unchanged since surgery but also reports he did not have left LE symptoms prior to surgery- has been taking lyrica 2x daily since surgery. 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 Pain:  [x] Yes  [] No Location: LBP- Denies; Buttock down L LE to half of foot;  Denies R LE symptoms Pain Rating: (0-10 scale) 3/10  Pain altered Tx:  [x] No  [] Yes  Action:  Comments:     Objective:      [address] [facility] Exercise Log  Aquatic, Hip & DLS Program- Phase 1    Date of Eval:  5/10/21                              Primary PT: Dorjustin Ped   Diagnosis:M47.26 radicular lumbar symptoms, s/p lumbar fusion Z98.1    Things to Focus On (goals):   Surgical Precautions: no twisting,lifting,bending - NO DEEP WATER HANGING  Medical Precautions:  [] C-9 dates  [] Occ Med   [] Medicare     Date 6/1/21 6/3/21 6/7/21    Visit #     Walk F/L/R 2 laps  2 laps  2 Laps    Marching 2 laps  2 laps  2 Laps       Low Box    Squats 10x5\" 10x5\" 10x5\"    Step-Ups F/L  Low box  Low 10x Low 10x Low 10x    Step Down F/L       Heel-toe raises 10x 10x 10x    SLR F/L/R 10x 10x 10x    Hip/Knee Flex/Ext 10x 10x 10x    F/L Lungsugar              Kickboard Ex. Med Med Large    Iso Abd. 10x5\" 10x5\" 10x5\"    Push-pull 10x 10x 10x    Paddling              UE Format:       Horiz Abd/Add 10x 10x 10x    IR/ER (wipers) 10x 10x 10x    Alt Flex/Ext 10x 10x 10x    Alt Press Down 10x 10x 10x    Abd/Add 10x 10x 10x                  Balance       SLS       Tandem        Stretches       Achllies 2x20\" 2x20\" 2x20\"    Hamstring 2x20\" 2x20\" 2x20\"    Neck deep water unloading (toe touch) 5' with 1 noodle 5' with 1 noodle 1 Noodle 5'    Cool Down 2 Laps  2 Laps  Breast Stroke  2 Laps    Pain Rating 2 3 3      Specific Instructions for next treatment: Increase speed with UE format; follow up on Dr appt    Assessment:     [x] Progressing toward goals. Increased WB with LE exercise without issue; also increased resistance with kick board. Patient progressing well overall with exercise however L LE symptoms remain unchanged    [] No change. [] Other:    [x] Patient would continue to benefit from skilled physical therapy services in order to: improve LE strength, begin to grossly work functional tolerance and work on ROM once appropriate. Problems:    [x] ? Back Pain: 3/10 and into (L) LE.                    [x] ? ROM:  Limited grossly, not tested                  [x] ? Strength:            Limited with (L) LE strength testing    STG: (to be met in 6 treatments)  ? Pain: Improved pain 1/10 at worst  ? ROM: hold until appropriate  ? Strength: 4+/5 with long sitting testing  ? Function: hold goals until more appropriate  Independent with Home Exercise Programs  LTG:  (to be met in 12 treatments)  ? Pain: Improved pain 0/10 at worst  ? ROM: flexion to ankles, extension 10 degrees  ? Strength: 4+/5 with long sitting testing  ? Function: Able to lift/carry up to 30# once appropriate s/p surgery, able to ambulate as needed 25-30 minutes without increased back and LE pain, able to bend/get to work positions with minimal to no pain.   Independent with Home Exercise Program  Patient goals: no pain    Pt. Education:  [x] Yes  [] No  [] Reviewed Prior HEP/Ed  Method of Education: [x] Verbal  [] Demo  [] Written  Comprehension of Education:  [x] Verbalizes understanding. [] Demonstrates understanding. [] Needs review. [] Demonstrates/verbalizes HEP/Ed previously given. Plan: [] Continue per plan of care. [x] Other: PT re-assess this date   Physical therapy treatment completed today in part by physical therapist assistant (PTA). Treatment times reflect total treatment time combined by both PT and PTA for today's service.     Treatment Charges: Mins Units   []  Modalities     []  Ther Exercise     []  Manual Therapy     []  Ther Activities     []  Aquatics 34 2   []  Neuromuscular     [] Vasocompression     [] Gait Training     [] Dry needling        [] 1 or 2 muscles        [] 3 or more muscles     []  Other     Total Treatment time 34 2   15 minutes unbilled time with PT Ronak Mcgraw for progress note, treatment by PTA [facility]  Time In: 521 pm            Time Out:  615PM    Electronically signed by:  Brayan Rebolledo PTA    Electronically signed by Ronak Mcgraw PT on 6/9/2021 at 8:43 AM

## 2021-06-09 ENCOUNTER — OFFICE VISIT (OUTPATIENT)
Dept: NEUROSURGERY | Age: 61
End: 2021-06-09

## 2021-06-09 VITALS — RESPIRATION RATE: 16 BRPM | SYSTOLIC BLOOD PRESSURE: 143 MMHG | DIASTOLIC BLOOD PRESSURE: 85 MMHG | HEART RATE: 75 BPM

## 2021-06-09 DIAGNOSIS — M54.16 LUMBAR RADICULOPATHY: Primary | ICD-10-CM

## 2021-06-09 PROCEDURE — 99024 POSTOP FOLLOW-UP VISIT: CPT | Performed by: NEUROLOGICAL SURGERY

## 2021-06-09 NOTE — PROGRESS NOTES
Musa Markham  MOB # 2 [address]  [id number]  Dept: [phone]    Patient:  Julio Potts  YOB: 1960  Date: 6/9/21    The patient is a 63 y.o. male who presents today for consult of the following problems:     Chief Complaint   Patient presents with    Post-Op Check             HPI:     Julio Potts is a 63 y.o. male on whom neurosurgical consultation was requested by PAUL Desouza CNP for management of lumbar spondylosis with radiculopathy. Resolution of bilateral lower extremity numbness tingling and neuropathic symptoms. He does have L5 dermatomal numbness mainly in his big toe in the webspace as well as the second digit of the left foot. Does have some EHL weakness. Overall no inhibition in terms of gait ability and ambulation however. No saddle anesthesia or bowel bladder dysfunction. Has been doing regular aquatic therapy and has been using the brace but not at night. .      History:     Past Medical History:   Diagnosis Date    Chronic back pain     COVID-19 04/02/2021    COVID-19 04/12/2021    Pt tested positive for covid when he went to scheduled appt before surgery on 4/3/2021 @ [facility], no symptoms    Headache(784.0)     Kidney stones     Osteoarthritis     Snores     denies apnea    Wellness examination 03/23/2021    PCP: Joyce Castrejon, last visit July 2020    Wellness examination 03/23/2021    Neurosurgeon: [facility], last visit March 2021    Wellness examination 03/23/2021    Urology: [facility], pt unsure of doctor, last visit yr ago     Past Surgical History:   Procedure Laterality Date    ANESTHESIA NERVE BLOCK N/A 08/14/2020    NERVE BLOCK - MBB # 1 L3-4, L4-5 performed by Shahla Carlton MD at [address] [facility]      Had 3    COLONOSCOPY  2012    LITHOTRIPSY  08/2018    LUMBAR FUSION  04/15/2021    L3-L5  POSTERIOR LUMBAR FUSION    LUMBAR FUSION N/A 4/15/2021    L3-L5  POSTERIOR LUMBAR FUSION performed by John Solis DO at [address] [facility] [address] [facility] [address]      laminectomy X2 and Spinal Cord stimulartor implant    NERVE BLOCK Bilateral 08/21/2020    NERVE BLOCK - MBB #2 L3-4 L4-5 performed by Anna Pastrana MD at [address] [place] [address] Right 09/04/2020    NERVE RADIOFREQUENCY ABLATION - L3-4, L4-5 performed by Anna Pastrana MD at [address] [place] [address] Left 09/11/2020    NERVE RADIOFREQUENCY ABLATION L3-4, L4-5 performed by Anna Pastrana MD at [address] [facility]  01/30/2020    removal    SPINAL CORD STIMULATOR SURGERY N/A 01/30/2020    SPINAL CORD STIMULATOR IMPLANT REMOVAL, performed by John Solis DO at [address] [place] [address]       Family History   Problem Relation Age of Onset    Diabetes Mother     High Blood Pressure Mother     Diabetes Father     Glaucoma Father     Eczema Father      Current Outpatient Medications on File Prior to Visit   Medication Sig Dispense Refill    HYDROcodone-acetaminophen (NORCO) 5-325 MG per tablet Take 1 tablet by mouth every 6 hours as needed for Pain for up to 30 days. 30 tablet 0    ibuprofen (ADVIL;MOTRIN) 800 MG tablet Take 1 tablet by mouth every 6 hours as needed for Pain 360 tablet 0    Handicap Placard [facility] It is my medical opinion that Shawna Kay requires a disability parking placard for the following reasons:  He has limited walking ability due to an arthritic condition. Duration of need: permanent 1 each 0    ondansetron (ZOFRAN) 4 MG tablet TAKE ONE TABLET BY MOUTH EVERY 8 HOURS AS NEEDED FOR NAUSEA 90 tablet 0    pregabalin (LYRICA) 150 MG capsule Take 1 capsule by mouth 2 times daily for 30 days. (Patient taking differently: Take 150 mg by mouth daily. ) 60 capsule 3     No current facility-administered medications on file prior to visit.      Social History     Tobacco Use    shrug symmetric  Tongue midline  No dysarthria  v1-3 sensation symmetric, masseter tone symmetric  Hearing symmetric and intact to finger rub    Sensation:   Diminished left L5    Motor  L deltoid 5/5; R deltoid 5/5  L biceps 5/5; R biceps 5/5  L triceps 5/5; R triceps 5/5  L wrist extension 5/5; R wrist extension 5/5  L intrinsics 5/5; R intrinsics 5/5     L iliopsoas 5/5 , R iliopsoas 5/5  L quadriceps 5/5; R quadriceps 5/5  L Dorsiflexion 5/5; R dorsiflexion 5/5  L Plantarflexion 5/5; R plantarflexion 5/5  L EHL 1/5; R EHL 5/5    Reflexes  L Brachioradialis 2+/4; R brachioradialis 2+/4  L Biceps 2+/4; R Biceps 2+/4  L Triceps 2+/4; R Triceps 2+/4  L Patellar 2+/4: R Patellar 2+/4  L Achilles 2+/4; R Achilles 2+/4    hoffmans L: neg  hoffmans R: neg  Clonus L: neg  Clonus R: neg  Babinski L: up  Babinski R; up    Studies Review:     Upright AP and lateral x-rays show good alignment of the lumbar spine with intact hardware including cage. Assessment and Plan:      1. Lumbar radiculopathy          Plan:     Tenderness resolution of lower extremity clot and axial symptoms significant improved. Some left L5 radiculopathy including EHL weakness along with L5 dermatomal numbness. Counseled the overall duration of neuropathic improvement can be upwards of 6 months and the localized L5 symptoms I do not think are disabling in terms of his gait ability. We will escalate physical therapy to core strengthening gentle range of motion and wean the brace off over the next ensuing 2 weeks. Follow-up 2 months with repeat x-rays. He would like to return to work as a  at this time and per the restrictions as of current he will work 1 week on 1 week off and does have fairly limited amount of strain involved in terms of labor and predominately involves ambulation and touring.   We will let him return to work preferably part-time for 1 week to see how he does and then graduate to full-time if he is able to tolerate. Followup: No follow-ups on file. Prescriptions Ordered:  No orders of the defined types were placed in this encounter. Orders Placed:  No orders of the defined types were placed in this encounter. Electronically signed by Kevon Gant DO on 6/9/2021 at 2:25 PM    Please note that this chart was generated using voice recognition Dragon dictation software. Although every effort was made to ensure the accuracy of this automated transcription, some errors in transcription may have occurred.

## 2021-06-09 NOTE — LETTER
[facility] [address]  Phone: [phone]  Fax: [phone]    Juani Lanier DO        June 9, 2021     Patient: Alexander Mejia   YOB: 1960   Date of Visit: 6/9/2021       To Whom It May Concern: It is my medical opinion that Judi Browne may return to work on 6/21/21. Maru recommended that he return to work part time for 1wk followed by return to work full time afterward if he is able to tolerate the initial 1wk period. If you have any questions or concerns, please don't hesitate to call.     Sincerely,        Juani Lanier DO

## 2021-06-09 NOTE — PROGRESS NOTES
[address] [facility] Outpatient Physical Therapy  [address] [place] [address]. [address]         Phone: [phone]       Fax: [phone]    Physical Therapy Progress Note    Date: 2021      Patient: Selene Quiles  : 1960  MRN: [id number]    CBREDMXZC: BHHRO, [address] CNP    ( Dr Schultz Post secondary  Medical Diagnosis: M47.26 radicular lumbar symptoms, s/p lumbar fusion Z98.1            Rehab Codes: low back pain M54.5  Onset Date: surgery 4/15/21                  Next 's appt. : 21 (8weeks 6/10)  Visit Count:                            Cancel/No Show: 0/0  Date of initial visit: 5/10/21                Date of final visit: 21      Subjective:  Patient noting improved overall symptoms and pain levels in the back. Improved with sleeping and able to get up to 6-7 hours of sleep. Prior to surgical intervention, was getting numbness with prolonged standing and severe back pain, these symptoms are better since surgical intervention. Does report constant low level numbness in the (L) LE, (L) foot that was not present prior to surgical intervention. Discussed with patient possibility of temporary nerve compression due to post surgical swelling. Working on nerve glides and proper spinal decompression with laying to help this symptom. Still has residual weakness in the (L) LE with DF, PF, and knee flexion and extension testing, better overall hip testing. Patient to see physician on 21 for regularly scheduled surgical follow up- may benefit from additional therapy to continue to work on limitations with LE strength and start to work more on mobility and stabilization of lumbar spine.   Exercises have included just aquatic therapy, will await physician recommendations regarding any lifting/carrying restriction, progression to land based treatment, progression of ROM exercises as he is seeing physician on Wednesday and is currently approximately 8 weeks post surgical intervention at that time. Pain:  [x]? Yes  []? No           Location: LBP- Denies; Buttock down L LE to half of foot; Denies R LE symptoms        Pain Rating: (0-10 scale) 3/10  Pain altered Tx:  [x]? No  []? Yes  Action:  Comments:     Objective:  Lumbar ROM:  Held testing     LE strength:  Hip flexion, abduction, 4+/5 (B) knee flexion, extension 4+/5 (R), (L) 4/5  DF, PF, great toe extension 4+/5 (R), (L) 4/5     TESTS (+/-) LEFT RIGHT Not Tested   SLR []? sit []? supine     []?    Hamstring (SLR)     []?    SKTC     []?    DKTC     []?    Slump/Dural (+) end range (-) []?    SI JT     []?    HERMES     []?    Joint Mobility     []?    Cerv. Comp     []?    Cerv. Distraction     []?    Cerv. Alar/Transverse     []?    Vertebral Artery     []?    Adsons     []?    Bev Rise     []?       OBSERVATION No Deficit Deficit Not Tested Comments   Posture           Forward Head []?  []?  []?      Rounded Shoulders []?  []?  []?      Kyphosis []?  []?  []?      Lordosis []?  []?  []?      Lateral Shift []?  []?  []?      Scoliosis []?  []?  []?      Iliac Crest []?  []?  []?      PSIS []?  []?  []?      ASIS []?  []?  []?      Genu Valgus []?  []?  []?      Genu Varus []?  []?  []?      Genu Recurvatum []?  []?  []?      Pronation []?  []?  []?      Supination []?  []?  []?      Leg Length Discrp []?  []?  []?      Slumped Sitting []?  []?  []?      Palpation []?  []?  []?  Well healed incision   Sensation []?  []?  []?      Edema []?  []?  []?      Neurological []?  []?  []?         Comments:    Assessment:  Improved pain levels, better sleeping tolerance, continued weakness in the (L) LE and have not progressed more strenuous exercises. Sees physician for follow up and recommendation regarding work/work restrictions and progression of therapy.   STG: (to be met in 6 treatments)  1. ? Pain: Improved pain 1/10 at worst PROGRESSING  2. ? ROM: hold until appropriate NA  3. ? Strength: 4+/5 with long sitting plan of care and certify a need for medically necessary rehabilitation services.      *PLEASE SIGN ABOVE AND FAX BACK ALL PAGES*

## 2021-06-10 ENCOUNTER — HOSPITAL ENCOUNTER (OUTPATIENT)
Dept: PHYSICAL THERAPY | Age: 61
Setting detail: THERAPIES SERIES
Discharge: HOME | End: 2021-06-10
Payer: COMMERCIAL

## 2021-06-10 ENCOUNTER — APPOINTMENT (OUTPATIENT)
Dept: PHYSICAL THERAPY | Age: 61
End: 2021-06-10
Payer: COMMERCIAL

## 2021-06-10 PROCEDURE — 97113 AQUATIC THERAPY/EXERCISES: CPT

## 2021-06-15 ENCOUNTER — HOSPITAL ENCOUNTER (OUTPATIENT)
Dept: PHYSICAL THERAPY | Age: 61
Setting detail: THERAPIES SERIES
Discharge: HOME | End: 2021-06-15
Payer: COMMERCIAL

## 2021-06-15 PROCEDURE — 97110 THERAPEUTIC EXERCISES: CPT

## 2021-06-15 NOTE — FLOWSHEET NOTE
[address]   [address] [facility] [address]   Phone: [phone]   Fax: [phone]    Physical Therapy Daily Treatment Note      Date:  6/15/2021  Patient Name:  China Gongora    :  1960  MRN: [id number]  CKIYASJSK: ZQPQR, Sunitha CNP    ( Dr Roxana Brown secondary  Medical Diagnosis: M47.26 radicular lumbar symptoms, s/p lumbar fusion Z98.1            Rehab Codes: low back pain M54.5  Onset Date: surgery 4/15/21                  Next 's appt. : 21 (8weeks 6/10)  Visit Count:                           Cancel/No Show: 0/0    Subjective:   Pt states he has the most difficulty with putting on his socks. Pt states he is still lacking sensation in first three toes. Pain:  [x] Yes  [] No Location: Low back Pain Rating: (0-10 scale) 2/10  Pain altered Tx:  [] No  [] Yes  Action:  Comments:    Objective:  Modalities:   Precautions:  Exercises:  Exercise Reps/ Time Weight/ Level Completed  Today Comments   Supine       HS stretch 2x 20\"  x    Piriformis stretch 2x 20\"  x    SLR 10x2  x    marching 20x  x           SL       Clamshells 20x  x    Hip ABD 10x2  x           standing       3 way hip 10x  x    marching 20x  x    Mini squats 10x2  x    Heel toe raises 20x  x    Step ups 10x  x Fwd/lat   ABCs 1x 2# ball x           Other:     Specific Instructions for next treatment:      Assessment: [x] Progressing toward goals. [] No change. [] Other:    [x] Patient would continue to benefit from skilled physical therapy services in order to: improve LE strength, begin to grossly work functional tolerance and work on ROM once appropriate. 6/15/2021: Pt ed on TrA Activation to help inc trunk support throughout all land therapy exercises. Began tx with supine exercises and stretching to warm up pt and see what pt could tolerate. Progressed to standing exercises without inc in pain.   Pt performed charted exercises above without inc in pain. Continue to monitor symptoms and progress pt as tolerated. STG/LTG  STG: (to be met in 6 treatments)  1. ? Pain: Improved pain 1/10 at worst  2. ? ROM: hold until appropriate  3. ? Strength: 4+/5 with long sitting testing  4. ? Function: hold goals until more appropriate  5. Independent with Home Exercise Programs  LTG:  (to be met in 12 treatments)  6. ? Pain: Improved pain 0/10 at worst  7. ? ROM: flexion to ankles, extension 10 degrees  8. ? Strength: 4+/5 with long sitting testing  9. ? Function: Able to lift/carry up to 30# once appropriate s/p surgery, able to ambulate as needed 25-30 minutes without increased back and LE pain, able to bend/get to work positions with minimal to no pain. 10. Independent with Home Exercise Program  Patient goals: no pain    Pt. Education:  [x] Yes  [] No  [x] Reviewed Prior HEP/Ed  Method of Education: [x] Verbal  [] Demo  [] Written  Comprehension of Education:  [x] Verbalizes understanding. [] Demonstrates understanding. [] Needs review. [] Demonstrates/verbalizes HEP/Ed previously given. Plan: [x] Continue per plan of care.    [] Other:      Treatment Charges: Mins Units   []  Modalities     [x]  Ther Exercise 40 3   []  Manual Therapy     []  Ther Activities     []  Aquatics     []  Neuromuscular     [] Vasocompression     [] Gait Training     [] Dry needling        [] 1 or 2 muscles        [] 3 or more muscles     []  Other     Total Treatment time 40 3     Time In:0900          Time Out: 0940    Electronically signed by:  Shilpi Reyes PTA

## 2021-06-17 ENCOUNTER — APPOINTMENT (OUTPATIENT)
Dept: PHYSICAL THERAPY | Age: 61
End: 2021-06-17
Payer: COMMERCIAL

## 2021-06-22 ENCOUNTER — HOSPITAL ENCOUNTER (OUTPATIENT)
Dept: PHYSICAL THERAPY | Age: 61
Setting detail: THERAPIES SERIES
Discharge: HOME | End: 2021-06-22
Payer: COMMERCIAL

## 2021-06-22 PROCEDURE — 97110 THERAPEUTIC EXERCISES: CPT

## 2021-06-22 NOTE — FLOWSHEET NOTE
lying position then seated in order to protect lumbar. STG/LTG  STG: (to be met in 6 treatments)  1. ? Pain: Improved pain 1/10 at worst  2. ? ROM: hold until appropriate  3. ? Strength: 4+/5 with long sitting testing  4. ? Function: hold goals until more appropriate  5. Independent with Home Exercise Programs  LTG:  (to be met in 12 treatments)  6. ? Pain: Improved pain 0/10 at worst  7. ? ROM: flexion to ankles, extension 10 degrees  8. ? Strength: 4+/5 with long sitting testing  9. ? Function: Able to lift/carry up to 30# once appropriate s/p surgery, able to ambulate as needed 25-30 minutes without increased back and LE pain, able to bend/get to work positions with minimal to no pain. 10. Independent with Home Exercise Program  Patient goals: no pain    Pt. Education:  [x] Yes  [] No  [x] Reviewed Prior HEP/Ed  Method of Education: [x] Verbal  [] Demo  [] Written  Comprehension of Education:  [x] Verbalizes understanding. [] Demonstrates understanding. [] Needs review. [] Demonstrates/verbalizes HEP/Ed previously given. Plan: [x] Continue per plan of care.    [] Other:      Treatment Charges: Mins Units   []  Modalities     [x]  Ther Exercise 41 3   []  Manual Therapy     []  Ther Activities     []  Aquatics     []  Neuromuscular     [] Vasocompression     [] Gait Training     [] Dry needling        [] 1 or 2 muscles        [] 3 or more muscles     []  Other     Total Treatment time 41 3     Time In:1100am      Time Out: 1141am    Electronically signed by:  Jayson Schlatter, PTA

## 2021-06-24 ENCOUNTER — HOSPITAL ENCOUNTER (OUTPATIENT)
Dept: PHYSICAL THERAPY | Age: 61
Setting detail: THERAPIES SERIES
Discharge: HOME | End: 2021-06-24
Payer: COMMERCIAL

## 2021-06-24 PROCEDURE — 97110 THERAPEUTIC EXERCISES: CPT

## 2021-06-24 NOTE — FLOWSHEET NOTE
[address] [facility] Outpatient Physical Therapy   [address] [place] [address] [address]   Phone: [phone]   Fax: [phone]    Physical Therapy Daily Treatment Note      Date:  2021  Patient Name:  Hattie Aguilera    :  1960  MRN: [id number]  WQLJYWJEU: Sunitha POWELL CNP    ( Dr Lavinia Gross secondary  Medical Diagnosis: M47.26 radicular lumbar symptoms, s/p lumbar fusion Z98.1            Rehab Codes: low back pain M54.5  Onset Date: surgery 4/15/21                  Next 's appt. : 21 (8weeks 6/10)  Visit Count:                          Cancel/No Show: 0/0    Subjective:   Pt has no new complaints at this time. Pain:  [x] Yes  [] No Location: Low back Pain Rating: (0-10 scale) 2/10  Pain altered Tx:  [] No  [] Yes  Action:  Comments:    Objective:  Modalities:   Precautions:  Exercises:  Exercise Reps/ Time Weight/ Level Completed  Today Comments   Supine       HS stretch 2x 20\"  x    Piriformis stretch 2x 20\"  x    SLR 10x2  x    marching 20x  x    Bridge  10x2   3\" hold   X     SL       Clamshells 20x  x    Hip ABD 10x2  x           standing       3 way hip 10x2 Green  x    marching 20x  x    TG squats 10x2 lvl 10  x    Heel toe raises 20x  x    Step ups fwd/lat  10x 8'  x Fwd/lat   ABCs 1x 2# ball x    Weighted walks  4x 13# X  Fwd/retro only    Rows/pull downs  10x2 25# x    Other:     Specific Instructions for next treatment:      Assessment: [x] Progressing toward goals. [] No change. [] Other:    [x] Patient would continue to benefit from skilled physical therapy services in order to: improve LE strength, begin to grossly work functional tolerance and work on ROM once appropriate. 2021: continued with most recent exercises with addition of pull downs and rows for core strengthening. Patient did not note increased pain symptoms, throughout session numbness in toes 2-4.      STG/LTG  STG: (to be met in 6 treatments)  1. ? Pain: Improved pain 1/10 at worst  2. ? ROM: hold until appropriate  3. ? Strength: 4+/5 with long sitting testing  4. ? Function: hold goals until more appropriate  5. Independent with Home Exercise Programs  LTG:  (to be met in 12 treatments)  6. ? Pain: Improved pain 0/10 at worst  7. ? ROM: flexion to ankles, extension 10 degrees  8. ? Strength: 4+/5 with long sitting testing  9. ? Function: Able to lift/carry up to 30# once appropriate s/p surgery, able to ambulate as needed 25-30 minutes without increased back and LE pain, able to bend/get to work positions with minimal to no pain. 10. Independent with Home Exercise Program  Patient goals: no pain    Pt. Education:  [x] Yes  [] No  [x] Reviewed Prior HEP/Ed  Method of Education: [x] Verbal  [] Demo  [] Written  Comprehension of Education:  [x] Verbalizes understanding. [] Demonstrates understanding. [] Needs review. [] Demonstrates/verbalizes HEP/Ed previously given. Plan: [x] Continue per plan of care.    [] Other:      Treatment Charges: Mins Units   []  Modalities     [x]  Ther Exercise 43 3   []  Manual Therapy     []  Ther Activities     []  Aquatics     []  Neuromuscular     [] Vasocompression     [] Gait Training     [] Dry needling        [] 1 or 2 muscles        [] 3 or more muscles     []  Other     Total Treatment time 43 3     Time In:1100am      Time Out: 1143am    Electronically signed by:  Juan Jeffery PTA

## 2021-06-29 ENCOUNTER — HOSPITAL ENCOUNTER (OUTPATIENT)
Dept: PHYSICAL THERAPY | Age: 61
Setting detail: THERAPIES SERIES
Discharge: HOME | End: 2021-06-29
Payer: COMMERCIAL

## 2021-06-29 PROCEDURE — 97110 THERAPEUTIC EXERCISES: CPT

## 2021-06-29 NOTE — FLOWSHEET NOTE
[address]   [address] [facility] [address]   Phone: [phone]   Fax: [phone]    Physical Therapy Daily Treatment Note      Date:  2021  Patient Name:  Selene Quiles    :  1960  MRN: [id number]  YVTREYEON: Sunitha ARTHUR CNP    ( Dr Schultz Post secondary  Medical Diagnosis: M47.26 radicular lumbar symptoms, s/p lumbar fusion Z98.1            Rehab Codes: low back pain M54.5  Onset Date: surgery 4/15/21                  Next 's appt. : 21 (8weeks 6/10)  Visit Count:                          Cancel/No Show: 0/0    Subjective:   Pt has no new complaints at this time. Pt reports continued numbness in L foot with standing for long periods of time. Pain:  [x] Yes  [] No Location: Low back Pain Rating: (0-10 scale) 2/10  Pain altered Tx:  [] No  [] Yes  Action:  Comments:    Objective:  Modalities:   Precautions:  Exercises:  Exercise Reps/ Time Weight/ Level Completed  Today Comments   Seated Fwd flexion Stretch 3x 30\"  x           Supine       HS stretch 2x 20\"  x    Piriformis stretch 2x 20\"  x    SLR 10x2      marching 20x      Bridge  10x2   3\" hold       SL       Clamshells 20x      Hip ABD 10x2             standing       3 way hip: BATCA 10x2 15# x : changed to CC   marching 20x  x    squats 10x2  x : on floor   TG squats 10x2 lvl 10      Heel toe raises 20x      Step ups fwd/lat  10x 8'  x Fwd/lat   ABCs 1x 4# ball x : inc wt   Weighted walks  4x 13# x Fwd/retro/lat   Rows/pull downs  10x2 25# x    Other:     Specific Instructions for next treatment:      Assessment: [x] Progressing toward goals. [] No change. [] Other:    [x] Patient would continue to benefit from skilled physical therapy services in order to: improve LE strength, begin to grossly work functional tolerance and work on ROM once appropriate.     : Progressed pt to CC with 3 way hip and squats from TG to full standing

## 2021-07-01 ENCOUNTER — HOSPITAL ENCOUNTER (OUTPATIENT)
Dept: PHYSICAL THERAPY | Age: 61
Setting detail: THERAPIES SERIES
Discharge: HOME | End: 2021-07-01
Payer: COMMERCIAL

## 2021-07-01 PROCEDURE — 97110 THERAPEUTIC EXERCISES: CPT

## 2021-07-01 NOTE — FLOWSHEET NOTE
[facility] Outpatient Physical Therapy   [address] [place] [address] [address]   Phone: [phone]   Fax: [phone]    Physical Therapy Daily Treatment Note      Date:  2021  Patient Name:  Jadyn Hendricks    :  1960  MRN: [id number]  EVXHWTIAZ: XKEBW, [address] CNP    ( Dr Rochelle Aguillon secondary  Medical Diagnosis: M47.26 radicular lumbar symptoms, s/p lumbar fusion Z98.1            Rehab Codes: low back pain M54.5  Onset Date: surgery 4/15/21                  Next 's appt. : 21 (8weeks 6/10)  Visit Count:15/24                          Cancel/No Show: 0/0    Subjective:   Pt arrives with a more noticeable limp stating he \"slept on it wrong\" and it just needed to be \"worked out\". Pain:  [x] Yes  [] No Location: Low back Pain Rating: (0-10 scale) 2/10  Pain altered Tx:  [] No  [] Yes  Action:  Comments:    Objective:  Modalities:   Precautions:  Exercises:  Exercise Reps/ Time Weight/ Level Completed  Today Comments   Seated Fwd flexion Stretch 3x 30\"  x           Supine       HS stretch 2x 20\"  x    Piriformis stretch 2x 20\"  x    SLR 10x2      marching 20x      Bridge  10x2   3\" hold       SL       Clamshells 20x      Hip ABD 10x2             standing       3 way hip: BATCA 10x2 15# x    3 way lunge on green foam  5x ea   X     marching 20x  x    squats 10x2  x    TG squats 10x2 lvl 10      Heel toe raises 20x      Step ups fwd/lat  10x 8'  x Fwd/lat   ABCs 1x 4# ball x    Weighted walks  4x 13# x Fwd/retro/lat   Rows/pull downs  10x2 25# x    Other:     Specific Instructions for next treatment:      Assessment: [x] Progressing toward goals. [] No change. [] Other:    [x] Patient would continue to benefit from skilled physical therapy services in order to: improve LE strength, begin to grossly work functional tolerance and work on ROM once appropriate.     : Patient required minimal cueing for correct exercise technique with most standing exercises , continued to challenge patient with standing lunges on foam with notable fatigue at end of exercise. STG/LTG  STG: (to be met in 6 treatments)  1. ? Pain: Improved pain 1/10 at worst  2. ? ROM: hold until appropriate  3. ? Strength: 4+/5 with long sitting testing  4. ? Function: hold goals until more appropriate  5. Independent with Home Exercise Programs  LTG:  (to be met in 12 treatments)  6. ? Pain: Improved pain 0/10 at worst  7. ? ROM: flexion to ankles, extension 10 degrees  8. ? Strength: 4+/5 with long sitting testing  9. ? Function: Able to lift/carry up to 30# once appropriate s/p surgery, able to ambulate as needed 25-30 minutes without increased back and LE pain, able to bend/get to work positions with minimal to no pain. 10. Independent with Home Exercise Program  Patient goals: no pain    Pt. Education:  [x] Yes  [] No  [x] Reviewed Prior HEP/Ed  Method of Education: [x] Verbal  [] Demo  [] Written  Comprehension of Education:  [x] Verbalizes understanding. [] Demonstrates understanding. [] Needs review. [] Demonstrates/verbalizes HEP/Ed previously given. Plan: [x] Continue per plan of care.    [] Other:      Treatment Charges: Mins Units   []  Modalities     [x]  Ther Exercise 45 3   []  Manual Therapy     []  Ther Activities     []  Aquatics     []  Neuromuscular     [] Vasocompression     [] Gait Training     [] Dry needling        [] 1 or 2 muscles        [] 3 or more muscles     []  Other     Total Treatment time 45 3     Time In: 0802am     Time Out: 5117BV    Electronically signed by:  Andrés Benavidez PTA

## 2021-07-07 ENCOUNTER — HOSPITAL ENCOUNTER (OUTPATIENT)
Dept: PHYSICAL THERAPY | Age: 61
Setting detail: THERAPIES SERIES
Discharge: HOME | End: 2021-07-07
Payer: COMMERCIAL

## 2021-07-07 PROCEDURE — 97110 THERAPEUTIC EXERCISES: CPT

## 2021-07-07 NOTE — FLOWSHEET NOTE
[address] [facility] Outpatient Physical Therapy   [address] [facility] [address]   Phone: [phone]   Fax: [phone]    Physical Therapy Daily Treatment Note      Date:  2021  Patient Name:  Mortimer Lora    :  1960  MRN: [id number]  ELAQJVZWW: VRESU, [address] CNP    ( Dr Mike Rivas secondary  Medical Diagnosis: M47.26 radicular lumbar symptoms, s/p lumbar fusion Z98.1            Rehab Codes: low back pain M54.5  Onset Date: surgery 4/15/21                  Next 's appt. : 21 (8weeks 6/10)  Visit Count:                          Cancel/No Show: 0/0    Subjective:   Pt arrives with severe antalgic gait pattern, notes this is his first week back to work full time and he had to do a lot of standing,walking,and stair climbing on concrete. Patient reports numbness/tingling has returned to foot. Pain:  [x] Yes  [] No Location: Low back Pain Rating: (0-10 scale) 3-4/10  Pain altered Tx:  [] No  [] Yes  Action:  Comments:    Objective:  Modalities:   Precautions:  Exercises:  Exercise Reps/ Time Weight/ Level Completed  Today Comments   Seated Fwd flexion Stretch 3x 30\"  x           Supine       HS stretch 2x 20\"  x    Piriformis stretch 2x 20\"  x    SLR 10x2  x    marching 20x  x    Bridge  10x2   3\" hold   x    SL       Clamshells 20x      Hip ABD 10x2  x           standing       3 way hip: BATCA 10x2 15#     3 way lunge on green foam  5x ea        marching 20x      squats 10x2      TG squats 10x2 lvl 10  x    Heel toe raises 20x      Step ups fwd/lat  10x 8'   Fwd/lat   ABCs 1x 4# ball x    Weighted walks  4x 13#  Fwd/retro/lat   Rows/pull downs  10x2 25# x    Other:     Specific Instructions for next treatment:      Assessment: [x] Progressing toward goals. [] No change.      [] Other:    [x] Patient would continue to benefit from skilled physical therapy services in order to: improve LE strength, begin to grossly work functional tolerance and work on ROM once appropriate. 7/7: Due to patient exacerbated symptoms elected to revert back to primarily mat exercise program to patient tolerance, patient noted general faitgue throughout session with increased pain symptoms noted while weight bearing. Some relief of pain was noted when completing mat stretches but not long lasting throughout session. STG/LTG  STG: (to be met in 6 treatments)  1. ? Pain: Improved pain 1/10 at worst  2. ? ROM: hold until appropriate  3. ? Strength: 4+/5 with long sitting testing  4. ? Function: hold goals until more appropriate  5. Independent with Home Exercise Programs  LTG:  (to be met in 12 treatments)  6. ? Pain: Improved pain 0/10 at worst  7. ? ROM: flexion to ankles, extension 10 degrees  8. ? Strength: 4+/5 with long sitting testing  9. ? Function: Able to lift/carry up to 30# once appropriate s/p surgery, able to ambulate as needed 25-30 minutes without increased back and LE pain, able to bend/get to work positions with minimal to no pain. 10. Independent with Home Exercise Program  Patient goals: no pain    Pt. Education:  [x] Yes  [] No  [x] Reviewed Prior HEP/Ed  Method of Education: [x] Verbal  [] Demo  [] Written  Comprehension of Education:  [x] Verbalizes understanding. [] Demonstrates understanding. [] Needs review. [] Demonstrates/verbalizes HEP/Ed previously given. Plan: [x] Continue per plan of care.    [] Other:      Treatment Charges: Mins Units   []  Modalities     [x]  Ther Exercise 35 2   []  Manual Therapy     []  Ther Activities     []  Aquatics     []  Neuromuscular     [] Vasocompression     [] Gait Training     [] Dry needling        [] 1 or 2 muscles        [] 3 or more muscles     []  Other     Total Treatment time 35 2     Time In: 245pm     Time Out: 320pm    Electronically signed by:  Jessica Reeves PTA

## 2021-07-15 ENCOUNTER — HOSPITAL ENCOUNTER (OUTPATIENT)
Dept: PHYSICAL THERAPY | Age: 61
Setting detail: THERAPIES SERIES
Discharge: HOME | End: 2021-07-15
Payer: COMMERCIAL

## 2021-07-15 PROCEDURE — 97110 THERAPEUTIC EXERCISES: CPT

## 2021-07-15 NOTE — FLOWSHEET NOTE
to benefit from skilled physical therapy services in order to: improve LE strength, begin to grossly work functional tolerance and work on ROM once appropriate. 7/15: Upon arrival patient demonstrated antalgic gait pattern with severe lateral sway but patient reports no pain to cause such gait pattern resulting in gait training on treadmill in attempt to re-educate muscles for proper gait pattern, patient was fatigued and unable to keep proper mechanics after 6 minutes this date will attempt to further increase time during the next sessions. Provided patient with green theraband and written HEP which included: SLR,SL hip abd,Prone hip ext, and bridges all 10 reps for 3 sets using theraband, stretches included supine hamstring, and piriformis for 3 sets of 30 seconds, copy in soft chart. STG/LTG  STG: (to be met in 6 treatments)  1. ? Pain: Improved pain 1/10 at worst  2. ? ROM: hold until appropriate  3. ? Strength: 4+/5 with long sitting testing  4. ? Function: hold goals until more appropriate  5. Independent with Home Exercise Programs  LTG:  (to be met in 12 treatments)  6. ? Pain: Improved pain 0/10 at worst  7. ? ROM: flexion to ankles, extension 10 degrees  8. ? Strength: 4+/5 with long sitting testing  9. ? Function: Able to lift/carry up to 30# once appropriate s/p surgery, able to ambulate as needed 25-30 minutes without increased back and LE pain, able to bend/get to work positions with minimal to no pain. 10. Independent with Home Exercise Program  Patient goals: no pain    Pt. Education:  [x] Yes  [] No  [x] Reviewed Prior HEP/Ed  Method of Education: [x] Verbal  [] Demo  [] Written  Comprehension of Education:  [x] Verbalizes understanding. [] Demonstrates understanding. [] Needs review. [] Demonstrates/verbalizes HEP/Ed previously given. Plan: [x] Continue per plan of care.    [] Other:      Treatment Charges: Mins Units   []  Modalities     [x]  Ther Exercise 45 3   []  Manual Therapy []  Ther Activities     []  Aquatics     []  Neuromuscular     [] Vasocompression     [] Gait Training     [] Dry needling        [] 1 or 2 muscles        [] 3 or more muscles     []  Other     Total Treatment time 45 3     Time In: 800am     Time Out: 845am    Electronically signed by:  Ayanna Mcfarlane PTA

## 2021-07-20 ENCOUNTER — HOSPITAL ENCOUNTER (OUTPATIENT)
Dept: PHYSICAL THERAPY | Age: 61
Setting detail: THERAPIES SERIES
Discharge: HOME | End: 2021-07-20
Payer: COMMERCIAL

## 2021-07-20 PROCEDURE — 97110 THERAPEUTIC EXERCISES: CPT

## 2021-07-20 NOTE — FLOWSHEET NOTE
509 [facility]   [address] [facility] [address]   Phone: [phone]   Fax: [phone]    Physical Therapy Daily Treatment Note      Date:  2021  Patient Name:  Giana Mohan    :  1960  MRN: [id number]  CSDUZUKFL: Sunitha LISA CNP    ( Dr Dimple Davison secondary  Medical Diagnosis: M47.26 radicular lumbar symptoms, s/p lumbar fusion Z98.1            Rehab Codes: low back pain M54.5  Onset Date: surgery 4/15/21                  Next 's appt. : 21 (8weeks 6/10)  Visit Count:                          Cancel/No Show: 0/0    Subjective:   Pt states he was walking a lot and going up/down steps while being on concrete. He is back full time at work. Pain:  [x] Yes  [] No Location: Low back Pain Rating: (0-10 scale) 3-4/10  Pain altered Tx:  [] No  [] Yes  Action:  Comments:    Objective:  Modalities:   Precautions:  Exercises:  Exercise Reps/ Time Weight/ Level Completed  Today Comments   Seated Fwd flexion Stretch 3x 30\"  x           Supine       HS stretch 2x 20\"  x    Piriformis stretch 2x 20\"  x    SLR 10x2  x    marching 20x2  x    Bridge  20x2   3\" hold   x    SL       Clamshells 20x2  x    Hip ABD 20x2  x    Reverse Clamshell 20x2  x    standing       3 way hip: BATCA 10x2 15#     3 way lunge on green foam  5x ea        marching 20x      squats 10x2      TG squats 10x2 lvl 10      Heel toe raises 20x      Step ups fwd/lat  10x 8'   Fwd/lat   ABCs 1x 4# ball     Weighted walks  4x 13#  Fwd/retro/lat   Rows/pull downs  10x2 25#     Other:Gait training on treadmill at 1.0incline and 1. 2mph focus primarily on core engagement throughout to prevent lateral sway. Also completing proper hip/knee flexion to prevent circumduction. 6'      Specific Instructions for next treatment:      Assessment: [x] Progressing toward goals. [] No change.      [] Other:    [x] Patient would continue to benefit from skilled physical therapy services in order to: improve LE strength, begin to grossly work functional tolerance and work on ROM once appropriate. 7/20: Patient had increased pain this date secondary to difficult work day. Patient reverted to mat program this date in order to avoid exacerbating symptoms, patient did not have increased pain symptoms but noted fatigue in hips. STG/LTG  STG: (to be met in 6 treatments)  1. ? Pain: Improved pain 1/10 at worst  2. ? ROM: hold until appropriate  3. ? Strength: 4+/5 with long sitting testing  4. ? Function: hold goals until more appropriate  5. Independent with Home Exercise Programs  LTG:  (to be met in 12 treatments)  6. ? Pain: Improved pain 0/10 at worst  7. ? ROM: flexion to ankles, extension 10 degrees  8. ? Strength: 4+/5 with long sitting testing  9. ? Function: Able to lift/carry up to 30# once appropriate s/p surgery, able to ambulate as needed 25-30 minutes without increased back and LE pain, able to bend/get to work positions with minimal to no pain. 10. Independent with Home Exercise Program  Patient goals: no pain    Pt. Education:  [x] Yes  [] No  [x] Reviewed Prior HEP/Ed  Method of Education: [x] Verbal  [] Demo  [] Written  Comprehension of Education:  [x] Verbalizes understanding. [] Demonstrates understanding. [] Needs review. [] Demonstrates/verbalizes HEP/Ed previously given. Plan: [x] Continue per plan of care.    [] Other:      Treatment Charges: Mins Units   []  Modalities     [x]  Ther Exercise 35 2   []  Manual Therapy     []  Ther Activities     []  Aquatics     []  Neuromuscular     [] Vasocompression     [] Gait Training     [] Dry needling        [] 1 or 2 muscles        [] 3 or more muscles     []  Other     Total Treatment time 35 2     Time In: 245pm     Time Out: 320pm    Electronically signed by:  Cece Manning PTA

## 2021-07-22 ENCOUNTER — HOSPITAL ENCOUNTER (OUTPATIENT)
Dept: PHYSICAL THERAPY | Age: 61
Setting detail: THERAPIES SERIES
Discharge: HOME | End: 2021-07-22
Payer: COMMERCIAL

## 2021-07-22 PROCEDURE — 97110 THERAPEUTIC EXERCISES: CPT

## 2021-07-22 NOTE — FLOWSHEET NOTE
[address] [facility]   [address] [facility] [address]   Phone: [phone]   Fax: [phone]    Physical Therapy Daily Treatment Note      Date:  2021  Patient Name:  Bria Mullins    :  1960  MRN: [id number]  RZIQSALHQ: Sunitha SWAIN CNP    ( Dr Jorge Alberto Campo secondary  Medical Diagnosis: M47.26 radicular lumbar symptoms, s/p lumbar fusion Z98.1            Rehab Codes: low back pain M54.5  Onset Date: surgery 4/15/21                  Next 's appt. : 21 (8weeks 6/10)  Visit Count:                          Cancel/No Show: 0/0    Subjective:   Pt states his pain at the end of the work day is \"bad\" states just painful to do any activity after work but compared to his pain prior to surgery it is still more tolerable. Patient states prior to surgery he was unable to bend over and get back up after a long work day. Pain:  [x] Yes  [] No Location: Low back Pain Rating: (0-10 scale) 3-4/10  Pain altered Tx:  [] No  [] Yes  Action:  Comments:    Objective:  Modalities:   Precautions:  Exercises:  Exercise Reps/ Time Weight/ Level Completed  Today Comments   Seated Fwd flexion Stretch 3x 30\"  x           Supine       HS stretch 2x 20\"  x    Piriformis stretch 2x 20\"  x    SLR 10x2  x    marching 20x2  x    Bridge  20x2   3\" hold   x    SL       Clamshells 20x2  x    Hip ABD 20x2  x    Reverse Clamshell 20x2  x    standing       3 way hip: BATCA 10x2 15#     3 way lunge on green foam  5x ea        marching 20x      squats 10x2      TG squats 10x2 lvl 10      Heel toe raises 20x      Step ups fwd/lat  10x 8'   Fwd/lat   ABCs 1x 4# ball x    Weighted walks  4x 13#  Fwd/retro/lat   Rows/pull downs  20x2 35# x    Other:Gait training on treadmill at 1.0incline and 1. 2mph focus primarily on core engagement throughout to prevent lateral sway. Also completing proper hip/knee flexion to prevent circumduction.  6'      Specific Instructions for next treatment:      Assessment: [x] Progressing toward goals. [] No change. [] Other:    [x] Patient would continue to benefit from skilled physical therapy services in order to: improve LE strength, begin to grossly work functional tolerance and work on ROM once appropriate. 7/22: Patient was able to tolerate two standing exercises this date without increased lumbar pain, patient did demonstrate increased difficulty with bed mobility, and sit to stand transfers due to increased pain symptoms. Plan to slowly add more standing activities to patient tolerance next sessions. STG/LTG  STG: (to be met in 6 treatments)  1. ? Pain: Improved pain 1/10 at worst  2. ? ROM: hold until appropriate  3. ? Strength: 4+/5 with long sitting testing  4. ? Function: hold goals until more appropriate  5. Independent with Home Exercise Programs  LTG:  (to be met in 12 treatments)  6. ? Pain: Improved pain 0/10 at worst  7. ? ROM: flexion to ankles, extension 10 degrees  8. ? Strength: 4+/5 with long sitting testing  9. ? Function: Able to lift/carry up to 30# once appropriate s/p surgery, able to ambulate as needed 25-30 minutes without increased back and LE pain, able to bend/get to work positions with minimal to no pain. 10. Independent with Home Exercise Program  Patient goals: no pain    Pt. Education:  [x] Yes  [] No  [x] Reviewed Prior HEP/Ed  Method of Education: [x] Verbal  [] Demo  [] Written  Comprehension of Education:  [x] Verbalizes understanding. [] Demonstrates understanding. [] Needs review. [] Demonstrates/verbalizes HEP/Ed previously given. Plan: [x] Continue per plan of care.    [] Other:      Treatment Charges: Mins Units   []  Modalities     [x]  Ther Exercise 36 2   []  Manual Therapy     []  Ther Activities     []  Aquatics     []  Neuromuscular     [] Vasocompression     [] Gait Training     [] Dry needling        [] 1 or 2 muscles        [] 3 or more muscles     []  Other     Total

## 2021-07-26 ENCOUNTER — HOSPITAL ENCOUNTER (OUTPATIENT)
Dept: PHYSICAL THERAPY | Age: 61
Setting detail: THERAPIES SERIES
Discharge: HOME | End: 2021-07-26
Payer: COMMERCIAL

## 2021-07-26 PROCEDURE — 97110 THERAPEUTIC EXERCISES: CPT

## 2021-07-26 NOTE — FLOWSHEET NOTE
[address] [facility]   [address] [facility] [address]   Phone: [phone]   Fax: [phone]    Physical Therapy Daily Treatment Note      Date:  2021  Patient Name:  Sruthi Erazo    :  1960  MRN: [id number]  LCFXDPWXI: XDJTF, Sunitha CNP    ( Dr Evette Rodrigez secondary  Medical Diagnosis: M47.26 radicular lumbar symptoms, s/p lumbar fusion Z98.1            Rehab Codes: low back pain M54.5  Onset Date: surgery 4/15/21                  Next 's appt. : 21 (8weeks 6/10)  Visit Count:                           Cancel/No Show: 0/0    Subjective:   Pt had come in from work and states that walking on concrete is just difficult for him. Pt reports is performs exercises 1x a day, every other day. Pain:  [x] Yes  [] No Location: Low back Pain Rating: (0-10 scale) 5/10  Pain altered Tx:  [] No  [] Yes  Action:  Comments:    Objective:  Modalities:   Precautions:  Exercises:  Exercise Reps/ Time Weight/ Level Completed  Today Comments   Seated Fwd flexion Stretch 3x 30\"  x  2x 30\"          Supine       HS stretch 2x 20\"  x    Piriformis stretch 2x 20\"  x    SLR 10x2      marching 20x2      Bridge  20x2   3\" hold       SL       Clamshells 20x2      Hip ABD 20x2      Reverse Clamshell 20x2             standing       3 way hip: BATCA 10x2 15# x    3 way lunge on green foam  5x ea        marching 20x      squats 10x2      TG squats 10x2 lvl 10      Heel toe raises 20x      Step ups fwd/lat  10x 8\" x Fwd/lat   ABCs 1x 4# ball     Weighted walks  4x 13# x Fwd/retro/lat   Rows/pull downs  20x2 35# x    Paloff press 10x2 Green  x Added    Walk outs 10x2 green x Added           Other:Gait training on treadmill at 1.0incline and 1. 2mph focus primarily on core engagement throughout to prevent lateral sway. Also completing proper hip/knee flexion to prevent circumduction.  : held     Specific Instructions for next treatment:      Assessment: [x] Progressing toward goals. [] No change. [] Other:    [x] Patient would continue to benefit from skilled physical therapy services in order to: improve LE strength, begin to grossly work functional tolerance and work on ROM once appropriate. 7/26: Pt performed charted exercises above with mild burning pain/sensation in L side Low back as tx progressed. Minimal cueing needed for TrA Activation and to correct posture and technique throughout session. Will continue to progress with core/BLE strengthening as pt can tolerate. STG/LTG  STG: (to be met in 6 treatments)  1. ? Pain: Improved pain 1/10 at worst  2. ? ROM: hold until appropriate  3. ? Strength: 4+/5 with long sitting testing  4. ? Function: hold goals until more appropriate  5. Independent with Home Exercise Programs  LTG:  (to be met in 12 treatments)  6. ? Pain: Improved pain 0/10 at worst  7. ? ROM: flexion to ankles, extension 10 degrees  8. ? Strength: 4+/5 with long sitting testing  9. ? Function: Able to lift/carry up to 30# once appropriate s/p surgery, able to ambulate as needed 25-30 minutes without increased back and LE pain, able to bend/get to work positions with minimal to no pain. 10. Independent with Home Exercise Program  Patient goals: no pain    Pt. Education:  [x] Yes  [] No  [x] Reviewed Prior HEP/Ed  Method of Education: [x] Verbal  [] Demo  [] Written  Comprehension of Education:  [x] Verbalizes understanding. [] Demonstrates understanding. [] Needs review. [] Demonstrates/verbalizes HEP/Ed previously given. Plan: [x] Continue per plan of care.    [] Other:      Treatment Charges: Mins Units   []  Modalities     [x]  Ther Exercise 40 3   []  Manual Therapy     []  Ther Activities     []  Aquatics     []  Neuromuscular     [] Vasocompression     [] Gait Training     [] Dry needling        [] 1 or 2 muscles        [] 3 or more muscles     []  Other     Total Treatment time 40 3     Time In: 1500    Time Out:  1540    Electronically signed by:  Luiz Connelly PTA

## 2021-07-28 ENCOUNTER — HOSPITAL ENCOUNTER (OUTPATIENT)
Dept: PHYSICAL THERAPY | Age: 61
Setting detail: THERAPIES SERIES
Discharge: HOME | End: 2021-07-28
Payer: COMMERCIAL

## 2021-07-28 PROCEDURE — 97110 THERAPEUTIC EXERCISES: CPT

## 2021-07-28 NOTE — FLOWSHEET NOTE
[address] [facility]   [address] [facility] [address]   Phone: [phone]   Fax: [phone]    Physical Therapy Daily Treatment Note      Date:  2021  Patient Name:  Alex Clifford    :  1960  MRN: [id number]  HWVABLRJX: LISAWSunitha CNP    ( Dr Debbie Galindo secondary  Medical Diagnosis: M47.26 radicular lumbar symptoms, s/p lumbar fusion Z98.1            Rehab Codes: low back pain M54.5  Onset Date: surgery 4/15/21                  Next 's appt. : 21 (8weeks 6/10)  Visit Count:                           Cancel/No Show: 0/0    Subjective:   Pt reports he was very sore and had burning sensation down entire left leg after last treatment, subsided after a couple days but still really painful right after work. Pain:  [x] Yes  [] No Location: Low back Pain Rating: (0-10 scale) 5/10  Pain altered Tx:  [] No  [] Yes  Action:  Comments:    Objective:  Modalities:   Precautions:  Exercises:  Exercise Reps/ Time Weight/ Level Completed  Today Comments   Seated Fwd flexion Stretch 3x 30\"  x  2x 30\"          Supine       HS stretch 2x 20\"  x    Piriformis stretch 2x 20\"  x    SLR 10x2 Green TB x    marching 20x2      Bridge  20x2   3\" hold  Green TB x    SL       Clamshells 20x2 Green TB x    Hip ABD 20x2 Green TB x    Reverse Clamshell 20x2 [place] TB  x           standing       3 way hip: BATCA 10x2 15#     3 way lunge on green foam  5x ea        marching 20x      squats 10x2      TG squats 10x2 lvl 10      Heel toe raises 20x      Step ups fwd/lat  10x 8\"  Fwd/lat   ABCs 1x 4# ball x    Weighted walks  4x 13#  Fwd/retro/lat   Rows/pull downs  20x2 35# x    Paloff press 10x2 Green  x Added    Walk outs 10x2 green x Added           Other:Gait training on treadmill at 1.0incline and 1. 2mph focus primarily on core engagement throughout to prevent lateral sway.  Also completing proper hip/knee flexion to prevent circumduction. 7/26: held     Specific Instructions for next treatment:      Assessment: [x] Progressing toward goals. [] No change. [] Other:    [x] Patient would continue to benefit from skilled physical therapy services in order to: improve LE strength, begin to grossly work functional tolerance and work on ROM once appropriate. 7/28: Pt completed all exercises to tolerance, elected to withhold exercises that may have caused (per patient) burning sensation down LLE which included step ups,weighted walks, and weighted 3 way hip. Patient responds well to heavy resisted mat program in beginning of treatment that leads into standing exercises due to less pain noted from patient unloading lower lumbar while supine. STG/LTG  STG: (to be met in 6 treatments)  1. ? Pain: Improved pain 1/10 at worst  2. ? ROM: hold until appropriate  3. ? Strength: 4+/5 with long sitting testing  4. ? Function: hold goals until more appropriate  5. Independent with Home Exercise Programs  LTG:  (to be met in 12 treatments)  6. ? Pain: Improved pain 0/10 at worst  7. ? ROM: flexion to ankles, extension 10 degrees  8. ? Strength: 4+/5 with long sitting testing  9. ? Function: Able to lift/carry up to 30# once appropriate s/p surgery, able to ambulate as needed 25-30 minutes without increased back and LE pain, able to bend/get to work positions with minimal to no pain. 10. Independent with Home Exercise Program  Patient goals: no pain    Pt. Education:  [x] Yes  [] No  [x] Reviewed Prior HEP/Ed  Method of Education: [x] Verbal  [] Demo  [] Written  Comprehension of Education:  [x] Verbalizes understanding. [] Demonstrates understanding. [] Needs review. [] Demonstrates/verbalizes HEP/Ed previously given. Plan: [x] Continue per plan of care.    [] Other:      Treatment Charges: Mins Units   []  Modalities     [x]  Ther Exercise 40 3   []  Manual Therapy     []  Ther Activities     []  Aquatics     []  Neuromuscular     [] Vasocompression     [] Gait Training     [] Dry needling        [] 1 or 2 muscles        [] 3 or more muscles     []  Other     Total Treatment time 40 3     Time In: 220pm   Time Out:  300pm    Electronically signed by:  Maame Mora PTA

## 2021-08-03 ENCOUNTER — HOSPITAL ENCOUNTER (OUTPATIENT)
Dept: PHYSICAL THERAPY | Age: 61
Setting detail: THERAPIES SERIES
Discharge: HOME | End: 2021-08-03
Payer: COMMERCIAL

## 2021-08-03 PROCEDURE — 97110 THERAPEUTIC EXERCISES: CPT

## 2021-08-03 NOTE — FLOWSHEET NOTE
[address] [facility]   [address] [facility] [address]   Phone: [phone]   Fax: [phone]    Physical Therapy Daily Treatment Note    Date:  8/3/2021  Patient Name:  Tigist Gupta    :  1960  MRN: [id number]  TVDXNSKSF: BVEMA, [address] CNP    ( Dr Paramjit Reddy secondary  Medical Diagnosis: M47.26 radicular lumbar symptoms, s/p lumbar fusion Z98.1            Rehab Codes: low back pain M54.5  Onset Date: surgery 4/15/21                  Next 's appt.: 21   Visit Count:                           Cancel/No Show: 0/0    Subjective:   Pt reports pain is down today due to not having to work. Netter tolerance to exercises after last visit- did not hurt for 2 days afterwards. Notes only one position in supine with LLE in certain position to slightly change numbness in left toes- otherwise numbness in left foot/toes is constant. Pain:  [x] Yes  [] No Location: Low back Pain Rating: (0-10 scale) 3/10  Pain altered Tx:  [x] No  [] Yes  Action:  Comments:    Objective:  Modalities:   Precautions:  Exercises:  Exercise Reps/ Time Weight/ Level Completed  Today Comments   Seated Fwd flexion Stretch 2x 30\"  x           Supine       HS stretch 2x 30\"  x    Piriformis stretch 2x 20\"  x    SLR 10x2 Green TB x No band today   marching 20x2      Bridge  20x2   3\" hold  Green TB x    SL       Clamshells 20x2 Green TB x    Hip ABD 20x2 Green TB x    Reverse Clamshell 20x2 Green TB  x           standing       3 way hip: BATCA 10x2 15#     3 way lunge on green foam  5x ea        marching 20x      squats 10x2      TG squats 10x2 lvl 10      Heel toe raises 20x      Step ups fwd/lat  10x 8\"  Fwd/lat   ABCs 1x 4# ball x    Weighted walks  4x 13#  Fwd/retro/lat   Rows/pull downs  20x2 35# x    Paloff press 10x2 Green  x Added    Walk outs 10x2 green x Added           Other:Gait training on treadmill at 1.0incline and 1. 2mph focus primarily on core engagement throughout to prevent lateral sway. Also completing proper hip/knee flexion to prevent circumduction. 7/26: held     Specific Instructions for next treatment:  Progress core strengthening exercises next visit. Assessment: [x] Progressing toward goals. [] No change. [] Other:    [x] Patient would continue to benefit from skilled physical therapy services in order to: improve LE strength, begin to grossly work functional tolerance and work on ROM once appropriate. 8/3: Pt reports fatigue in (B) hips with supine core strengthening exercises. Denies any increased burning with any exercises today. Good technique and posture throughout treatment with cueing to decrease speed to challenge core stability more. STG/LTG  STG: (to be met in 6 treatments)  1. ? Pain: Improved pain 1/10 at worst  2. ? ROM: hold until appropriate  3. ? Strength: 4+/5 with long sitting testing  4. ? Function: hold goals until more appropriate  5. Independent with Home Exercise Programs  LTG:  (to be met in 12 treatments)  6. ? Pain: Improved pain 0/10 at worst  7. ? ROM: flexion to ankles, extension 10 degrees  8. ? Strength: 4+/5 with long sitting testing  9. ? Function: Able to lift/carry up to 30# once appropriate s/p surgery, able to ambulate as needed 25-30 minutes without increased back and LE pain, able to bend/get to work positions with minimal to no pain. 10. Independent with Home Exercise Program  Patient goals: no pain    Pt. Education:  [x] Yes  [] No  [x] Reviewed Prior HEP/Ed  Method of Education: [x] Verbal  [] Demo  [] Written  Comprehension of Education:  [x] Verbalizes understanding. [] Demonstrates understanding. [] Needs review. [] Demonstrates/verbalizes HEP/Ed previously given. Plan: [x] Continue per plan of care.    [] Other:      Treatment Charges: Mins Units   []  Modalities     [x]  Ther Exercise 39 3   []  Manual Therapy     []  Ther Activities     []  Aquatics     [] Neuromuscular     [] Vasocompression     [] Gait Training     [] Dry needling        [] 1 or 2 muscles        [] 3 or more muscles     []  Other     Total Treatment time 39 3     Time In: 5659   Time Out: 5362     Electronically signed by:  Dangelo Campbell PTA

## 2021-08-05 ENCOUNTER — HOSPITAL ENCOUNTER (OUTPATIENT)
Dept: PHYSICAL THERAPY | Age: 61
Setting detail: THERAPIES SERIES
Discharge: HOME | End: 2021-08-05
Payer: COMMERCIAL

## 2021-08-05 NOTE — PROGRESS NOTES
[] [address] [facility]  [address] [facility] [address]  [place]: [phone]   [place]: [phone]     Physical Therapy Cancel/No Show note    Date: 2021  Patient: Quynh Omer  : 1960  MRN: [id number]    Visit Count:   Cancels/No Shows to date:     For today's appointment patient:    [x]  Cancelled    [] Rescheduled appointment    [] No-show     Reason given by patient:    []  Patient ill    []  Conflicting appointment    [] No transportation      [x] Conflict with work    [] No reason given    [] Weather related    [] [id number]    [] Other:      Comments:        [] Next appointment was confirmed    Electronically signed by: Kalyan Garzon PTA

## 2021-08-09 ENCOUNTER — HOSPITAL ENCOUNTER (OUTPATIENT)
Dept: PHYSICAL THERAPY | Age: 61
Setting detail: THERAPIES SERIES
Discharge: HOME | End: 2021-08-09
Payer: COMMERCIAL

## 2021-08-09 PROCEDURE — 97112 NEUROMUSCULAR REEDUCATION: CPT

## 2021-08-09 PROCEDURE — 97110 THERAPEUTIC EXERCISES: CPT

## 2021-08-13 ENCOUNTER — HOSPITAL ENCOUNTER (OUTPATIENT)
Age: 61
Discharge: HOME | End: 2021-08-15
Payer: COMMERCIAL

## 2021-08-13 ENCOUNTER — HOSPITAL ENCOUNTER (OUTPATIENT)
Dept: GENERAL RADIOLOGY | Age: 61
Discharge: HOME | End: 2021-08-15
Payer: COMMERCIAL

## 2021-08-13 DIAGNOSIS — M54.16 LUMBAR RADICULOPATHY: ICD-10-CM

## 2021-08-13 PROCEDURE — 72100 X-RAY EXAM L-S SPINE 2/3 VWS: CPT

## 2021-08-16 ENCOUNTER — OFFICE VISIT (OUTPATIENT)
Dept: NEUROSURGERY | Age: 61
End: 2021-08-16
Payer: COMMERCIAL

## 2021-08-16 VITALS
BODY MASS INDEX: 30.45 KG/M2 | DIASTOLIC BLOOD PRESSURE: 69 MMHG | OXYGEN SATURATION: 97 % | WEIGHT: 194 LBS | HEIGHT: 67 IN | HEART RATE: 60 BPM | SYSTOLIC BLOOD PRESSURE: 109 MMHG

## 2021-08-16 DIAGNOSIS — M47.26 OTHER SPONDYLOSIS WITH RADICULOPATHY, LUMBAR REGION: Primary | ICD-10-CM

## 2021-08-16 PROCEDURE — 99213 OFFICE O/P EST LOW 20 MIN: CPT | Performed by: NEUROLOGICAL SURGERY

## 2021-08-16 NOTE — PROGRESS NOTES
Musa  Rashi  MOB # 2 [address] [place] [id number]  Dept: [phone]    Patient:  Elke Chi  YOB: 1960  Date: 8/16/21    The patient is a 63 y.o. male who presents today for consult of the following problems:     Chief Complaint   Patient presents with    Follow-up             HPI:     Elke Chi is a 63 y.o. male on whom neurosurgical consultation was requested by PAUL Huddleston CNP for management of lumbar spondylosis with radiculopathy. Patient with complete resolution of lower extremity pain numbness and tingling with the exception of just the big toe and the little toe next to it on the left foot. Otherwise has been back to work functioning normally and states that at the end of the long day he does have discomfort in his back that ranges from a 4-5 out of 10 but otherwise able to function relatively normal.  Does walk approximately 4 to 8 miles per day. Melissa Kline History:     Past Medical History:   Diagnosis Date    Chronic back pain     COVID-19 04/02/2021    COVID-19 04/12/2021    Pt tested positive for covid when he went to scheduled appt before surgery on 4/3/2021 @ [facility], no symptoms    Headache(784.0)     Kidney stones     Osteoarthritis     Snores     denies apnea    Wellness examination 03/23/2021    PCP: Joyce Nava, last visit July 2020    Wellness examination 03/23/2021    Neurosurgeon: [facility], last visit March 2021    Wellness examination 03/23/2021    Urology: [facility], pt unsure of doctor, last visit yr ago     Past Surgical History:   Procedure Laterality Date    ANESTHESIA NERVE BLOCK N/A 08/14/2020    NERVE BLOCK - MBB # 1 L3-4, L4-5 performed by Meche Majano MD at [facility] 3    COLONOSCOPY  2012    LITHOTRIPSY  08/2018    LUMBAR FUSION  04/15/2021    L3-L5  POSTERIOR LUMBAR FUSION    LUMBAR FUSION N/A 4/15/2021 with exception of left L5 partial    Motor  L deltoid 5/5; R deltoid 5/5  L biceps 5/5; R biceps 5/5  L triceps 5/5; R triceps 5/5  L wrist extension 5/5; R wrist extension 5/5  L intrinsics 5/5; R intrinsics 5/5     L iliopsoas 5/5 , R iliopsoas 5/5  L quadriceps 5/5; R quadriceps 5/5  L Dorsiflexion 5/5; R dorsiflexion 5/5  L Plantarflexion 5/5; R plantarflexion 5/5  L EHL 5/5; R EHL 5/5    Reflexes  L Brachioradialis 2+/4; R brachioradialis 2+/4  L Biceps 2+/4; R Biceps 2+/4  L Triceps 2+/4; R Triceps 2+/4  L Patellar 2+/4: R Patellar 2+/4  L Achilles 2+/4; R Achilles 2+/4    hoffmans L: neg  hoffmans R: neg  Clonus L: neg  Clonus R: neg  Babinski L: up  Babinski R; up    Studies Review:     Upright AP and lateral x-rays show good lumbar alignment with lordosis as well as stable alignment of all hardware no evidence of malfunction listhesis or fracture. Assessment and Plan:      1. Other spondylosis with radiculopathy, lumbar region          Plan: Patient recovering fairly well 4 months postop L3-5 posterior fixation fusion. Counseled patient on continuation of activity and recommended bike versus elliptical versus swimming over jogging and impact exercises. Also counseled on limiting bending twisting and continuous repetitive motion that can put more strain on the back. Overall I believe that he is healed enough to be relieved of any specific restrictions however. I did  him on the overall long-term risk of adjacent level disease and progressive spinal disease which unfortunately is a progressive process. He did inquire about any further surgeries or procedures that may be necessary. From my standpoint I see no role that he would need anything further done for the specific levels however I did explain that the risk of progressive adjacent level disease and other level degeneration does exist with spine as it is a progressive degenerative process.   However there is no role for prophylactic treatment in this regard and we would watch weight and then address as it was needed. Followup: No follow-ups on file. Prescriptions Ordered:  No orders of the defined types were placed in this encounter. Orders Placed:  No orders of the defined types were placed in this encounter. Electronically signed by Melany Marino DO on 8/16/2021 at 1:28 PM    Please note that this chart was generated using voice recognition Dragon dictation software. Although every effort was made to ensure the accuracy of this automated transcription, some errors in transcription may have occurred.

## 2021-08-18 NOTE — FLOWSHEET NOTE
marching 20x      squats 10x2      TG squats 10x2 lvl 10      Heel toe raises 20x      Step ups fwd/lat  10x 8\"     ABCs 1x 4# ball x    Weighted walks  4x 13#     Rows/pull downs  20x2 35# x    Paloff press 10x2 Green  x    Walk outs 10x2 green x           Other     Specific Instructions for next treatment:  Progress core strengthening exercises next visit. Lumbar ROM:  Flexion to ankles, extension 10 degrees     LE strength:  Hip flexion, abduction, knee flexion, extension 4+/5 (B)  DF, PF, great toe extension 4+/5 (R), (L) 4+/5     TESTS (+/-) LEFT RIGHT Not Tested   SLR []? sit []? supine     []?    Hamstring (SLR)     []?    SKTC     []?    DKTC     []?    Slump/Dural (+) end range (-) []?    SI JT     []?    HERMES     []?    Joint Mobility     []?    Cerv. Comp     []?    Cerv. Distraction     []?    Cerv. Alar/Transverse     []?    Vertebral Artery     []?    Adsons     []?    Adam Butler     []?       OBSERVATION No Deficit Deficit Not Tested Comments   Posture           Forward Head []?  []?  []?      Rounded Shoulders []?  []?  []?      Kyphosis []?  []?  []?      Lordosis []?  []?  []?      Lateral Shift []?  []?  []?      Scoliosis []?  []?  []?      Iliac Crest []?  []?  []?      PSIS []?  []?  []?      ASIS []?  []?  []?      Genu Valgus []?  []?  []?      Genu Varus []?  []?  []?      Genu Recurvatum []?  []?  []?      Pronation []?  []?  []?      Supination []?  []?  []?      Leg Length Discrp []?  []?  []?      Slumped Sitting []?  []?  []?      Palpation []?  []?  []?  Well healed incision   Sensation []?  []?  []?      Edema []?  []?  []?      Neurological []?  []?  []?           Assessment: [x] Progressing toward goals. [] No change. [] Other:    [x] Patient would continue to benefit from skilled physical therapy services in order to: improve LE strength, begin to grossly work functional tolerance and work on ROM once appropriate.     Patient with improved overall general mobility and LE strength with clinical testing at this time. Has returned to lifting/carrying at work. Continues to be limited with complaints of (L) sided numbness at this time, especially with extended knee or with SLR position. This would indicate possible irritation of L4-L5 or L5-S1 nerve secondary to location of symptoms. Patient to follow up with surgeon and continue to progress general function with awareness of posture and positioning during activity. Discharge with fair overall prognosis pending continued (L) LE symptoms and any progress with this symptom. STG: (to be met in 6 treatments)  1. ? Pain: Improved pain 1/10 at worst PROGRESSING  2. ? ROM: hold until appropriate  3. ? Strength: 4+/5 with long sitting testing PROGRESSING  4. ? Function: hold goals until more appropriate  5. Independent with Home Exercise Programs PROGRESSING  LTG:  (to be met in 12 treatments)  6. ? Pain: Improved pain 0/10 at worst PROGRESSING  7. ? ROM: flexion to ankles, extension 10 degrees MET  8. ? Strength: 4+/5 with long sitting testing MET  9. ? Function: Able to lift/carry up to 30# once appropriate s/p surgery MET, able to ambulate as needed 25-30 minutes without increased back and LE pain PROGRESSING, able to bend/get to work positions with minimal to no pain PROGRESSING. 10. Independent with Home Exercise Program MET  Patient goals: no pain     Functional Assessment Used: optimal 9/3 50% limited  Current Status Score:  Goal Status Sore:   Pt. Education:  [x] Yes  [] No  [x] Reviewed Prior HEP/Ed  Method of Education: [x] Verbal  [] Demo  [] Written  Comprehension of Education:  [x] Verbalizes understanding. [] Demonstrates understanding. [] Needs review. [] Demonstrates/verbalizes HEP/Ed previously given. Plan: [x] Continue per plan of care.    [] Other:      Treatment Charges: Mins Units   []  Modalities     [x]  Ther Exercise 39 3   []  Manual Therapy     []  Ther Activities     []  Aquatics     []  Neuromuscular [] Vasocompression     [] Gait Training     [] Dry needling        [] 1 or 2 muscles        [] 3 or more muscles     []  Other     Total Treatment time 39 3     Time In: 230  Time Out: 315     Electronically signed by:  Flavia Auguste PT

## 2021-10-16 ENCOUNTER — HOSPITAL ENCOUNTER (OUTPATIENT)
Age: 61
Discharge: HOME | End: 2021-10-16
Payer: COMMERCIAL

## 2021-10-16 DIAGNOSIS — Z12.5 PROSTATE CANCER SCREENING: ICD-10-CM

## 2021-10-16 DIAGNOSIS — R73.03 PREDIABETES: ICD-10-CM

## 2021-10-16 DIAGNOSIS — Z13.220 SCREENING, LIPID: ICD-10-CM

## 2021-10-16 LAB
ABSOLUTE EOS #: 0.2 K/UL (ref 0–0.4)
ABSOLUTE IMMATURE GRANULOCYTE: ABNORMAL K/UL (ref 0–0.3)
ABSOLUTE LYMPH #: 1.8 K/UL (ref 1–4.8)
ABSOLUTE MONO #: 0.7 K/UL (ref 0.1–1.3)
ALBUMIN SERPL-MCNC: 4.1 G/DL (ref 3.5–5.2)
ALBUMIN/GLOBULIN RATIO: ABNORMAL (ref 1–2.5)
ALP BLD-CCNC: 92 U/L (ref 40–129)
ALT SERPL-CCNC: 12 U/L (ref 5–41)
ANION GAP SERPL CALCULATED.3IONS-SCNC: 10 MMOL/L (ref 9–17)
AST SERPL-CCNC: 14 U/L
BASOPHILS # BLD: 0 % (ref 0–2)
BASOPHILS ABSOLUTE: 0 K/UL (ref 0–0.2)
BILIRUB SERPL-MCNC: 0.33 MG/DL (ref 0.3–1.2)
BUN BLDV-MCNC: 15 MG/DL (ref 6–20)
BUN/CREAT BLD: ABNORMAL (ref 9–20)
CALCIUM SERPL-MCNC: 9.2 MG/DL (ref 8.6–10.4)
CHLORIDE BLD-SCNC: 105 MMOL/L (ref 98–107)
CHOLESTEROL, FASTING: 164 MG/DL
CHOLESTEROL/HDL RATIO: 3.7
CO2: 25 MMOL/L (ref 20–31)
CREAT SERPL-MCNC: 0.8 MG/DL (ref 0.7–1.2)
DIFFERENTIAL TYPE: ABNORMAL
EOSINOPHILS RELATIVE PERCENT: 3 % (ref 0–4)
GFR AFRICAN AMERICAN: >60 ML/MIN
GFR NON-AFRICAN AMERICAN: >60 ML/MIN
GFR SERPL CREATININE-BSD FRML MDRD: ABNORMAL ML/MIN/{1.73_M2}
GFR SERPL CREATININE-BSD FRML MDRD: ABNORMAL ML/MIN/{1.73_M2}
GLUCOSE BLD-MCNC: 169 MG/DL (ref 70–99)
HCT VFR BLD CALC: 45.6 % (ref 41–53)
HDLC SERPL-MCNC: 44 MG/DL
HEMOGLOBIN: 15.2 G/DL (ref 13.5–17.5)
IMMATURE GRANULOCYTES: ABNORMAL %
LDL CHOLESTEROL: 107 MG/DL (ref 0–130)
LYMPHOCYTES # BLD: 26 % (ref 24–44)
MCH RBC QN AUTO: 30.8 PG (ref 26–34)
MCHC RBC AUTO-ENTMCNC: 33.3 G/DL (ref 31–37)
MCV RBC AUTO: 92.6 FL (ref 80–100)
MONOCYTES # BLD: 11 % (ref 1–7)
NRBC AUTOMATED: ABNORMAL PER 100 WBC
PDW BLD-RTO: 13.3 % (ref 11.5–14.9)
PLATELET # BLD: 229 K/UL (ref 150–450)
PLATELET ESTIMATE: ABNORMAL
PMV BLD AUTO: 7.4 FL (ref 6–12)
POTASSIUM SERPL-SCNC: 4.3 MMOL/L (ref 3.7–5.3)
PROSTATE SPECIFIC ANTIGEN: 0.75 UG/L
RBC # BLD: 4.92 M/UL (ref 4.5–5.9)
RBC # BLD: ABNORMAL 10*6/UL
SEG NEUTROPHILS: 60 % (ref 36–66)
SEGMENTED NEUTROPHILS ABSOLUTE COUNT: 4 K/UL (ref 1.3–9.1)
SODIUM BLD-SCNC: 140 MMOL/L (ref 135–144)
TOTAL PROTEIN: 6.9 G/DL (ref 6.4–8.3)
TRIGLYCERIDE, FASTING: 65 MG/DL
VLDLC SERPL CALC-MCNC: NORMAL MG/DL (ref 1–30)
WBC # BLD: 6.7 K/UL (ref 3.5–11)
WBC # BLD: ABNORMAL 10*3/UL

## 2021-10-16 PROCEDURE — 83036 HEMOGLOBIN GLYCOSYLATED A1C: CPT

## 2021-10-16 PROCEDURE — G0103 PSA SCREENING: HCPCS

## 2021-10-16 PROCEDURE — 80061 LIPID PANEL: CPT

## 2021-10-16 PROCEDURE — 80053 COMPREHEN METABOLIC PANEL: CPT

## 2021-10-16 PROCEDURE — 36415 COLL VENOUS BLD VENIPUNCTURE: CPT

## 2021-10-16 PROCEDURE — 85025 COMPLETE CBC W/AUTO DIFF WBC: CPT

## 2021-10-17 LAB
ESTIMATED AVERAGE GLUCOSE: 148 MG/DL
HBA1C MFR BLD: 6.8 % (ref 4–6)

## 2022-06-21 ENCOUNTER — PATIENT MESSAGE (OUTPATIENT)
Dept: NEUROSURGERY | Age: 62
End: 2022-06-21

## 2022-06-24 ENCOUNTER — OFFICE VISIT (OUTPATIENT)
Dept: NEUROSURGERY | Age: 62
End: 2022-06-24
Payer: COMMERCIAL

## 2022-06-24 VITALS
DIASTOLIC BLOOD PRESSURE: 79 MMHG | HEART RATE: 61 BPM | OXYGEN SATURATION: 96 % | HEIGHT: 67 IN | WEIGHT: 192.8 LBS | TEMPERATURE: 98.1 F | SYSTOLIC BLOOD PRESSURE: 122 MMHG | BODY MASS INDEX: 30.26 KG/M2

## 2022-06-24 DIAGNOSIS — Z98.1 S/P LUMBAR FUSION: ICD-10-CM

## 2022-06-24 DIAGNOSIS — M47.26 OTHER SPONDYLOSIS WITH RADICULOPATHY, LUMBAR REGION: Primary | ICD-10-CM

## 2022-06-24 PROCEDURE — 99213 OFFICE O/P EST LOW 20 MIN: CPT | Performed by: NURSE PRACTITIONER

## 2022-06-24 RX ORDER — PREDNISONE 20 MG/1
TABLET ORAL
Qty: 30 TABLET | Refills: 0 | Status: SHIPPED | OUTPATIENT
Start: 2022-06-24 | End: 2022-07-09

## 2022-06-24 NOTE — PROGRESS NOTES
[address]  [facility] [address], [address] [facility] [address]  Dept: [phone]    Patient:  Mortimer Lora  YOB: 1960  Date: 6/24/22    The patient is a [address] [facility]. male who presents today for consult of the following problems:     Chief Complaint   Patient presents with    Back Pain     Severe, occurs about once or twice a month         HPI:     Mortimer Lora is a [address] [facility]o. male who presents for intermittent episodes of recurrent back pain. Has recurrent symptoms about once a month, will suddenly have 10/10 sharp back pain that lasts around 8 hours per episodes. This has been ongoing since last office visit here around a year ago. Does still have some persistent numbness to left leg from prior to last surgery, this has remained stable. Experiences radiating pain down backs of both legs with increased pain. Has used heating pad with limited relief. Did complete extensive therapy after surgery and within the last year, following L3-L5 posterior lumbar fusion in April of last year. Denies any saddle anesthesia, no changes to bowels or bladder. Will take Tylenol/ibuprofen, as well as Norco with acute exacerbations, however these do not provide a great deal of relief. History:     Past Medical History:   Diagnosis Date    Chronic back pain     COVID-19 04/02/2021    COVID-19 04/12/2021    Pt tested positive for covid when he went to scheduled appt before surgery on 4/3/2021 @ [facility], no symptoms    Headache(784.0)     Kidney stones     Osteoarthritis     Snores     denies apnea    Wellness examination 03/23/2021    PCP: Lesley Spence, [facility], last visit July 2020    Wellness examination 03/23/2021    Neurosurgeon: [facility], last visit March 2021    Wellness examination 03/23/2021    Urology: [facility], pt unsure of doctor, last visit yr ago     Past Surgical History: Procedure Laterality Date    ANESTHESIA NERVE BLOCK N/A 08/14/2020    NERVE BLOCK - MBB # 1 L3-4, L4-5 performed by Ezio Lanire MD at [facility] [place]      Had 3    COLONOSCOPY  2012    LITHOTRIPSY  08/2018    LUMBAR FUSION  04/15/2021    L3-L5  POSTERIOR LUMBAR FUSION    LUMBAR FUSION N/A 4/15/2021    L3-L5  POSTERIOR LUMBAR FUSION performed by Gerardo Connolly DO at [address]      laminectomy X2 and Spinal Cord stimulartor implant    NERVE BLOCK Bilateral 08/21/2020    NERVE BLOCK - MBB #2 L3-4 L4-5 performed by Ezio Lanier MD at [address] [place] [address] Right 09/04/2020    NERVE RADIOFREQUENCY ABLATION - L3-4, L4-5 performed by Ezio Lanier MD at [address] [place] [address] Left 09/11/2020    NERVE RADIOFREQUENCY ABLATION L3-4, L4-5 performed by Ezio Lanier MD at [facility]  01/30/2020    removal    SPINAL CORD STIMULATOR SURGERY N/A 01/30/2020    SPINAL CORD STIMULATOR IMPLANT REMOVAL, performed by Gerardo Connolly DO at [address] [place] [address]       Family History   Problem Relation Age of Onset    Diabetes Mother     High Blood Pressure Mother     Diabetes Father     Glaucoma Father     Eczema Father      Current Outpatient Medications on File Prior to Visit   Medication Sig Dispense Refill    HYDROcodone-acetaminophen (NORCO) 5-325 MG per tablet Take 1 tablet by mouth every 6 hours as needed for Pain for up to 30 days. 30 tablet 0    ibuprofen (ADVIL;MOTRIN) 800 MG tablet Take 1 tablet by mouth every 6 hours as needed for Pain 360 tablet 0    pregabalin (LYRICA) 150 MG capsule Take 1 capsule by mouth 2 times daily for 30 days.  60 capsule 5    [facility] is my medical opinion that Dulce Cordova requires a disability parking placard for the following reasons:  He has limited walking ability due to an arthritic condition. Duration of need: permanent 1 each 0    ondansetron (ZOFRAN) 4 MG tablet TAKE ONE TABLET BY MOUTH EVERY 8 HOURS AS NEEDED FOR NAUSEA 90 tablet 0     No current facility-administered medications on file prior to visit. Social History     Tobacco Use    Smoking status: Never Smoker    Smokeless tobacco: Never Used   Vaping Use    Vaping Use: Not on file   Substance Use Topics    Alcohol use: Yes     Comment: rarely, 2 a month    Drug use: No       Allergies   Allergen Reactions    Morphine Other (See Comments)     Patient developed suicidal ideations    Fentanyl Hives       Review of Systems  Constitutional: Negative for activity change and appetite change. HENT: Negative for ear pain and facial swelling. Eyes: Negative for discharge and itching. Respiratory: Negative for choking and chest tightness. Cardiovascular: Negative for chest pain and leg swelling. Gastrointestinal: Negative for nausea and abdominal pain. Endocrine: Negative for cold intolerance and heat intolerance. Genitourinary: Negative for frequency and flank pain. Musculoskeletal: Negative for myalgias and joint swelling. Skin: Negative for rash and wound. Allergic/Immunologic: Negative for environmental allergies and food allergies. Hematological: Negative for adenopathy. Does not bruise/bleed easily. Psychiatric/Behavioral: Negative for self-injury. The patient is not nervous/anxious. Physical Exam:      /79 (Site: Right Upper Arm, Position: Sitting)   Pulse 61   Temp 98.1 °F (36.7 °C) (Temporal)   Ht 5' 7\" (1.702 m)   Wt 192 lb 12.8 oz (87.5 kg)   SpO2 96%   BMI 30.20 kg/m²   Estimated body mass index is 30.2 kg/m² as calculated from the following:    Height as of this encounter: 5' 7\" (1.702 m). Weight as of this encounter: 192 lb 12.8 oz (87.5 kg). General:  Dino Dick is a 63y.o. year old male who appears his stated age. HEENT: Normocephalic atraumatic.  Neck supple. Chest: regular rate; pulses equal  Abdomen: Soft nontender nondistended. Ext: DP and PT pulses 2+, good cap refill  Neuro    Mentation  Appropriate affect  Registration intact  Orientation intact  Judgement intact to situation    Cranial Nerves:   Pupils equal and reactive to light  Extraocular motion intact  Face and shrug symmetric  Tongue midline  No dysarthria  v1-3 sensation symmetric, masseter tone symmetric  Hearing symmetric    Sensation: Still with mild decrease sensation to L5 distribution, greatest distally    Motor  L deltoid 5/5; R deltoid 5/5  L biceps 5/5; R biceps 5/5  L triceps 5/5; R triceps 5/5  L wrist extension 5/5; R wrist extension 5/5  L intrinsics 5/5; R intrinsics 5/5     L iliopsoas 5/5 , R iliopsoas 5/5  L quadriceps 5/5; R quadriceps 5/5  L Dorsiflexion 4/5; R dorsiflexion 5/5  L Plantarflexion 5/5; R plantarflexion 5/5  L EHL 4/5; R EHL 5/5    Reflexes  L Brachioradialis 2+/4; R brachioradialis 2+/4  L Biceps 2+/4; R Biceps 2+/4  L Triceps 2+/4; R Triceps 2+/4  L Patellar 2+/4: R Patellar 2+/4  L Achilles 2+/4; R Achilles 2+/4    hoffmans L: neg  hoffmans R: neg  Clonus L: neg  Clonus R: neg  Babinski L: neg  Babinski R: neg    Studies Review:     X-ray lumbar 8/13/2021:  FINDINGS:   There is pedicle screw and meredith fusion from L3 through L5 for   spondylolisthesis of L4 on L5.  An intervertebral disc spacer is in place at   L4-5.  There is no change in the hardware or evidence of hardware failure by   plain film imaging.  There is disc space narrowing at L4-5.  There is facet   arthropathy at L5-S1.  No acute finding by plain film imaging. An MRI could   better evaluate the spinal canal contents if indicated.         Physical therapy notes reviewed    Assessment and Plan:      1. Other spondylosis with radiculopathy, lumbar region    2. S/P lumbar fusion          Plan: Patient with intermittent exacerbation of severe low back pain with bilateral lower extremity radiation. Prednisone taper provided to start at onset of next exacerbation. We will also obtain lumbar CT for further evaluation of L3-5 fusion. Patient to follow-up in approximately 4 weeks after completion of additional imaging. Followup: Return in about 4 weeks (around 7/22/2022), or if symptoms worsen or fail to improve. Prescriptions Ordered:  Orders Placed This Encounter   Medications    predniSONE (DELTASONE) 20 MG tablet     Sig: Take 3 tablets PO daily for first 5 days, then take 2 tablets PO daily for next 5 days, then take 1 tablet PO daily for last 5 days     Dispense:  30 tablet     Refill:  0      Orders Placed:  Orders Placed This Encounter   Procedures    CT LUMBAR SPINE WO CONTRAST     Standing Status:   Future     Standing Expiration Date:   9/22/2022     Order Specific Question:   Reason for exam:     Answer:   lumabr radiculopathy; post fusion; eval hardware and fusion        Electronically signed by [facility]da [address] [place], [place] [id number] CNP on 6/24/2022 at 2:51 PM    Please note that this chart was generated using voice recognition Dragon dictation software. Although every effort was made to ensure the accuracy of this automated transcription, some errors in transcription may have occurred.

## 2022-07-30 ENCOUNTER — HOSPITAL ENCOUNTER (OUTPATIENT)
Dept: CT IMAGING | Age: 62
Discharge: HOME | End: 2022-08-01
Payer: COMMERCIAL

## 2022-07-30 DIAGNOSIS — M47.26 OTHER SPONDYLOSIS WITH RADICULOPATHY, LUMBAR REGION: ICD-10-CM

## 2022-07-30 DIAGNOSIS — Z98.1 S/P LUMBAR FUSION: ICD-10-CM

## 2022-07-30 PROCEDURE — 72131 CT LUMBAR SPINE W/O DYE: CPT

## 2022-08-15 ENCOUNTER — OFFICE VISIT (OUTPATIENT)
Dept: NEUROSURGERY | Age: 62
End: 2022-08-15
Payer: COMMERCIAL

## 2022-08-15 VITALS
OXYGEN SATURATION: 99 % | SYSTOLIC BLOOD PRESSURE: 117 MMHG | HEIGHT: 67 IN | WEIGHT: 189 LBS | HEART RATE: 72 BPM | BODY MASS INDEX: 29.66 KG/M2 | DIASTOLIC BLOOD PRESSURE: 80 MMHG

## 2022-08-15 DIAGNOSIS — Z98.1 S/P LUMBAR FUSION: ICD-10-CM

## 2022-08-15 DIAGNOSIS — M47.26 OTHER SPONDYLOSIS WITH RADICULOPATHY, LUMBAR REGION: Primary | ICD-10-CM

## 2022-08-15 PROCEDURE — 99212 OFFICE O/P EST SF 10 MIN: CPT | Performed by: NURSE PRACTITIONER

## 2022-08-15 NOTE — PROGRESS NOTES
Wellness examination 03/23/2021    PCP: Diomedes Bhatia [place], last visit July 2020    Wellness examination 03/23/2021    Neurosurgeon: [facility], last visit March 2021    Wellness examination 03/23/2021    Urology: Hu Trinh pt unsure of doctor, last visit yr ago     Past Surgical History:   Procedure Laterality Date    ANESTHESIA NERVE BLOCK N/A 08/14/2020    NERVE BLOCK - MBB # 1 L3-4, L4-5 performed by Jim Lai MD at [facility] 3    COLONOSCOPY  2012    LITHOTRIPSY  08/2018    LUMBAR FUSION  04/15/2021    L3-L5  POSTERIOR LUMBAR FUSION    LUMBAR FUSION N/A 4/15/2021    L3-L5  POSTERIOR LUMBAR FUSION performed by Lisa Connor DO at [facility]      laminectomy X2 and Spinal Cord stimulartor implant    NERVE BLOCK Bilateral 08/21/2020    NERVE BLOCK - MBB #2 L3-4 L4-5 performed by Jim Lai MD at [address] [place] [address] Right 09/04/2020    NERVE RADIOFREQUENCY ABLATION - L3-4, L4-5 performed by Jim Lai MD at [address] [place] [address] Left 09/11/2020    NERVE RADIOFREQUENCY ABLATION L3-4, L4-5 performed by Jim Lai MD at [place] [address]  01/30/2020    removal    SPINAL CORD STIMULATOR SURGERY N/A 01/30/2020    SPINAL CORD STIMULATOR IMPLANT REMOVAL, performed by Lisa Connor DO at [address] [place] [address]       Family History   Problem Relation Age of Onset    Diabetes Mother     High Blood Pressure Mother     Diabetes Father     Glaucoma Father     Eczema Father      Current Outpatient Medications on File Prior to Visit   Medication Sig Dispense Refill    metFORMIN (GLUCOPHAGE XR) 500 MG extended release tablet Take 1 tablet by mouth daily (with breakfast) 30 tablet 5    lisinopril (PRINIVIL;ZESTRIL) 5 MG tablet Take 1 tablet by mouth in the morning.  30 tablet 5    ibuprofen (ADVIL;MOTRIN) 800 MG tablet Take 1 tablet by mouth every 6 hours as needed for Pain 360 tablet 0    ondansetron (ZOFRAN) 4 MG tablet TAKE ONE TABLET BY MOUTH EVERY 8 HOURS AS NEEDED FOR NAUSEA 90 tablet 0    pregabalin (LYRICA) 150 MG capsule Take 1 capsule by mouth 2 times daily for 30 days. 60 capsule 5    Handicap Placard [facility] It is my medical opinion that Andrew Sosa requires a disability parking placard for the following reasons:  He has limited walking ability due to an arthritic condition. Duration of need: permanent 1 each 0     No current facility-administered medications on file prior to visit. Social History     Tobacco Use    Smoking status: Never    Smokeless tobacco: Never   Substance Use Topics    Alcohol use: Yes     Comment: rarely, 2 a month    Drug use: No       Allergies   Allergen Reactions    Morphine Other (See Comments)     Patient developed suicidal ideations    Fentanyl Hives       Review of Systems  Constitutional: Negative for activity change and appetite change. HENT: Negative for ear pain and facial swelling. Eyes: Negative for discharge and itching. Respiratory: Negative for choking and chest tightness. Cardiovascular: Negative for chest pain and leg swelling. Gastrointestinal: Negative for nausea and abdominal pain. Endocrine: Negative for cold intolerance and heat intolerance. Genitourinary: Negative for frequency and flank pain. Musculoskeletal: Negative for myalgias and joint swelling. Skin: Negative for rash and wound. Allergic/Immunologic: Negative for environmental allergies and food allergies. Hematological: Negative for adenopathy. Does not bruise/bleed easily. Psychiatric/Behavioral: Negative for self-injury. The patient is not nervous/anxious.       Physical Exam:      /80   Pulse 72   Ht 5' 7\" (1.702 m)   Wt 189 lb (85.7 kg)   SpO2 99%   BMI 29.60 kg/m²   Estimated body mass index is 29.6 kg/m² as calculated from the following:    Height as of this encounter: 5' 7\" (1.702 m).    Weight as of this encounter: 189 lb (85.7 kg). General:  Maya Wiley is a 63y.o. year old male who appears his stated age. HEENT: Normocephalic atraumatic. Neck supple. Chest: regular rate; pulses equal  Abdomen: Soft nontender nondistended. Ext: DP and PT pulses 2+, good cap refill  Neuro    Mentation  Appropriate affect  Registration intact  Orientation intact  Judgement intact to situation    Cranial Nerves:   Pupils equal and reactive to light  Extraocular motion intact  Face and shrug symmetric  Tongue midline  No dysarthria  v1-3 sensation symmetric, masseter tone symmetric  Hearing symmetric    Sensation: Still with mild decrease sensation to L5 distribution, greatest distally    Motor  L deltoid 5/5; R deltoid 5/5  L biceps 5/5; R biceps 5/5  L triceps 5/5; R triceps 5/5  L wrist extension 5/5; R wrist extension 5/5  L intrinsics 5/5; R intrinsics 5/5     L iliopsoas 5/5 , R iliopsoas 5/5  L quadriceps 5/5; R quadriceps 5/5  L Dorsiflexion 4+/5; R dorsiflexion 5/5  L Plantarflexion 5/5; R plantarflexion 5/5  L EHL 4/5; R EHL 5/5    Reflexes  L Brachioradialis 2+/4; R brachioradialis 2+/4  L Biceps 2+/4; R Biceps 2+/4  L Triceps 2+/4; R Triceps 2+/4  L Patellar 2+/4: R Patellar 2+/4  L Achilles 2+/4; R Achilles 2+/4    hoffmans L: neg  hoffmans R: neg  Clonus L: neg  Clonus R: neg  Babinski L: neg  Babinski R: neg    Negative straight leg raise    Studies Review:     CT lumbar 7/30/2022:  FINDINGS:   BONES/ALIGNMENT: L4-L5 interbody fusion is noted without evidence of hardware   loosening or complication identified. L3 through L5 posterior lumbar fusion   hardware is noted with cannulated pedicle screws attached to posterior   fixation rods without evidence of hardware loosening or malalignment   identified. L4 on L5 anterolisthesis measuring 5 mm is noted within the   fused segment. L3 on L4 retrolisthesis is present which measures 2.3 mm.    There is otherwise unremarkable alignment of the spine. The vertebral body   heights are maintained. No osseous destructive lesion is seen. L3 and L4 left laminectomy postoperative findings are seen. DEGENERATIVE CHANGES: No significant degenerative changes of the lumbar spine. SOFT TISSUES/RETROPERITONEUM: No paraspinal mass is seen. X-ray lumbar 8/13/2021:  FINDINGS:   There is pedicle screw and meredith fusion from L3 through L5 for   spondylolisthesis of L4 on L5. An intervertebral disc spacer is in place at   L4-5. There is no change in the hardware or evidence of hardware failure by   plain film imaging. There is disc space narrowing at L4-5. There is facet   arthropathy at L5-S1. No acute finding by plain film imaging. An MRI could   better evaluate the spinal canal contents if indicated. Assessment and Plan:      1. Other spondylosis with radiculopathy, lumbar region    2. S/P lumbar fusion            Plan: Patient with intermittent exacerbation of severe low back pain with bilateral lower extremity radiation. Patient continues to have pain on a daily basis, waxing and waning intensity, that does interfere with daily activities. Did complete extensive aquatic and land therapy after recent surgery. We will have patient follow-up for evaluation with Dr. Mushtaq Islas for additional recommendations. May benefit from reestablishing with pain management. Followup: Return in about 4 weeks (around 9/12/2022), or if symptoms worsen or fail to improve. Prescriptions Ordered:  No orders of the defined types were placed in this encounter. Orders Placed:  No orders of the defined types were placed in this encounter. Electronically signed by PAUL Carmichael CNP on 8/15/2022 at 2:28 PM    Please note that this chart was generated using voice recognition Dragon dictation software.   Although every effort was made to ensure the accuracy of this automated transcription, some errors in transcription may have occurred.

## 2022-09-07 ENCOUNTER — OFFICE VISIT (OUTPATIENT)
Dept: NEUROSURGERY | Age: 62
End: 2022-09-07
Payer: COMMERCIAL

## 2022-09-07 VITALS
WEIGHT: 189 LBS | TEMPERATURE: 98 F | BODY MASS INDEX: 29.66 KG/M2 | DIASTOLIC BLOOD PRESSURE: 70 MMHG | SYSTOLIC BLOOD PRESSURE: 113 MMHG | RESPIRATION RATE: 18 BRPM | OXYGEN SATURATION: 98 % | HEART RATE: 59 BPM | HEIGHT: 67 IN

## 2022-09-07 DIAGNOSIS — M47.26 OTHER SPONDYLOSIS WITH RADICULOPATHY, LUMBAR REGION: Primary | ICD-10-CM

## 2022-09-07 PROCEDURE — 99213 OFFICE O/P EST LOW 20 MIN: CPT | Performed by: NEUROLOGICAL SURGERY

## 2022-09-07 NOTE — PROGRESS NOTES
[address]  [facility] [address], [address] [facility] [address]  Dept: [phone]    Patient:  Jim Byrnes  YOB: 1960  Date: 9/7/22    The patient is a 63 y.o. male who presents today for consult of the following problems:     Chief Complaint   Patient presents with    Follow-up             HPI:     Jim Byrnes is a 63 y.o. male on whom neurosurgical consultation was requested by Claudetta Rainbow, APRN - CNP for management of left foot neuropathy involving L5 dermatome. Has been chronic since surgery and somewhat had severe pain before surgeries was unable to tell. Has been able to ambulate and basal level of back pain is a 0 out of 10 with approximately 2-3 episodes per week where he gets significant spasmodic discomfort if he is walking or on his feet a lot at work. States that if he can get home right away and resting eases up and oftentimes will mitigate. Does not have any radiating lower extremity symptoms. .      History:     Past Medical History:   Diagnosis Date    Chronic back pain     COVID-19 04/02/2021    COVID-19 04/12/2021    Pt tested positive for covid when he went to scheduled appt before surgery on 4/3/2021 @ [facility], no symptoms    Headache(784.0)     Kidney stones     Osteoarthritis     Snores     denies apnea    Wellness examination 03/23/2021    PCP: Raul Pride, last visit July 2020    Wellness examination 03/23/2021    Neurosurgeon: [facility], last visit March 2021    Wellness examination 03/23/2021    Urology: jean paul Whitfield unsure of doctor, last visit yr ago     Past Surgical History:   Procedure Laterality Date    ANESTHESIA NERVE BLOCK N/A 08/14/2020    NERVE BLOCK - MBB # 1 L3-4, L4-5 performed by Arabella Haywood MD at [facility]      Had 3    COLONOSCOPY  2012    LITHOTRIPSY  08/2018    LUMBAR FUSION 04/15/2021    L3-L5  POSTERIOR LUMBAR FUSION    LUMBAR FUSION N/A 4/15/2021    L3-L5  POSTERIOR LUMBAR FUSION performed by Caty Roldan DO at [facility]      laminectomy X2 and Spinal Cord stimulartor implant    NERVE BLOCK Bilateral 08/21/2020    NERVE BLOCK - MBB #2 L3-4 L4-5 performed by Merrilee Ormond, MD at [address] [place] [address] Right 09/04/2020    NERVE RADIOFREQUENCY ABLATION - L3-4, L4-5 performed by Merrilee Ormond, MD at [address] [place] [address] Left 09/11/2020    NERVE RADIOFREQUENCY ABLATION L3-4, L4-5 performed by Merrilee Ormond, MD at [place] [address]  01/30/2020    removal    SPINAL CORD STIMULATOR SURGERY N/A 01/30/2020    SPINAL CORD STIMULATOR IMPLANT REMOVAL, performed by Caty Roldan DO at [address] [place] [address]       Family History   Problem Relation Age of Onset    Diabetes Mother     High Blood Pressure Mother     Diabetes Father     Glaucoma Father     Eczema Father      Current Outpatient Medications on File Prior to Visit   Medication Sig Dispense Refill    HYDROcodone-acetaminophen (NORCO) 5-325 MG per tablet Take 1 tablet by mouth every 6 hours as needed for Pain for up to 30 days. 30 tablet 0    metFORMIN (GLUCOPHAGE XR) 500 MG extended release tablet Take 1 tablet by mouth daily (with breakfast) 30 tablet 5    lisinopril (PRINIVIL;ZESTRIL) 5 MG tablet Take 1 tablet by mouth in the morning. 30 tablet 5    ibuprofen (ADVIL;MOTRIN) 800 MG tablet Take 1 tablet by mouth every 6 hours as needed for Pain 360 tablet 0    Handicap Placard [facility] It is my medical opinion that Kee Bedoya requires a disability parking placard for the following reasons:  He has limited walking ability due to an arthritic condition.   Duration of need: permanent 1 each 0    ondansetron (ZOFRAN) 4 MG tablet TAKE ONE TABLET BY MOUTH EVERY 8 HOURS AS NEEDED FOR NAUSEA 90 tablet 0    pregabalin (LYRICA) 150 MG capsule Take 1 capsule by mouth 2 times daily for 30 days. 60 capsule 5     No current facility-administered medications on file prior to visit. Social History     Tobacco Use    Smoking status: Never    Smokeless tobacco: Never   Substance Use Topics    Alcohol use: Yes     Comment: rarely, 2 a month    Drug use: No       Allergies   Allergen Reactions    Morphine Other (See Comments)     Patient developed suicidal ideations    Fentanyl Hives       Review of Systems  ROS: numbness LLE. Physical Exam:      /70 (Site: Left Upper Arm, Position: Sitting, Cuff Size: Large Adult)   Pulse 59   Temp 98 °F (36.7 °C) (Oral)   Resp 18   Ht 5' 7\" (1.702 m)   Wt 189 lb (85.7 kg)   SpO2 98%   BMI 29.60 kg/m²   Estimated body mass index is 29.6 kg/m² as calculated from the following:    Height as of this encounter: 5' 7\" (1.702 m). Weight as of this encounter: 189 lb (85.7 kg). General:  Landy Renner is a 63y.o. year old male who appears his stated age. HEENT: Normocephalic atraumatic. Neck supple. Chest: regular rate; pulses equal. Equal chest rise and fall  Abdomen: Soft nondistended.    Ext: DP equal with good capillary refill  Neuro    Mentation  Appropriate affect   oriented    Cranial Nerves:   Pupils equal and reactive to light  Extraocular motion intact  Face symmetric  No dysarthria  v1-3 sensation symmetric, masseter tone symmetric  Hearing symmetric and intact to finger rub    Sensation:   LLE L5 slight numbness    Motor  L deltoid 5/5; R deltoid 5/5  L biceps 5/5; R biceps 5/5  L triceps 5/5; R triceps 5/5  L wrist extension 5/5; R wrist extension 5/5  L intrinsics 5/5; R intrinsics 5/5     L iliopsoas 5/5 , R iliopsoas 5/5  L quadriceps 5/5; R quadriceps 5/5  L Dorsiflexion 5/5; R dorsiflexion 5/5  L Plantarflexion 5/5; R plantarflexion 5/5  L EHL 5/5; R EHL 5/5    Reflexes  L Brachioradialis 2+/4; R brachioradialis 2+/4  L Biceps 2+/4; R Biceps 2+/4  L Triceps 2+/4; R Triceps 2+/4  L Patellar 2+/4: R Patellar 2+/4  L Achilles 2+/4; R Achilles 2+/4    hoffmans L: neg  hoffmans R: neg  Clonus L: neg  Clonus R: neg  Babinski L: up  Babinski R; up    Studies Review:     Ct L solid fusion L3-5    Assessment and Plan:      1. Other spondylosis with radiculopathy, lumbar region          Plan: minimal discomfort related to chronic L L5 partial neuropathy but overall minimal back discomfort & no LE radiating pain. Prn     Followup: No follow-ups on file. Prescriptions Ordered:  No orders of the defined types were placed in this encounter. Orders Placed:  No orders of the defined types were placed in this encounter. Electronically signed by Kelly Vigil DO on 9/7/2022 at 4:14 PM    Please note that this chart was generated using voice recognition Dragon dictation software. Although every effort was made to ensure the accuracy of this automated transcription, some errors in transcription may have occurred.

## 2023-02-07 ENCOUNTER — TELEPHONE (OUTPATIENT)
Dept: SURGERY | Age: 63
End: 2023-02-07

## 2023-02-07 NOTE — TELEPHONE ENCOUNTER
Office lvm for pt to return office call to get scheduled per referral .     Electronically signed by Raghavendra Cerrato MA on 2/7/2023 at 9:12 AM

## 2023-02-11 ENCOUNTER — HOSPITAL ENCOUNTER (OUTPATIENT)
Age: 63
Discharge: HOME | End: 2023-02-11
Payer: COMMERCIAL

## 2023-02-11 DIAGNOSIS — Z12.5 PROSTATE CANCER SCREENING: ICD-10-CM

## 2023-02-11 DIAGNOSIS — E11.9 TYPE 2 DIABETES MELLITUS WITHOUT COMPLICATION, WITHOUT LONG-TERM CURRENT USE OF INSULIN (HCC): ICD-10-CM

## 2023-02-11 LAB
ALBUMIN SERPL-MCNC: 4 G/DL (ref 3.5–5.2)
ALP SERPL-CCNC: 63 U/L (ref 40–129)
ALT SERPL-CCNC: 14 U/L (ref 5–41)
ANION GAP SERPL CALCULATED.3IONS-SCNC: 4 MMOL/L (ref 9–17)
AST SERPL-CCNC: 13 U/L
BILIRUB SERPL-MCNC: 0.5 MG/DL (ref 0.3–1.2)
BUN SERPL-MCNC: 19 MG/DL (ref 8–23)
CALCIUM SERPL-MCNC: 9 MG/DL (ref 8.6–10.4)
CHLORIDE SERPL-SCNC: 105 MMOL/L (ref 98–107)
CHOLEST SERPL-MCNC: 148 MG/DL
CHOLESTEROL/HDL RATIO: 3.2
CO2 SERPL-SCNC: 28 MMOL/L (ref 20–31)
CREAT SERPL-MCNC: 0.98 MG/DL (ref 0.7–1.2)
CREATININE URINE: 112.4 MG/DL (ref 39–259)
GFR SERPL CREATININE-BSD FRML MDRD: >60 ML/MIN/1.73M2
GLUCOSE SERPL-MCNC: 141 MG/DL (ref 70–99)
HCT VFR BLD AUTO: 46.6 % (ref 41–53)
HDLC SERPL-MCNC: 46 MG/DL
HGB BLD-MCNC: 15.3 G/DL (ref 13.5–17.5)
LDLC SERPL CALC-MCNC: 88 MG/DL (ref 0–130)
MCH RBC QN AUTO: 30.7 PG (ref 26–34)
MCHC RBC AUTO-ENTMCNC: 32.8 G/DL (ref 31–37)
MCV RBC AUTO: 93.6 FL (ref 80–100)
MICROALBUMIN/CREAT 24H UR: <12 MG/L
MICROALBUMIN/CREAT UR-RTO: NORMAL MCG/MG CREAT
PDW BLD-RTO: 13.1 % (ref 11.5–14.9)
PLATELET # BLD AUTO: 233 K/UL (ref 150–450)
PMV BLD AUTO: 7.3 FL (ref 6–12)
POTASSIUM SERPL-SCNC: 4.8 MMOL/L (ref 3.7–5.3)
PROSTATE SPECIFIC ANTIGEN: 0.52 NG/ML
PROT SERPL-MCNC: 6.9 G/DL (ref 6.4–8.3)
RBC # BLD: 4.97 M/UL (ref 4.5–5.9)
SODIUM SERPL-SCNC: 137 MMOL/L (ref 135–144)
TRIGL SERPL-MCNC: 68 MG/DL
WBC # BLD AUTO: 5.6 K/UL (ref 3.5–11)

## 2023-02-11 PROCEDURE — 85027 COMPLETE CBC AUTOMATED: CPT

## 2023-02-11 PROCEDURE — 36415 COLL VENOUS BLD VENIPUNCTURE: CPT

## 2023-02-11 PROCEDURE — 80061 LIPID PANEL: CPT

## 2023-02-11 PROCEDURE — 82570 ASSAY OF URINE CREATININE: CPT

## 2023-02-11 PROCEDURE — 80053 COMPREHEN METABOLIC PANEL: CPT

## 2023-02-11 PROCEDURE — G0103 PSA SCREENING: HCPCS

## 2023-02-11 PROCEDURE — 82043 UR ALBUMIN QUANTITATIVE: CPT

## 2023-07-06 ENCOUNTER — OFFICE VISIT (OUTPATIENT)
Dept: PODIATRY | Age: 63
End: 2023-07-06
Payer: COMMERCIAL

## 2023-07-06 ENCOUNTER — TELEPHONE (OUTPATIENT)
Dept: PODIATRY | Age: 63
End: 2023-07-06

## 2023-07-06 VITALS — HEIGHT: 67 IN | BODY MASS INDEX: 29.03 KG/M2 | WEIGHT: 185 LBS

## 2023-07-06 DIAGNOSIS — M72.2 PLANTAR FASCIITIS OF LEFT FOOT: Primary | ICD-10-CM

## 2023-07-06 DIAGNOSIS — M79.672 PAIN IN LEFT FOOT: ICD-10-CM

## 2023-07-06 PROCEDURE — 99203 OFFICE O/P NEW LOW 30 MIN: CPT | Performed by: PODIATRIST

## 2023-07-06 ASSESSMENT — ENCOUNTER SYMPTOMS
NAUSEA: 0
SHORTNESS OF BREATH: 0
DIARRHEA: 0
BACK PAIN: 0
COLOR CHANGE: 0

## 2023-12-30 ENCOUNTER — HOSPITAL ENCOUNTER (OUTPATIENT)
Age: 63
Discharge: HOME | End: 2023-12-30
Payer: COMMERCIAL

## 2023-12-30 DIAGNOSIS — E11.9 TYPE 2 DIABETES MELLITUS WITHOUT COMPLICATION, WITHOUT LONG-TERM CURRENT USE OF INSULIN (HCC): ICD-10-CM

## 2023-12-30 LAB
ALBUMIN SERPL-MCNC: 3.9 G/DL (ref 3.5–5.2)
ALP SERPL-CCNC: 59 U/L (ref 40–129)
ALT SERPL-CCNC: 20 U/L (ref 5–41)
ANION GAP SERPL CALCULATED.3IONS-SCNC: 8 MMOL/L (ref 9–17)
AST SERPL-CCNC: 18 U/L
BASOPHILS # BLD: 0 K/UL (ref 0–0.2)
BASOPHILS NFR BLD: 0 % (ref 0–2)
BILIRUB SERPL-MCNC: 0.5 MG/DL (ref 0.3–1.2)
BUN SERPL-MCNC: 13 MG/DL (ref 8–23)
CALCIUM SERPL-MCNC: 9.3 MG/DL (ref 8.6–10.4)
CHLORIDE SERPL-SCNC: 103 MMOL/L (ref 98–107)
CHOLEST SERPL-MCNC: 174 MG/DL
CHOLESTEROL/HDL RATIO: 3.9
CO2 SERPL-SCNC: 26 MMOL/L (ref 20–31)
CREAT SERPL-MCNC: 0.9 MG/DL (ref 0.7–1.2)
EOSINOPHIL # BLD: 0.1 K/UL (ref 0–0.4)
EOSINOPHILS RELATIVE PERCENT: 2 % (ref 0–4)
ERYTHROCYTE [DISTWIDTH] IN BLOOD BY AUTOMATED COUNT: 13 % (ref 11.5–14.9)
GFR SERPL CREATININE-BSD FRML MDRD: >60 ML/MIN/1.73M2
GLUCOSE SERPL-MCNC: 165 MG/DL (ref 70–99)
HCT VFR BLD AUTO: 44 % (ref 41–53)
HDLC SERPL-MCNC: 45 MG/DL
HGB BLD-MCNC: 15 G/DL (ref 13.5–17.5)
LDLC SERPL CALC-MCNC: 109 MG/DL (ref 0–130)
LYMPHOCYTES NFR BLD: 1.7 K/UL (ref 1–4.8)
LYMPHOCYTES RELATIVE PERCENT: 29 % (ref 24–44)
MCH RBC QN AUTO: 32.4 PG (ref 26–34)
MCHC RBC AUTO-ENTMCNC: 34.1 G/DL (ref 31–37)
MCV RBC AUTO: 95.1 FL (ref 80–100)
MONOCYTES NFR BLD: 0.5 K/UL (ref 0.1–1.3)
MONOCYTES NFR BLD: 10 % (ref 1–7)
NEUTROPHILS NFR BLD: 59 % (ref 36–66)
NEUTS SEG NFR BLD: 3.3 K/UL (ref 1.3–9.1)
PLATELET # BLD AUTO: 223 K/UL (ref 150–450)
PMV BLD AUTO: 7.6 FL (ref 6–12)
POTASSIUM SERPL-SCNC: 4.4 MMOL/L (ref 3.7–5.3)
PROT SERPL-MCNC: 7.1 G/DL (ref 6.4–8.3)
RBC # BLD AUTO: 4.63 M/UL (ref 4.5–5.9)
SODIUM SERPL-SCNC: 137 MMOL/L (ref 135–144)
TRIGL SERPL-MCNC: 98 MG/DL
WBC OTHER # BLD: 5.6 K/UL (ref 3.5–11)

## 2023-12-30 PROCEDURE — 85025 COMPLETE CBC W/AUTO DIFF WBC: CPT

## 2023-12-30 PROCEDURE — 80053 COMPREHEN METABOLIC PANEL: CPT

## 2023-12-30 PROCEDURE — 80061 LIPID PANEL: CPT

## 2023-12-30 PROCEDURE — 36415 COLL VENOUS BLD VENIPUNCTURE: CPT

## 2024-02-07 PROBLEM — E11.9 TYPE 2 DIABETES MELLITUS WITHOUT COMPLICATION, WITHOUT LONG-TERM CURRENT USE OF INSULIN (HCC): Status: ACTIVE | Noted: 2024-02-07

## 2024-07-17 ENCOUNTER — TELEPHONE (OUTPATIENT)
Dept: NEUROSURGERY | Age: 64
End: 2024-07-17

## 2024-07-18 ENCOUNTER — OFFICE VISIT (OUTPATIENT)
Dept: NEUROSURGERY | Age: 64
End: 2024-07-18
Payer: COMMERCIAL

## 2024-07-18 VITALS
SYSTOLIC BLOOD PRESSURE: 106 MMHG | HEART RATE: 52 BPM | BODY MASS INDEX: 29.82 KG/M2 | HEIGHT: 67 IN | DIASTOLIC BLOOD PRESSURE: 66 MMHG | WEIGHT: 190 LBS

## 2024-07-18 DIAGNOSIS — Z98.1 HISTORY OF LUMBAR FUSION: ICD-10-CM

## 2024-07-18 DIAGNOSIS — M54.16 LUMBAR RADICULOPATHY: ICD-10-CM

## 2024-07-18 DIAGNOSIS — M47.816 LUMBAR SPONDYLOSIS: Primary | ICD-10-CM

## 2024-07-18 PROCEDURE — 99214 OFFICE O/P EST MOD 30 MIN: CPT | Performed by: NURSE PRACTITIONER

## 2024-07-18 NOTE — PROGRESS NOTES
[facility] NEUROSURGERY [facility]  [address]  [address]  [address], [address]  [facility] [address]  Dept: [phone]    Patient:  Dewayne Rob  YOB: 1960  Date: 7/18/24    The patient is a 64 y.o. male who presents today for consult of the following problems:     Chief Complaint   Patient presents with    Back Pain     Lower back pain and pt stated his left foot is still numb.         HPI:     Dewayne Rob is a 64 y.o. male who presents for follow up of low back pain.  Patient has had ongoing issues with low back pain, as well as left lower extremity radiculopathy.  Did undergo decompression fusion of L3-L5 in April 2021.  Did have persistent residual numbness to left foot.  This remains unchanged.  Tends to affect all of his toes, approximately midway into foot.  Does not change.  Has also been experiencing issues with axial low back pain.  Pain has been 7-8/10 on average.  It is essentially localized to his lower back.  It is described as an aching, throbbing pain.  It is worsened with prolonged sitting, walking or standing.  Pain is worsened with increased activity, also notices discomfort with rainy days.  Utilizes heat, over-the-counter medications as well as Norco for severe pain.  Did retire from his job on January 1, had a difficult time with the significant physical requirements.    History:     Past Medical History:   Diagnosis Date    Chronic back pain     COVID-19 04/02/2021    COVID-19 04/12/2021    Pt tested positive for covid when he went to scheduled appt before surgery on 4/3/2021 @ [facility], no symptoms    Headache(784.0)     Kidney stones     Osteoarthritis     Snores     denies apnea    Wellness examination 03/23/2021    PCP: Racheal Mooney, last visit July 2020    Wellness examination 03/23/2021    Neurosurgeon: [facility], last visit March 2021    Wellness examination 03/23/2021

## 2024-07-23 ENCOUNTER — HOSPITAL ENCOUNTER (OUTPATIENT)
Dept: CT IMAGING | Age: 64
Discharge: HOME | End: 2024-07-25
Payer: COMMERCIAL

## 2024-07-23 ENCOUNTER — HOSPITAL ENCOUNTER (OUTPATIENT)
Age: 64
Discharge: HOME | End: 2024-07-25
Payer: COMMERCIAL

## 2024-07-23 ENCOUNTER — HOSPITAL ENCOUNTER (OUTPATIENT)
Dept: GENERAL RADIOLOGY | Age: 64
Discharge: HOME | End: 2024-07-25
Payer: COMMERCIAL

## 2024-07-23 DIAGNOSIS — M47.816 LUMBAR SPONDYLOSIS: ICD-10-CM

## 2024-07-23 DIAGNOSIS — Z98.1 HISTORY OF LUMBAR FUSION: ICD-10-CM

## 2024-07-23 PROCEDURE — 72131 CT LUMBAR SPINE W/O DYE: CPT

## 2024-07-23 PROCEDURE — 72110 X-RAY EXAM L-2 SPINE 4/>VWS: CPT

## 2024-08-09 ENCOUNTER — HOSPITAL ENCOUNTER (OUTPATIENT)
Dept: PHYSICAL THERAPY | Age: 64
Setting detail: THERAPIES SERIES
Discharge: HOME | End: 2024-08-09

## 2024-08-09 NOTE — THERAPY EVALUATION
[facility]   Outpatient Rehabilitation & Therapy  [address]  P: [phone]   F: [phone]     Physical Therapy Evaluation    Date:  2024  Patient: Dewayne Rob  : 1960  MRN: [id number]  Physician: Sunitha Ribeiro APRN - CNP      Insurance: [facility] ([employer], Auth after 30)  Medical Diagnosis:   M47.816 (ICD-10-CM) - Lumbar spondylosis   Z98.1 (ICD-10-CM) - History of lumbar fusion   Rehab Codes: M54.50 Low back pain, M62.81 weakness   Onset Date: Chronic                                 Next 's appt: 24    Subjective:   CC:Low back pain  HPI: Low back pain with numbness in left foot. Bending and standing will be bothersome. Walkin minutes. He retired in January and will work in the [employer].     Aggravating Factors:sitting walking standing, bending  Alleviating Factors: heat, meds    PMHx: [] Unremarkable [x] Diabetes [] HTN  [] Pacemaker   [] MI/Heart Problems [] Cancer [x] Arthritis [] Asthma                         [x] refer to full medical chart  In EPIC  [x] Other:  Lumbar decompression and fusion L3-L5 2021      Comorbidities:   [] Obesity [] Dialysis  [] Other:   [] Asthma/COPD [] Dementia [] Other:   [] Stroke [] Sleep apnea [] Other:   [] Vascular disease [] Rheumatic disease [] Other:     Tests: [] X-Ray: [] MRI:  [x] Other: CT 24  IMPRESSION:  1. Posterior fusion spanning L3-L5 without CT complication.  2. Interbody spacer at L4-5 with bony ankylosis spanning in the disc space.  3. Mild to moderate bilateral neural foraminal stenosis at L3-4.  4. Multiple nonobstructing nephroliths within the bilateral kidneys.    Medications: [x] Refer to full medical record [] None [] Other:  Allergies:      [x] Refer to full medical record  [] None [] Other:    Function:      ADL/IADL [x] Previously independent with all [x] Currently independent with all Who currently assists the patient with task/Comments    [] Previously independent with all except: []

## 2024-08-16 ENCOUNTER — HOSPITAL ENCOUNTER (OUTPATIENT)
Dept: PHYSICAL THERAPY | Age: 64
Setting detail: THERAPIES SERIES
Discharge: HOME | End: 2024-08-16
Payer: COMMERCIAL

## 2024-08-16 PROCEDURE — 97110 THERAPEUTIC EXERCISES: CPT

## 2024-08-16 NOTE — FLOWSHEET NOTE
[facility]   Outpatient Rehabilitation & Therapy  [address]  P: [phone]   F: [phone]    Physical Therapy Daily Treatment Note      Date:  2024  Patient Name:  Dewayne Rob    :  1960  MRN: [id number]  Physician: Sunitha Ribeiro APRN - CNP                             Insurance: [facility] ([employer]OMN, Auth after 30)  Medical Diagnosis:   M47.816 (ICD-10-CM) - Lumbar spondylosis   Z98.1 (ICD-10-CM) - History of lumbar fusion   Rehab Codes: M54.50 Low back pain, M62.81 weakness   Onset Date: Chronic                                 Next 's appt: 24  Visit# / total visits: 2/10  Cancels/No Shows: 0/0    Subjective:    Patient reports feeling about the same since initial eval and he reports that he has been performing his home exercises at home.   Pain:  [x] Yes  [] No Location: Low back  Pain Rating: (0-10 scale) 8/10  Pain altered Tx:  [x] No  [] Yes  Action:  Comments:    Objective:  Modalities:   Precautions:  Exercises:  Exercise Reps/ Time Weight/ Level Completed  Today Comments   SKTC 3x15\"  x    Sciatic nerve glide x12ea  x    LTR 10xea  x    Bridge x12  x    TA activation 10x3\"  x                  Palloff press 2x10 Green x    Rows/Ext 2x10 Green x    Step ups fwd/lateral x10ea 8' x    3 way hip 2x10 Red x    SLS 3x15\"  x    Minisquats x10  x    Other:    Specific Instructions for next treatment: Core activation, hip strengthening       Assessment: [x] Progressing toward goals. Initial visit after evaluation. Patient demonstrates good carryover with exercises today. Incorporated additional core strengthening and hip strengthening today without significant increase in pain. Patient education of delayed onset muscle soreness. Will continue to progress as tolerated and monitor symptoms. Patient given updated HEP with Red and Green theraband.     [] No change.     [] Other:    [x] Patient would continue to benefit from skilled physical therapy services in order to: decrease

## 2024-08-20 ENCOUNTER — HOSPITAL ENCOUNTER (OUTPATIENT)
Dept: PHYSICAL THERAPY | Age: 64
Setting detail: THERAPIES SERIES
Discharge: HOME | End: 2024-08-20
Payer: COMMERCIAL

## 2024-08-20 PROCEDURE — 97110 THERAPEUTIC EXERCISES: CPT

## 2024-08-20 NOTE — FLOWSHEET NOTE
[facility]   Outpatient Rehabilitation & Therapy  [address]  P: [phone]   F: [phone]    Physical Therapy Daily Treatment Note      Date:  2024  Patient Name:  Dewayne Rob    :  1960  MRN: [id number]  Physician: Sunitha Ribeiro, APRN - CNP                             Insurance: [facility] ([employer]OMN, Auth after 30)  Medical Diagnosis:   M47.816 (ICD-10-CM) - Lumbar spondylosis   Z98.1 (ICD-10-CM) - History of lumbar fusion   Rehab Codes: M54.50 Low back pain, M62.81 weakness   Onset Date: Chronic                                 Next 's appt: 24  Visit# / total visits: 5/10  Cancels/No Shows: 0/0    Subjective:    Patient reports that he is feeling a little better than last time but he notes that the pain is still constant.   Pain:  [x] Yes  [] No Location: Low back  Pain Rating: (0-10 scale) 5/10  Pain altered Tx:  [x] No  [] Yes  Action:  Comments:    Objective:  Modalities:   Precautions:  Exercises:  Exercise Reps/ Time Weight/ Level Completed  Today Comments   SKTC 3x15\"      Sciatic nerve glide x12ea      LTR 10xea  x    Bridge 2x10  x    TA activation 10x3\"  x    Piriformis stretch 3x15\"  x    Open books x10ea  x           Palloff press 2x10 Green x    Rows/Ext 2x10 Green x    Palloff walk outs x5ea Green x    Step ups fwd/lateral x10ea 8' x    3 way hip 2x10 Green x    SLS 3x15\"  x    Minisquats 2x10  x    RDL 2x10  x    Other:    Specific Instructions for next treatment: Core activation, hip strengthening       Assessment: [x] Progressing toward goals.   Continued with exercises as charted above with the addition of walk outs, RDL for additional strengthening. Patient noting tightness in B hips after exercises, added piriformis stretch and open books to reduce tightness. Patient educated on continuing with exercises at home. Will continue to progress as tolerated.     [] No change.     [] Other:    [x] Patient would continue to benefit from skilled physical therapy

## 2024-08-30 ENCOUNTER — HOSPITAL ENCOUNTER (OUTPATIENT)
Dept: PHYSICAL THERAPY | Age: 64
Setting detail: THERAPIES SERIES
Discharge: HOME | End: 2024-08-30
Payer: COMMERCIAL

## 2024-08-30 PROCEDURE — 97110 THERAPEUTIC EXERCISES: CPT

## 2024-08-30 NOTE — FLOWSHEET NOTE
[facility]   Outpatient Rehabilitation & Therapy  [address]  P: [phone]   F: [phone]    Physical Therapy Daily Treatment Note      Date:  2024  Patient Name:  Dewayne Rob    :  1960  MRN: [id number]  Physician: Sunitha Ribeiro, APRN - CNP                             Insurance: [facility] ([employer], Auth after 30)  Medical Diagnosis:   M47.816 (ICD-10-CM) - Lumbar spondylosis   Z98.1 (ICD-10-CM) - History of lumbar fusion   Rehab Codes: M54.50 Low back pain, M62.81 weakness   Onset Date: Chronic                                 Next 's appt: 24  Visit# / total visits: 4/10  Cancels/No Shows: 0/0    Subjective:    Patient reports feeling about the same since last visit. No significant improvement.   Pain:  [x] Yes  [] No Location: Low back  Pain Rating: (0-10 scale) 6/10  Pain altered Tx:  [x] No  [] Yes  Action:  Comments:    Objective:  Modalities:   Precautions:  Exercises:  Exercise Reps/ Time Weight/ Level Completed  Today Comments   SKTC 3x15\"      Sciatic nerve glide x12ea      LTR 10xea  x    Bridge 2x10 Green x    TA activation 10x3\"      Piriformis stretch 3x15\"  x    Open books x10ea  x    Dead bug x10ea  x           Palloff press 2x10 Green     Rows/Ext 2x10 Blue x    Palloff walk outs x10ea Green x    Step ups fwd/lateral 2x10ea 8' x    3 way hip 2x10 Green x    SLS 3x15\"  x    Minisquats 2x10  x    RDL 2x10 10# x    Other:    Specific Instructions for next treatment: Core activation, hip strengthening       Assessment: [x] Progressing toward goals.   Patient noting some Low back pain after RDLs. Went to supine exercises after that with the addition of dead bugs to work on core engagement. Patient noting after supine exercises a decrease in tightness and discomfort when leaving therapy.       [] No change.     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      Goals: (to be met in 10 treatments)  ? Pain:4/10 or less low back pain to improve ability with walking endurance  ? ROM: Right rotation to improve ability with push and pulling activity including with woodworking  ? Strength:Left hip to 4+/5 or better to improve dynamic stability and reduce strain onto low back with activity  ? Function: Mod Oswestry to 22% impairment or less to improve ability with ADLs and IADLs  Independent with Home Exercise Programs  Patient will report being able to walk for 45 minutes or greater to improve ability to walk throughout the community.   Patient will be able to box lift 20# from floor to waist to improve forward flexion and gross LE strength     Pt. Education:  [x] Yes  [] No  [x] Reviewed Prior HEP/Ed  Method of Education: [x] Verbal  [x] Demo  [] Written  Comprehension of Education:  [x] Verbalizes understanding.  [x] Demonstrates understanding.  [] Needs review.  [x] Demonstrates/verbalizes HEP/Ed previously given.    Access Code: [email or web address]  URL: [email or web address]  Date: 08/16/2024  Prepared by: John Alfonso    Exercises  - Hooklying Single Knee to Chest Stretch  - 1 x daily - 7 x weekly - 3 sets - 15-30 seconds hold  - Supine Sciatic Nerve Glide  - 1 x daily - 7 x weekly - 1-3 sets - 5-10 reps  - Supine Lower Trunk Rotation  - 1 x daily - 7 x weekly - 1-3 sets - 10 reps  - Supine Transversus Abdominis Bracing - Hands on Stomach  - 1 x daily - 7 x weekly - 2-3 sets - 10 reps  - Supine Bridge  - 1 x daily - 7 x weekly - 1-3 sets - 10 reps  - Standing Anti-Rotation Press with Anchored Resistance  - 1 x daily - 7 x weekly - 1-3 sets - 10 reps  - Standing Hip Flexion with Resistance Loop  - 1 x daily - 7 x weekly - 1-3 sets - 10 reps  - Hip Abduction with Resistance Loop  - 1 x daily - 7 x weekly - 1-3 sets - 10 reps  - Hip Extension with

## 2024-09-03 ENCOUNTER — HOSPITAL ENCOUNTER (OUTPATIENT)
Dept: PHYSICAL THERAPY | Age: 64
Setting detail: THERAPIES SERIES
Discharge: HOME | End: 2024-09-03
Payer: COMMERCIAL

## 2024-09-03 PROCEDURE — 97110 THERAPEUTIC EXERCISES: CPT

## 2024-09-03 NOTE — THERAPY DISCHARGE
[facility]   Outpatient Rehabilitation & Therapy  [address]  P: [phone]   F: [phone]    Physical Therapy Daily Progress and discharge Note      Date:  9/3/2024  Patient Name:  Dewayne Rob    :  1960  MRN: [id number]  Physician: Sunitha Ribeiro, APRN - CNP                             Insurance: [facility] ([employer], Auth after 30)  Medical Diagnosis:   M47.816 (ICD-10-CM) - Lumbar spondylosis   Z98.1 (ICD-10-CM) - History of lumbar fusion   Rehab Codes: M54.50 Low back pain, M62.81 weakness   Onset Date: Chronic                                 Next 's appt: 24  Visit# / total visits: 5/10  Cancels/No Shows: 0/0    Subjective:    Patient reports feeling about the same since starting therapy. He walks a mile with his wife before he has to sit down.        Pain:  [x] Yes  [] No Location: Low back  Pain Rating: (0-10 scale) 6/10  Pain altered Tx:  [x] No  [] Yes  Action:  Comments:    Objective:  Modalities:   Precautions:  Exercises:  Exercise Reps/ Time Weight/ Level Completed  Today Comments   SKTC 3x15\"      Sciatic nerve glide x12ea      LTR 10xea      Bridge 2x10 Green     TA activation 10x3\"      Piriformis stretch 3x15\"      Open books x10ea      Dead bug x10ea             Palloff press 2x10 Green     Rows/Ext 2x10 Blue     Palloff walk outs x10ea Green     Step ups fwd/lateral 2x10ea 8'     3 way hip 2x10 Green     SLS 3x15\"      Minisquats 2x10      RDL 2x10 10#     Other:    Objective: p = pain, L = Lacks, WNL=within normal limits                 ROM  ° A/P STRENGTH   ROM     Left Right Left Right                 Lumbar               Flexion 75%   Hip Flexion      4/5 5/5 Extension 75%   Ext     4/5 5/5 Rotation L90% R75%    Abd     4/5 5/5 Sidebend L 75% R90%   Add     4+/5 5/5 -------------------- --------- --------                                       Knee Flex     4/5 5/5         Ext      4/5 5/5         Ankle DF     4/5 5/5         PF              Inversion

## 2024-10-02 ENCOUNTER — OFFICE VISIT (OUTPATIENT)
Dept: NEUROSURGERY | Age: 64
End: 2024-10-02
Payer: COMMERCIAL

## 2024-10-02 VITALS
HEART RATE: 52 BPM | SYSTOLIC BLOOD PRESSURE: 117 MMHG | HEIGHT: 67 IN | WEIGHT: 195 LBS | BODY MASS INDEX: 30.61 KG/M2 | DIASTOLIC BLOOD PRESSURE: 73 MMHG

## 2024-10-02 DIAGNOSIS — M47.816 LUMBAR FACET ARTHROPATHY: ICD-10-CM

## 2024-10-02 DIAGNOSIS — M47.816 LUMBAR SPONDYLOSIS: Primary | ICD-10-CM

## 2024-10-02 DIAGNOSIS — Z98.1 HISTORY OF LUMBAR FUSION: ICD-10-CM

## 2024-10-02 PROCEDURE — 99214 OFFICE O/P EST MOD 30 MIN: CPT | Performed by: NURSE PRACTITIONER

## 2024-10-02 NOTE — PROGRESS NOTES
L5.  Straightening of the  expected cervical lumbar lordosis..     Disc spaces: Interbody spacer at L4-5 with bony ankylosis spanning in the  disc space.  Mild disc space height loss at L2-3 with ossification of the  disc.     Posterior Elements: Posterior fusion with transpedicular screws and meredith  fixation spanning L3-L5.  No perihardware lucency or fracture.  Mild to  moderate bilateral neural foraminal stenosis at L3-4 secondary to  circumferential disc bulging and facet degeneration..     Soft Tissues: Multiple nonobstructing nephroliths throughout the left kidney  the largest of which measures 2 mm.  2-3 mm nonobstructing nephrolith within  the inferior pole right kidney.  Scattered atherosclerotic calcifications of  the aorta and branch vessels..    X-ray lumbar 7/23/2024:  FINDINGS:  Lumbar spine alignment anatomic.  Hardware posteriorly fuses L3-4 L4-5.  No  radiographic evidence of hardware failure or loosening.  No abnormal motion  with flexion or extension.  No acute osseous abnormality.  Vertebral body  axial heights maintained.  Moderatedegenerative change throughout most  significant L4-5 L5-S1 with loss of disc height and endplate spondylosis.  Moderatefacet arthropathy lower lumbar spine.    Physical therapy notes reviewed    Assessment and Plan:     1. Lumbar spondylosis    2. History of lumbar fusion    3. Lumbar facet arthropathy         Plan: Updated images reviewed with patient.  Continues to have constant predominantly axial pain despite conservative measures and course of physical therapy followed by home exercises.  Would recommend proceeding with lumbar MRI at this time for further evaluation.  Patient has gone through pain management interventions in the past, does not feel that they were very helpful and is reluctant to return at this time.  Will have patient follow-up with Dr. Simpson after MRI imaging for review and recommendations.    Followup: Return in about 4 weeks (around

## 2024-10-10 ENCOUNTER — HOSPITAL ENCOUNTER (OUTPATIENT)
Dept: MRI IMAGING | Age: 64
Discharge: HOME | End: 2024-10-12
Payer: COMMERCIAL

## 2024-10-10 DIAGNOSIS — Z98.1 HISTORY OF LUMBAR FUSION: ICD-10-CM

## 2024-10-10 DIAGNOSIS — M47.816 LUMBAR SPONDYLOSIS: ICD-10-CM

## 2024-10-10 PROCEDURE — 72148 MRI LUMBAR SPINE W/O DYE: CPT

## 2024-10-30 ENCOUNTER — HOSPITAL ENCOUNTER (OUTPATIENT)
Age: 64
Discharge: HOME | End: 2024-11-01
Payer: COMMERCIAL

## 2024-10-30 ENCOUNTER — OFFICE VISIT (OUTPATIENT)
Dept: NEUROSURGERY | Age: 64
End: 2024-10-30
Payer: COMMERCIAL

## 2024-10-30 ENCOUNTER — HOSPITAL ENCOUNTER (OUTPATIENT)
Dept: GENERAL RADIOLOGY | Age: 64
Discharge: HOME | End: 2024-11-01
Payer: COMMERCIAL

## 2024-10-30 VITALS
BODY MASS INDEX: 28.79 KG/M2 | HEART RATE: 64 BPM | WEIGHT: 190 LBS | SYSTOLIC BLOOD PRESSURE: 123 MMHG | DIASTOLIC BLOOD PRESSURE: 73 MMHG | HEIGHT: 68 IN

## 2024-10-30 DIAGNOSIS — Z98.1 ADJACENT SEGMENT DISEASE OF LUMBAR SPINE WITH HISTORY OF FUSION PROCEDURE: Primary | ICD-10-CM

## 2024-10-30 DIAGNOSIS — M51.369 ADJACENT SEGMENT DISEASE OF LUMBAR SPINE WITH HISTORY OF FUSION PROCEDURE: Primary | ICD-10-CM

## 2024-10-30 DIAGNOSIS — Z98.1 ADJACENT SEGMENT DISEASE OF LUMBAR SPINE WITH HISTORY OF FUSION PROCEDURE: ICD-10-CM

## 2024-10-30 DIAGNOSIS — M51.369 ADJACENT SEGMENT DISEASE OF LUMBAR SPINE WITH HISTORY OF FUSION PROCEDURE: ICD-10-CM

## 2024-10-30 PROCEDURE — 99213 OFFICE O/P EST LOW 20 MIN: CPT | Performed by: NEUROLOGICAL SURGERY

## 2024-10-30 PROCEDURE — 72082 X-RAY EXAM ENTIRE SPI 2/3 VW: CPT

## 2024-10-30 NOTE — PROGRESS NOTES
reactive to light  Extraocular motion intact  Face symmetric  No dysarthria  v1-3 sensation symmetric, masseter tone symmetric  Hearing symmetric and intact to finger rub    Sensation:   Distal L foot numbness    Motor  L deltoid 5/5; R deltoid 5/5  L biceps 5/5; R biceps 5/5  L triceps 5/5; R triceps 5/5  L wrist extension 5/5; R wrist extension 5/5  L intrinsics 5/5; R intrinsics 5/5     L iliopsoas 5/5 , R iliopsoas 5/5  L quadriceps 5/5; R quadriceps 5/5  L Dorsiflexion 5/5; R dorsiflexion 5/5  L Plantarflexion 5/5; R plantarflexion 5/5  L EHL 5/5; R EHL 5/5    Reflexes  L Brachioradialis 2+/4; R brachioradialis 2+/4  L Biceps 2+/4; R Biceps 2+/4  L Triceps 2+/4; R Triceps 2+/4  L Patellar 2+/4: R Patellar 2+/4  L Achilles 2+/4; R Achilles 2+/4    hoffmans L: neg  hoffmans R: neg  Clonus L: neg  Clonus R: neg  Babinski L: up  Babinski R; up    Studies Review:     Mri L minimal facet effusion at L2-L3 with some mild stenosis and flavum hypertrophy with broad-based protrusion present.  This overall appears relatively similar to the preoperative MRI that was done back in 2021.    CT does show solid osseous fusion across the facet joints from L3 down to L5 bilaterally with no evidence of hardware malfunction    Flexion-extension x-rays without any gross instability at the adjacent level at other levels.    Assessment and Plan:      1. Adjacent segment disease of lumbar spine with history of fusion procedure         Assessment & Plan Plan: Extensive discussion with the patient regarding the findings on examination and the 3 most common culprits in terms of axial pain being pseudoarthrosis, sagittal plane deformity, adjacent level disease.  Pseudoarthrosis has been ruled out based on CT showing solid osseous fusion so we would get full spine x-rays to evaluate for deformity as well as consider a medial branch block at the adjacent L2-3 level with ablation as appropriate as a diagnostic measure.     Scoliosis

## 2024-11-18 ENCOUNTER — INITIAL CONSULT (OUTPATIENT)
Dept: PAIN MANAGEMENT | Age: 64
End: 2024-11-18
Payer: COMMERCIAL

## 2024-11-18 VITALS — HEIGHT: 68 IN | BODY MASS INDEX: 28.79 KG/M2 | WEIGHT: 190 LBS

## 2024-11-18 DIAGNOSIS — M47.817 LUMBOSACRAL SPONDYLOSIS WITHOUT MYELOPATHY: Primary | ICD-10-CM

## 2024-11-18 DIAGNOSIS — M96.1 POSTLAMINECTOMY SYNDROME OF LUMBAR REGION: ICD-10-CM

## 2024-11-18 PROCEDURE — 99204 OFFICE O/P NEW MOD 45 MIN: CPT | Performed by: PAIN MEDICINE

## 2024-11-18 ASSESSMENT — ENCOUNTER SYMPTOMS: BACK PAIN: 1

## 2024-11-18 NOTE — PROGRESS NOTES
performed by Daniel Pritchard MD at [facility] OR    BACK SURGERY      Had 3    COLONOSCOPY  2012    LITHOTRIPSY  08/2018    LUMBAR FUSION  04/15/2021    L3-L5  POSTERIOR LUMBAR FUSION    LUMBAR FUSION N/A 4/15/2021    L3-L5  POSTERIOR LUMBAR FUSION performed by Matt Simpson DO at [facility] OR    LUMBAR LAMINECTOMY      laminectomy X2 and Spinal Cord stimulartor implant    NERVE BLOCK Bilateral 08/21/2020    NERVE BLOCK - MBB #2 L3-4 L4-5 performed by Daniel Pritchard MD at [facility] OR    PAIN MANAGEMENT PROCEDURE Right 09/04/2020    NERVE RADIOFREQUENCY ABLATION - L3-4, L4-5 performed by Daniel Pritchard MD at [facility] OR    PAIN MANAGEMENT PROCEDURE Left 09/11/2020    NERVE RADIOFREQUENCY ABLATION L3-4, L4-5 performed by Daniel Pritchard MD at [facility] OR    SPINAL CORD STIMULATOR SURGERY  01/30/2020    removal    SPINAL CORD STIMULATOR SURGERY N/A 01/30/2020    SPINAL CORD STIMULATOR IMPLANT REMOVAL, performed by Matt Simpson DO at [facility] OR    URETER STENT PLACEMENT         Allergies   Allergen Reactions    Morphine Other (See Comments)     Patient developed suicidal ideations    Fentanyl Hives         Current Outpatient Medications:     pregabalin (LYRICA) 150 MG capsule, TAKE ONE CAPSULE BY MOUTH TWICE A DAY FOR 90 DAYS - MAX DAILY AMOUNT: 2 CAPSULES, Disp: 180 capsule, Rfl: 3    ibuprofen (ADVIL;MOTRIN) 800 MG tablet, Take 1 tablet by mouth every 12 hours as needed for Pain, Disp: 360 tablet, Rfl: 0    lisinopril (PRINIVIL;ZESTRIL) 5 MG tablet, Take 1 tablet by mouth daily, Disp: 90 tablet, Rfl: 3    metFORMIN (GLUCOPHAGE-XR) 500 MG extended release tablet, Take 1 tablet by mouth daily (with breakfast), Disp: 90 tablet, Rfl: 3    ondansetron (ZOFRAN) 4 MG tablet, TAKE ONE TABLET BY MOUTH EVERY 8 HOURS AS NEEDED FOR NAUSEA, Disp: 90 tablet, Rfl: 0    Handicap Placard MISC, It is my medical opinion that Dewayne Rob requires a disability parking placard for the following reasons: He

## 2024-11-19 ENCOUNTER — HOSPITAL ENCOUNTER (OUTPATIENT)
Age: 64
Discharge: HOME | End: 2024-11-19
Payer: COMMERCIAL

## 2024-11-19 DIAGNOSIS — M96.1 POSTLAMINECTOMY SYNDROME OF LUMBAR REGION: ICD-10-CM

## 2024-11-19 LAB
CRP SERPL HS-MCNC: <3 MG/L (ref 0–5)
ERYTHROCYTE [SEDIMENTATION RATE] IN BLOOD BY PHOTOMETRIC METHOD: 4 MM/HR (ref 0–20)

## 2024-11-19 PROCEDURE — 86140 C-REACTIVE PROTEIN: CPT

## 2024-11-19 PROCEDURE — 85652 RBC SED RATE AUTOMATED: CPT

## 2024-11-19 PROCEDURE — 36415 COLL VENOUS BLD VENIPUNCTURE: CPT

## 2024-12-04 ENCOUNTER — TELEPHONE (OUTPATIENT)
Dept: PAIN MANAGEMENT | Age: 64
End: 2024-12-04

## 2024-12-04 NOTE — TELEPHONE ENCOUNTER
Pt wife calling today stating that the pt has completed the lab work ordered by Dr Pritchard and would like to know next steps.     Please advise.

## 2024-12-21 ENCOUNTER — HOSPITAL ENCOUNTER (OUTPATIENT)
Age: 64
Discharge: HOME | End: 2024-12-21
Payer: COMMERCIAL

## 2024-12-21 DIAGNOSIS — E11.9 TYPE 2 DIABETES MELLITUS WITHOUT COMPLICATION, WITHOUT LONG-TERM CURRENT USE OF INSULIN (HCC): ICD-10-CM

## 2024-12-21 LAB
ALBUMIN SERPL-MCNC: 4.3 G/DL (ref 3.5–5.2)
ALP SERPL-CCNC: 71 U/L (ref 40–129)
ALT SERPL-CCNC: 22 U/L (ref 10–50)
ANION GAP SERPL CALCULATED.3IONS-SCNC: 11 MMOL/L (ref 9–16)
AST SERPL-CCNC: 20 U/L (ref 10–50)
BASOPHILS # BLD: 0 K/UL (ref 0–0.2)
BASOPHILS NFR BLD: 0 % (ref 0–2)
BILIRUB SERPL-MCNC: 0.4 MG/DL (ref 0–1.2)
BUN SERPL-MCNC: 18 MG/DL (ref 8–23)
CALCIUM SERPL-MCNC: 9.7 MG/DL (ref 8.6–10.4)
CHLORIDE SERPL-SCNC: 104 MMOL/L (ref 98–107)
CHOLEST SERPL-MCNC: 168 MG/DL (ref 0–199)
CHOLESTEROL/HDL RATIO: 3.4
CO2 SERPL-SCNC: 25 MMOL/L (ref 20–31)
CREAT SERPL-MCNC: 0.9 MG/DL (ref 0.7–1.2)
CREAT UR-MCNC: 115 MG/DL (ref 39–259)
EOSINOPHIL # BLD: 0.2 K/UL (ref 0–0.4)
EOSINOPHILS RELATIVE PERCENT: 3 % (ref 0–4)
ERYTHROCYTE [DISTWIDTH] IN BLOOD BY AUTOMATED COUNT: 12.9 % (ref 11.5–14.9)
GFR, ESTIMATED: >90 ML/MIN/1.73M2
GLUCOSE SERPL-MCNC: 156 MG/DL (ref 74–99)
HCT VFR BLD AUTO: 47.7 % (ref 41–53)
HDLC SERPL-MCNC: 50 MG/DL
HGB BLD-MCNC: 16.2 G/DL (ref 13.5–17.5)
LDLC SERPL CALC-MCNC: 99 MG/DL (ref 0–100)
LYMPHOCYTES NFR BLD: 1.8 K/UL (ref 1–4.8)
LYMPHOCYTES RELATIVE PERCENT: 28 % (ref 24–44)
MCH RBC QN AUTO: 32.4 PG (ref 26–34)
MCHC RBC AUTO-ENTMCNC: 34 G/DL (ref 31–37)
MCV RBC AUTO: 95.2 FL (ref 80–100)
MICROALBUMIN UR-MCNC: <12 MG/L (ref 0–20)
MICROALBUMIN/CREAT UR-RTO: NORMAL MCG/MG CREAT (ref 0–17)
MONOCYTES NFR BLD: 0.7 K/UL (ref 0.1–1.3)
MONOCYTES NFR BLD: 10 % (ref 1–7)
NEUTROPHILS NFR BLD: 59 % (ref 36–66)
NEUTS SEG NFR BLD: 3.9 K/UL (ref 1.3–9.1)
PLATELET # BLD AUTO: 220 K/UL (ref 150–450)
PMV BLD AUTO: 7.6 FL (ref 6–12)
POTASSIUM SERPL-SCNC: 4.7 MMOL/L (ref 3.7–5.3)
PROT SERPL-MCNC: 7.2 G/DL (ref 6.6–8.7)
RBC # BLD AUTO: 5.01 M/UL (ref 4.5–5.9)
SODIUM SERPL-SCNC: 140 MMOL/L (ref 136–145)
TRIGL SERPL-MCNC: 97 MG/DL (ref 0–149)
WBC OTHER # BLD: 6.7 K/UL (ref 3.5–11)

## 2024-12-21 PROCEDURE — 85025 COMPLETE CBC W/AUTO DIFF WBC: CPT

## 2024-12-21 PROCEDURE — 80053 COMPREHEN METABOLIC PANEL: CPT

## 2024-12-21 PROCEDURE — 80061 LIPID PANEL: CPT

## 2024-12-21 PROCEDURE — 82043 UR ALBUMIN QUANTITATIVE: CPT

## 2024-12-21 PROCEDURE — 82570 ASSAY OF URINE CREATININE: CPT

## 2024-12-21 PROCEDURE — 36415 COLL VENOUS BLD VENIPUNCTURE: CPT

## 2025-07-15 ENCOUNTER — INITIAL CONSULT (OUTPATIENT)
Dept: BARIATRICS/WEIGHT MGMT | Age: 65
End: 2025-07-15
Payer: COMMERCIAL

## 2025-07-15 VITALS
BODY MASS INDEX: 28.49 KG/M2 | HEIGHT: 68 IN | WEIGHT: 188 LBS | OXYGEN SATURATION: 98 % | SYSTOLIC BLOOD PRESSURE: 128 MMHG | HEART RATE: 81 BPM | DIASTOLIC BLOOD PRESSURE: 74 MMHG

## 2025-07-15 DIAGNOSIS — E11.9 TYPE 2 DIABETES MELLITUS WITHOUT COMPLICATION, WITHOUT LONG-TERM CURRENT USE OF INSULIN (HCC): ICD-10-CM

## 2025-07-15 DIAGNOSIS — L02.413 ABSCESS OF SKIN OF RIGHT SHOULDER: Primary | ICD-10-CM

## 2025-07-15 PROCEDURE — 3051F HG A1C>EQUAL 7.0%<8.0%: CPT | Performed by: NURSE PRACTITIONER

## 2025-07-15 PROCEDURE — 99214 OFFICE O/P EST MOD 30 MIN: CPT | Performed by: NURSE PRACTITIONER

## 2025-07-15 PROCEDURE — 10060 I&D ABSCESS SIMPLE/SINGLE: CPT | Performed by: NURSE PRACTITIONER

## 2025-07-15 RX ORDER — SULFAMETHOXAZOLE AND TRIMETHOPRIM 800; 160 MG/1; MG/1
1 TABLET ORAL 2 TIMES DAILY
Qty: 20 TABLET | Refills: 0 | Status: SHIPPED | OUTPATIENT
Start: 2025-07-15 | End: 2025-07-25

## 2025-07-15 RX ORDER — CEPHALEXIN 500 MG/1
500 CAPSULE ORAL 4 TIMES DAILY
Qty: 40 CAPSULE | Refills: 0 | Status: SHIPPED | OUTPATIENT
Start: 2025-07-15 | End: 2025-07-25

## 2025-07-15 RX ORDER — LIDOCAINE HYDROCHLORIDE 10 MG/ML
1.5 INJECTION, SOLUTION INFILTRATION; PERINEURAL ONCE
Status: SHIPPED | OUTPATIENT
Start: 2025-07-15

## 2025-07-15 ASSESSMENT — ENCOUNTER SYMPTOMS
COLOR CHANGE: 1
ROS SKIN COMMENTS: + CYST

## 2025-07-15 NOTE — PROGRESS NOTES
[facility] INVASIVE BARIATRIC SURG  [address]  [address]  [place] [id number]  Dept: [phone]    ROBOTIC AND MINIMALLY INVASIVE SURGERY  PROGRESS NOTE     Chief Complaint   Patient presents with    New Patient    Consultation     Cyst on right shoulder         Patient: Dewayne Rob      Service Date: 7/15/2025  Visit:   Gen surgery evaluation/abscess    Medical Record #: [id number]    History: 65 y.o. male  presents to the office today with his wife to discuss an abscess/cyst on the right posterior shoulder.  Patient states that it has been there for about a year.  He states it was previously about the size of half of a golf ball.  It was not bothersome until last week.  His wife was evaluating the area and pushed on the center.  After she put some pressure on the center of the cyst she felt a pop internally and patient started to notice increased pain in the area.  Along with increased pain he started noticing the area was warm to touch and red.  He saw PCP office on Friday and was started on doxycycline.  He has taken 4 days of doxycycline 100 mg twice a day and has noticed any improvement.  Area is still painful, redness and warmth have not improved.  Pain seems to be worsening and he is experiencing discomfort under the skin and up into the trapezius area of the posterior shoulder.  He has been attempting warm compresses at home without relief.  He uses ibuprofen as needed for pain and that has not been helpful either.  He denies any lymph node swelling, no fever, chills, nausea, vomiting.    Review of Systems:     Review of Systems   Constitutional:  Negative for appetite change, chills, diaphoresis, fatigue and fever.   Skin:  Positive for color change.        + cyst     Hematological:  Negative for adenopathy.   All other systems reviewed and are negative.      Physical Assessment:     /74   Pulse 81   Ht 1.727 m (5' 7.99\")   Wt 85.3

## 2025-07-23 ENCOUNTER — HOSPITAL ENCOUNTER (OUTPATIENT)
Dept: CT IMAGING | Age: 65
Discharge: HOME | End: 2025-07-25
Payer: COMMERCIAL

## 2025-07-23 DIAGNOSIS — L02.413 ABSCESS OF SKIN OF RIGHT SHOULDER: ICD-10-CM

## 2025-07-23 LAB
EGFR, POC: 75 ML/MIN/1.73M2
POC CREATININE: 1.1 MG/DL (ref 0.51–1.19)

## 2025-07-23 PROCEDURE — 2500000003 HC RX 250 WO HCPCS: Performed by: NURSE PRACTITIONER

## 2025-07-23 PROCEDURE — 73201 CT UPPER EXTREMITY W/DYE: CPT

## 2025-07-23 PROCEDURE — 6360000004 HC RX CONTRAST MEDICATION: Performed by: NURSE PRACTITIONER

## 2025-07-23 PROCEDURE — 82565 ASSAY OF CREATININE: CPT

## 2025-07-23 PROCEDURE — 2580000003 HC RX 258: Performed by: NURSE PRACTITIONER

## 2025-07-23 RX ORDER — IOPAMIDOL 755 MG/ML
75 INJECTION, SOLUTION INTRAVASCULAR
Status: COMPLETED | OUTPATIENT
Start: 2025-07-23 | End: 2025-07-23

## 2025-07-23 RX ORDER — 0.9 % SODIUM CHLORIDE 0.9 %
100 INTRAVENOUS SOLUTION INTRAVENOUS ONCE
Status: COMPLETED | OUTPATIENT
Start: 2025-07-23 | End: 2025-07-23

## 2025-07-23 RX ORDER — SODIUM CHLORIDE 0.9 % (FLUSH) 0.9 %
10 SYRINGE (ML) INJECTION PRN
Status: COMPLETED | OUTPATIENT
Start: 2025-07-23 | End: 2025-07-23

## 2025-07-23 RX ADMIN — SODIUM CHLORIDE 100 ML: 9 INJECTION, SOLUTION INTRAVENOUS at 07:25

## 2025-07-23 RX ADMIN — SODIUM CHLORIDE, PRESERVATIVE FREE 10 ML: 5 INJECTION INTRAVENOUS at 07:25

## 2025-07-23 RX ADMIN — IOPAMIDOL 75 ML: 755 INJECTION, SOLUTION INTRAVENOUS at 07:25

## 2025-08-01 ENCOUNTER — OFFICE VISIT (OUTPATIENT)
Dept: BARIATRICS/WEIGHT MGMT | Age: 65
End: 2025-08-01
Payer: COMMERCIAL

## 2025-08-01 VITALS
OXYGEN SATURATION: 98 % | BODY MASS INDEX: 28.49 KG/M2 | WEIGHT: 188 LBS | HEIGHT: 68 IN | DIASTOLIC BLOOD PRESSURE: 62 MMHG | SYSTOLIC BLOOD PRESSURE: 118 MMHG | HEART RATE: 71 BPM

## 2025-08-01 DIAGNOSIS — R22.31 MASS OF SKIN OF RIGHT SHOULDER: Primary | ICD-10-CM

## 2025-08-01 PROCEDURE — 99214 OFFICE O/P EST MOD 30 MIN: CPT | Performed by: SURGERY

## 2025-08-11 ENCOUNTER — HOSPITAL ENCOUNTER (OUTPATIENT)
Dept: MRI IMAGING | Facility: CLINIC | Age: 65
Discharge: HOME | End: 2025-08-13
Payer: COMMERCIAL

## 2025-08-11 DIAGNOSIS — R22.31 MASS OF SKIN OF RIGHT SHOULDER: ICD-10-CM

## 2025-08-11 PROCEDURE — A9579 GAD-BASE MR CONTRAST NOS,1ML: HCPCS | Performed by: SURGERY

## 2025-08-11 PROCEDURE — 2500000003 HC RX 250 WO HCPCS: Performed by: SURGERY

## 2025-08-11 PROCEDURE — 73223 MRI JOINT UPR EXTR W/O&W/DYE: CPT

## 2025-08-11 PROCEDURE — 6360000004 HC RX CONTRAST MEDICATION: Performed by: SURGERY

## 2025-08-11 RX ORDER — GADOTERIDOL 279.3 MG/ML
20 INJECTION INTRAVENOUS
Status: COMPLETED | OUTPATIENT
Start: 2025-08-11 | End: 2025-08-11

## 2025-08-11 RX ORDER — SODIUM CHLORIDE 0.9 % (FLUSH) 0.9 %
10 SYRINGE (ML) INJECTION PRN
Status: COMPLETED | OUTPATIENT
Start: 2025-08-11 | End: 2025-08-11

## 2025-08-11 RX ADMIN — SODIUM CHLORIDE, PRESERVATIVE FREE 10 ML: 5 INJECTION INTRAVENOUS at 11:29

## 2025-08-11 RX ADMIN — GADOTERIDOL 20 ML: 279.3 INJECTION, SOLUTION INTRAVENOUS at 11:29

## 2025-08-14 ENCOUNTER — TELEPHONE (OUTPATIENT)
Dept: BARIATRICS/WEIGHT MGMT | Age: 65
End: 2025-08-14

## 2025-08-14 RX ORDER — CEPHALEXIN 500 MG/1
500 CAPSULE ORAL 3 TIMES DAILY
Qty: 30 CAPSULE | Refills: 0 | Status: SHIPPED | OUTPATIENT
Start: 2025-08-14 | End: 2025-08-24

## 2025-08-18 ENCOUNTER — ANESTHESIA EVENT (OUTPATIENT)
Dept: OPERATING ROOM | Age: 65
End: 2025-08-18
Payer: COMMERCIAL

## 2025-08-19 ENCOUNTER — ANESTHESIA (OUTPATIENT)
Dept: OPERATING ROOM | Age: 65
End: 2025-08-19
Payer: COMMERCIAL

## 2025-08-19 ENCOUNTER — HOSPITAL ENCOUNTER (OUTPATIENT)
Age: 65
Setting detail: OUTPATIENT SURGERY
Discharge: HOME | End: 2025-08-19
Attending: SURGERY | Admitting: SURGERY
Payer: COMMERCIAL

## 2025-08-19 VITALS
TEMPERATURE: 97.7 F | OXYGEN SATURATION: 97 % | SYSTOLIC BLOOD PRESSURE: 137 MMHG | HEART RATE: 62 BPM | DIASTOLIC BLOOD PRESSURE: 79 MMHG | RESPIRATION RATE: 17 BRPM

## 2025-08-19 DIAGNOSIS — G89.18 ACUTE POSTOPERATIVE PAIN: Primary | ICD-10-CM

## 2025-08-19 DIAGNOSIS — L72.3 SEBACEOUS CYST: ICD-10-CM

## 2025-08-19 LAB
BUN BLD-MCNC: 17 MG/DL (ref 8–26)
EGFR, POC: >90 ML/MIN/1.73M2
GLUCOSE BLD-MCNC: 164 MG/DL (ref 74–100)
GLUCOSE BLD-MCNC: 185 MG/DL (ref 75–110)
POC CREATININE: 0.9 MG/DL (ref 0.51–1.19)

## 2025-08-19 PROCEDURE — 6360000002 HC RX W HCPCS: Performed by: SURGERY

## 2025-08-19 PROCEDURE — 3600000013 HC SURGERY LEVEL 3 ADDTL 15MIN: Performed by: SURGERY

## 2025-08-19 PROCEDURE — 87070 CULTURE OTHR SPECIMN AEROBIC: CPT

## 2025-08-19 PROCEDURE — 2709999900 HC NON-CHARGEABLE SUPPLY: Performed by: SURGERY

## 2025-08-19 PROCEDURE — 82947 ASSAY GLUCOSE BLOOD QUANT: CPT

## 2025-08-19 PROCEDURE — 6360000002 HC RX W HCPCS: Performed by: NURSE ANESTHETIST, CERTIFIED REGISTERED

## 2025-08-19 PROCEDURE — 3600000003 HC SURGERY LEVEL 3 BASE: Performed by: SURGERY

## 2025-08-19 PROCEDURE — 7100000010 HC PHASE II RECOVERY - FIRST 15 MIN: Performed by: SURGERY

## 2025-08-19 PROCEDURE — 10060 I&D ABSCESS SIMPLE/SINGLE: CPT | Performed by: SURGERY

## 2025-08-19 PROCEDURE — 82565 ASSAY OF CREATININE: CPT

## 2025-08-19 PROCEDURE — 87205 SMEAR GRAM STAIN: CPT

## 2025-08-19 PROCEDURE — 87077 CULTURE AEROBIC IDENTIFY: CPT

## 2025-08-19 PROCEDURE — 3700000001 HC ADD 15 MINUTES (ANESTHESIA): Performed by: SURGERY

## 2025-08-19 PROCEDURE — 3700000000 HC ANESTHESIA ATTENDED CARE: Performed by: SURGERY

## 2025-08-19 PROCEDURE — 84520 ASSAY OF UREA NITROGEN: CPT

## 2025-08-19 PROCEDURE — 2580000003 HC RX 258: Performed by: NURSE ANESTHETIST, CERTIFIED REGISTERED

## 2025-08-19 PROCEDURE — 87076 CULTURE ANAEROBE IDENT EACH: CPT

## 2025-08-19 PROCEDURE — 2500000003 HC RX 250 WO HCPCS: Performed by: NURSE ANESTHETIST, CERTIFIED REGISTERED

## 2025-08-19 PROCEDURE — 7100000000 HC PACU RECOVERY - FIRST 15 MIN: Performed by: SURGERY

## 2025-08-19 PROCEDURE — 7100000001 HC PACU RECOVERY - ADDTL 15 MIN: Performed by: SURGERY

## 2025-08-19 PROCEDURE — 2720000010 HC SURG SUPPLY STERILE: Performed by: SURGERY

## 2025-08-19 PROCEDURE — 87075 CULTR BACTERIA EXCEPT BLOOD: CPT

## 2025-08-19 PROCEDURE — 93005 ELECTROCARDIOGRAM TRACING: CPT | Performed by: ANESTHESIOLOGY

## 2025-08-19 PROCEDURE — 2500000003 HC RX 250 WO HCPCS: Performed by: SURGERY

## 2025-08-19 RX ORDER — FENTANYL CITRATE 50 UG/ML
INJECTION, SOLUTION INTRAMUSCULAR; INTRAVENOUS
Status: DISCONTINUED | OUTPATIENT
Start: 2025-08-19 | End: 2025-08-19 | Stop reason: SDUPTHER

## 2025-08-19 RX ORDER — HYDROCODONE BITARTRATE AND ACETAMINOPHEN 5; 325 MG/1; MG/1
1 TABLET ORAL EVERY 6 HOURS PRN
Qty: 9 TABLET | Refills: 0 | Status: SHIPPED | OUTPATIENT
Start: 2025-08-19 | End: 2025-08-22

## 2025-08-19 RX ORDER — PROPOFOL 10 MG/ML
INJECTION, EMULSION INTRAVENOUS
Status: DISCONTINUED | OUTPATIENT
Start: 2025-08-19 | End: 2025-08-19 | Stop reason: SDUPTHER

## 2025-08-19 RX ORDER — SODIUM CHLORIDE 9 MG/ML
INJECTION, SOLUTION INTRAVENOUS PRN
Status: DISCONTINUED | OUTPATIENT
Start: 2025-08-19 | End: 2025-08-19 | Stop reason: HOSPADM

## 2025-08-19 RX ORDER — LIDOCAINE HYDROCHLORIDE 10 MG/ML
INJECTION, SOLUTION EPIDURAL; INFILTRATION; INTRACAUDAL; PERINEURAL
Status: DISCONTINUED | OUTPATIENT
Start: 2025-08-19 | End: 2025-08-19 | Stop reason: SDUPTHER

## 2025-08-19 RX ORDER — ROCURONIUM BROMIDE 10 MG/ML
INJECTION, SOLUTION INTRAVENOUS
Status: DISCONTINUED | OUTPATIENT
Start: 2025-08-19 | End: 2025-08-19 | Stop reason: SDUPTHER

## 2025-08-19 RX ORDER — BUPIVACAINE HYDROCHLORIDE 5 MG/ML
INJECTION, SOLUTION PERINEURAL PRN
Status: DISCONTINUED | OUTPATIENT
Start: 2025-08-19 | End: 2025-08-19 | Stop reason: HOSPADM

## 2025-08-19 RX ORDER — SODIUM CHLORIDE 0.9 % (FLUSH) 0.9 %
5-40 SYRINGE (ML) INJECTION PRN
Status: DISCONTINUED | OUTPATIENT
Start: 2025-08-19 | End: 2025-08-19 | Stop reason: HOSPADM

## 2025-08-19 RX ORDER — ONDANSETRON 2 MG/ML
INJECTION INTRAMUSCULAR; INTRAVENOUS
Status: DISCONTINUED | OUTPATIENT
Start: 2025-08-19 | End: 2025-08-19 | Stop reason: SDUPTHER

## 2025-08-19 RX ORDER — ONDANSETRON 4 MG/1
4 TABLET, ORALLY DISINTEGRATING ORAL 3 TIMES DAILY PRN
Qty: 21 TABLET | Refills: 0 | Status: SHIPPED | OUTPATIENT
Start: 2025-08-19

## 2025-08-19 RX ORDER — MAGNESIUM HYDROXIDE 1200 MG/15ML
LIQUID ORAL CONTINUOUS PRN
Status: DISCONTINUED | OUTPATIENT
Start: 2025-08-19 | End: 2025-08-19 | Stop reason: HOSPADM

## 2025-08-19 RX ORDER — SODIUM CHLORIDE, SODIUM LACTATE, POTASSIUM CHLORIDE, CALCIUM CHLORIDE 600; 310; 30; 20 MG/100ML; MG/100ML; MG/100ML; MG/100ML
INJECTION, SOLUTION INTRAVENOUS
Status: DISCONTINUED | OUTPATIENT
Start: 2025-08-19 | End: 2025-08-19 | Stop reason: SDUPTHER

## 2025-08-19 RX ORDER — SODIUM CHLORIDE 0.9 % (FLUSH) 0.9 %
5-40 SYRINGE (ML) INJECTION EVERY 12 HOURS SCHEDULED
Status: DISCONTINUED | OUTPATIENT
Start: 2025-08-19 | End: 2025-08-19 | Stop reason: HOSPADM

## 2025-08-19 RX ORDER — MIDAZOLAM HYDROCHLORIDE 1 MG/ML
INJECTION, SOLUTION INTRAMUSCULAR; INTRAVENOUS
Status: DISCONTINUED | OUTPATIENT
Start: 2025-08-19 | End: 2025-08-19 | Stop reason: SDUPTHER

## 2025-08-19 RX ORDER — PROCHLORPERAZINE EDISYLATE 5 MG/ML
5 INJECTION INTRAMUSCULAR; INTRAVENOUS
Status: DISCONTINUED | OUTPATIENT
Start: 2025-08-19 | End: 2025-08-19 | Stop reason: HOSPADM

## 2025-08-19 RX ADMIN — SODIUM CHLORIDE, POTASSIUM CHLORIDE, SODIUM LACTATE AND CALCIUM CHLORIDE: 600; 310; 30; 20 INJECTION, SOLUTION INTRAVENOUS at 07:10

## 2025-08-19 RX ADMIN — FENTANYL CITRATE 100 MCG: 50 INJECTION, SOLUTION INTRAMUSCULAR; INTRAVENOUS at 07:16

## 2025-08-19 RX ADMIN — SUGAMMADEX 200 MG: 100 INJECTION, SOLUTION INTRAVENOUS at 08:05

## 2025-08-19 RX ADMIN — Medication 2000 MG: at 07:43

## 2025-08-19 RX ADMIN — PROPOFOL 200 MG: 10 INJECTION, EMULSION INTRAVENOUS at 07:16

## 2025-08-19 RX ADMIN — LIDOCAINE HYDROCHLORIDE 50 MG: 10 INJECTION, SOLUTION EPIDURAL; INFILTRATION; INTRACAUDAL; PERINEURAL at 07:16

## 2025-08-19 RX ADMIN — ONDANSETRON 4 MG: 2 INJECTION, SOLUTION INTRAMUSCULAR; INTRAVENOUS at 07:59

## 2025-08-19 RX ADMIN — MIDAZOLAM 2 MG: 1 INJECTION INTRAMUSCULAR; INTRAVENOUS at 07:11

## 2025-08-19 RX ADMIN — ROCURONIUM BROMIDE 50 MG: 10 INJECTION, SOLUTION INTRAVENOUS at 07:16

## 2025-08-19 ASSESSMENT — PAIN DESCRIPTION - LOCATION: LOCATION: SHOULDER

## 2025-08-19 ASSESSMENT — PAIN SCALES - GENERAL
PAINLEVEL_OUTOF10: 0
PAINLEVEL_OUTOF10: 2
PAINLEVEL_OUTOF10: 0
PAINLEVEL_OUTOF10: 0

## 2025-08-20 LAB
EKG ATRIAL RATE: 66 BPM
EKG P AXIS: 19 DEGREES
EKG P-R INTERVAL: 158 MS
EKG Q-T INTERVAL: 400 MS
EKG QRS DURATION: 100 MS
EKG QTC CALCULATION (BAZETT): 419 MS
EKG R AXIS: -16 DEGREES
EKG T AXIS: -13 DEGREES
EKG VENTRICULAR RATE: 66 BPM

## 2025-08-20 PROCEDURE — 93010 ELECTROCARDIOGRAM REPORT: CPT | Performed by: INTERNAL MEDICINE

## 2025-08-24 PROBLEM — L72.3 SEBACEOUS CYST: Status: ACTIVE | Noted: 2025-08-24

## 2025-08-24 LAB
MICROORGANISM SPEC CULT: ABNORMAL
MICROORGANISM SPEC CULT: ABNORMAL
MICROORGANISM/AGENT SPEC: ABNORMAL
SERVICE CMNT-IMP: ABNORMAL
SPECIMEN DESCRIPTION: ABNORMAL

## 2025-08-28 ENCOUNTER — OFFICE VISIT (OUTPATIENT)
Dept: BARIATRICS/WEIGHT MGMT | Age: 65
End: 2025-08-28

## 2025-08-28 VITALS
OXYGEN SATURATION: 99 % | WEIGHT: 190 LBS | BODY MASS INDEX: 28.79 KG/M2 | HEART RATE: 61 BPM | SYSTOLIC BLOOD PRESSURE: 100 MMHG | HEIGHT: 68 IN | RESPIRATION RATE: 16 BRPM | DIASTOLIC BLOOD PRESSURE: 62 MMHG

## 2025-08-28 DIAGNOSIS — Z98.890 S/P PILONIDAL CYST EXCISION: Primary | ICD-10-CM

## (undated) DEVICE — SOLUTION PREP POVIDONE IOD FOR SKIN MUCOUS MEM PRIOR TO

## (undated) DEVICE — SCRUB DYNAHEX  4% CHG  8 OZ BOTTLE  24 EA

## (undated) DEVICE — SPONGE GZ W6XL6.75IN COT 6 PLY SUP FLUF EXTRA ABSRB FOR PRE

## (undated) DEVICE — 1000 S-DRAPE TOWEL DRAPE 10/BX: Brand: STERI-DRAPE™

## (undated) DEVICE — ELECTRODE PT RET AD L9FT HI MOIST COND ADH HYDRGEL CORDED

## (undated) DEVICE — Z DISCONTINUED APPLICATOR SURG PREP 0.35OZ 2% CHG 70% ISO ALC W/ HI LT

## (undated) DEVICE — GLOVE ORANGE PI 8   MSG9080

## (undated) DEVICE — CODMAN® SURGICAL PATTIES 1/2" X 3" (1.27CM X 7.62CM): Brand: CODMAN®

## (undated) DEVICE — AGENT HEMOSTATIC SURGIFLOW MATRIX KIT W/THROMBIN

## (undated) DEVICE — TRAY NRV BLK SUPP CUST

## (undated) DEVICE — Device

## (undated) DEVICE — NEEDLE SPNL 22GA L3.5IN BLK HUB S STL REG WALL FIT STYL W/

## (undated) DEVICE — ZINACTIVE USE 2537982 PAD GRND FOR RF PAIN MGMT DISP

## (undated) DEVICE — STRIP WND PK W0.5INXL5YD IODO GZ TIGHTLY WVN CURAD

## (undated) DEVICE — TOWEL,OR,DSP,ST,NATURAL,DLX,4/PK,20PK/CS: Brand: MEDLINE

## (undated) DEVICE — GLOVE ORANGE PI 7   MSG9070

## (undated) DEVICE — NEEDLE HYPO 25GA L1.5IN BLU POLYPR HUB S STL REG BVL STR

## (undated) DEVICE — GOWN,AURORA,NONREINFORCED,LARGE: Brand: MEDLINE

## (undated) DEVICE — SUTURE VCRL SZ 0 L18IN ABSRB UD L36MM CT-1 1/2 CIR J840D

## (undated) DEVICE — SYRINGE MED 10ML TRNSLUC BRL PLUNG BLK MRK POLYPR CTRL

## (undated) DEVICE — COVER LT HNDL BLU PLAS

## (undated) DEVICE — GARMENT,MEDLINE,DVT,INT,CALF,MED, GEN2: Brand: MEDLINE

## (undated) DEVICE — DRESSING BORDERED ADH GZ UNIV GEN USE 5IN 4IN AND 2 1/2IN

## (undated) DEVICE — SHEET,DRAPE,40X58,STERILE: Brand: MEDLINE

## (undated) DEVICE — YANKAUER,FLEXIBLE HANDLE,REGLR CAPACITY: Brand: MEDLINE INDUSTRIES, INC.

## (undated) DEVICE — C-ARMOR C-ARM EQUIPMENT COVERS CLEAR STERILE UNIVERSAL FIT 12 PER CASE: Brand: C-ARMOR

## (undated) DEVICE — DRAPE,REIN 53X77,STERILE: Brand: MEDLINE

## (undated) DEVICE — SHEET, T, LAPAROTOMY, STERILE: Brand: MEDLINE

## (undated) DEVICE — SPONGE LAP W18XL18IN WHT COT 4 PLY FLD STRUNG RADPQ DISP ST

## (undated) DEVICE — BLADE ES ELASTOMERIC COAT INSUL DURABLE BEND UPTO 90DEG

## (undated) DEVICE — CONNECTOR TBNG WHT PLAS SUCT STR 5IN1 LTWT W/ M CONN

## (undated) DEVICE — E-Z CLEAN, NON-STICK, PTFE COATED, ELECTROSURGICAL BLADE ELECTRODE, MODIFIED EXTENDED INSULATION, 2.5 INCH (6.35 CM): Brand: MEGADYNE

## (undated) DEVICE — ZINACTIVE USE 2539609 APPLICATOR MEDICATED 10.5 CC SOLUTION HI LT ORNG CHLORAPREP

## (undated) DEVICE — CHLORAPREP 26ML ORANGE

## (undated) DEVICE — TOOL 14MH30 LEGEND 14CM 3MM: Brand: MIDAS REX ™

## (undated) DEVICE — BLADE CLIPPER GEN PURP NS

## (undated) DEVICE — CODMAN® SURGICAL PATTIES 1/2" X 1/2" (1.27CM X 1.27CM): Brand: CODMAN®

## (undated) DEVICE — CONTAINER,SPECIMEN,4OZ,OR STRL: Brand: MEDLINE

## (undated) DEVICE — PATIENT RETURN ELECTRODE, SINGLE-USE, CONTACT QUALITY MONITORING, ADULT, WITH 9FT CORD, FOR PATIENTS WEIGING OVER 33LBS. (15KG): Brand: MEGADYNE

## (undated) DEVICE — 3M™ IOBAN™ 2 ANTIMICROBIAL INCISE DRAPE 6650EZ: Brand: IOBAN™ 2

## (undated) DEVICE — SYRINGE MED 10ML LUERLOCK TIP W/O SFTY DISP

## (undated) DEVICE — PACK PROCEDURE SURG LUMBAR SPINE SVMMC

## (undated) DEVICE — MARKER,SKIN,WI/RULER AND LABELS: Brand: MEDLINE

## (undated) DEVICE — DRAPE MICSCP W117XL305CM DIA65MM LENS W VARI LENS2 FOR LEICA

## (undated) DEVICE — KIT DRN FLAT W/ 100CC EVAC 7MM FULL PERF

## (undated) DEVICE — NEEDLE FLTR 18GA L1.5IN MEM THK5UM BLNT DISP

## (undated) DEVICE — SUTURE NONABSORBABLE MONOFILAMENT 3-0 PS-1 18 IN BLK ETHILON 1663H

## (undated) DEVICE — Device: Brand: SNAP ON SPHERZ

## (undated) DEVICE — APPLICATOR MEDICATED 26 CC SOLUTION HI LT ORNG CHLORAPREP

## (undated) DEVICE — GAUZE,SPONGE,FLUFF,6"X6.75",STRL,5/TRAY: Brand: MEDLINE

## (undated) DEVICE — DRAPE C ARM UNIV W41XL74IN CLR PLAS XR VELC CLSR POLY STRP

## (undated) DEVICE — THE MILL DISPOSABLE - MEDIUM

## (undated) DEVICE — GLOVE ORANGE PI 7 1/2   MSG9075

## (undated) DEVICE — BANDAGE ADH W0.75XL3IN NAT PLAS CURAD

## (undated) DEVICE — GLOVE ORANGE PI 8 1/2   MSG9085

## (undated) DEVICE — SURGICAL SCRUB TRAY PREM DRY SKIN VLY LF

## (undated) DEVICE — AGENT HEMSTAT 3GM OXIDIZED REGENERATED CELOS ABSRB FOR CONT (ORDER MULTIPLES OF 5EA)

## (undated) DEVICE — SPONGE,NEURO,0.5"X3",XR,STRL,LF,10/PK: Brand: MEDLINE

## (undated) DEVICE — GLOVE SURG SZ 65 THK91MIL LTX FREE SYN POLYISOPRENE

## (undated) DEVICE — NEEDLE SPNL 18GA L3.5IN W/ QNCKE SHARPER BVL DURA CLICK

## (undated) DEVICE — COVER,MAYO STAND,STERILE: Brand: MEDLINE

## (undated) DEVICE — INTENDED FOR TISSUE SEPARATION, AND OTHER PROCEDURES THAT REQUIRE A SHARP SURGICAL BLADE TO PUNCTURE OR CUT.: Brand: BARD-PARKER ® CARBON RIB-BACK BLADES

## (undated) DEVICE — PROTECTOR ULN NRV PUR FOAM HK LOOP STRP ANATOMICALLY

## (undated) DEVICE — GOWN,SIRUS,NONRNF,SETINSLV,XL,20/CS: Brand: MEDLINE

## (undated) DEVICE — SYRINGE IRRIG 60ML SFT PLIABLE BLB EZ TO GRP 1 HND USE W/

## (undated) DEVICE — TOTAL TRAY, 16FR 10ML SIL FOLEY, URN: Brand: MEDLINE

## (undated) DEVICE — SUTURE VCRL SZ 2-0 L18IN ABSRB VLT CT-1 L36MM 1/2 CIR J739D

## (undated) DEVICE — C-ARM: Brand: UNBRANDED

## (undated) DEVICE — SPONGE,NEURO,0.5"X0.5",XR,STRL,10/PK: Brand: MEDLINE

## (undated) DEVICE — GLOVE SURG SZ 8 L12IN FNGR THK79MIL GRN LTX FREE

## (undated) DEVICE — STERILE POLYISOPRENE POWDER-FREE SURGICAL GLOVES WITH EMOLLIENT COATING: Brand: PROTEXIS

## (undated) DEVICE — BIPOLAR SEALER 23-113-1 AQM 2.3 OM NEURO: Brand: AQUAMANTYS ®

## (undated) DEVICE — SUTURE VCRL SZ 2-0 L18IN ABSRB UD CT-1 L36MM 1/2 CIR J839D